# Patient Record
Sex: MALE | Race: WHITE | NOT HISPANIC OR LATINO | Employment: OTHER | URBAN - METROPOLITAN AREA
[De-identification: names, ages, dates, MRNs, and addresses within clinical notes are randomized per-mention and may not be internally consistent; named-entity substitution may affect disease eponyms.]

---

## 2018-09-06 PROBLEM — R03.0 ELEVATED BLOOD PRESSURE READING: Status: ACTIVE | Noted: 2018-09-06

## 2018-09-06 PROBLEM — E78.5 HYPERLIPIDEMIA LDL GOAL <100: Status: ACTIVE | Noted: 2018-09-06

## 2018-09-06 PROBLEM — R35.1 NOCTURIA: Status: ACTIVE | Noted: 2018-09-06

## 2018-09-06 PROBLEM — Z00.00 WELLNESS EXAMINATION: Status: ACTIVE | Noted: 2018-09-06

## 2018-09-06 PROBLEM — R77.9 ELEVATED BLOOD PROTEIN: Status: ACTIVE | Noted: 2018-09-06

## 2018-09-06 PROBLEM — N40.0 BENIGN PROSTATIC HYPERPLASIA: Status: ACTIVE | Noted: 2018-09-06

## 2018-10-05 PROBLEM — I10 HTN, GOAL BELOW 140/90: Status: ACTIVE | Noted: 2018-10-05

## 2018-11-12 PROBLEM — R60.0 BILATERAL LEG EDEMA: Status: ACTIVE | Noted: 2018-11-12

## 2018-11-20 ENCOUNTER — HOSPITAL ENCOUNTER (OUTPATIENT)
Dept: CARDIOLOGY | Facility: HOSPITAL | Age: 78
Discharge: HOME | End: 2018-11-20
Attending: INTERNAL MEDICINE
Payer: MEDICARE

## 2018-11-20 DIAGNOSIS — R60.0 BILATERAL LEG EDEMA: ICD-10-CM

## 2018-11-20 LAB
ASCENDING AORTA: 2.84 CM
AV MEAN GRADIENT: 2.96 MMHG
AV PEAK GRADIENT: 5.02 MMHG
AV VALVE AREA: 4.38 CM2
DOP CALC AO PEAK VEL: 1.12 M/S
DOP CALC AO VTI: 25.37 CM
DOP CALC LVOT AREA: 4.75 CM2
DOP CALC LVOT DIAMETER: 2.46 CM
DOP CALC LVOT STROKE VOLUME: 111.16 CM3
DOP CALCLVOT PEAK VEL VTI: 23.4 CM
E WAVE DECELERATION TIME: 214.44 MSEC
E/A RATIO: 0.9
IVRT: 0.06 MSEC
LA MAJOR: 5.12 CM
LA MINOR: 5.53 CM
LA WIDTH: 3.16 CM
MV PEAK A VEL: 0.71 M/S
MV PEAK E VEL: 0.64 M/S
PISA TR MAX VEL: 1.84 M/S
RA MAJOR: 4.98 CM
RA WIDTH: 3.37 CM
RIGHT VENTRICULAR END-DIASTOLIC DIMENSION: 3.18 CM
RV TISSUE DOPPLER FREE WALL SYSTOLIC VELOCITY 1 (APICAL 4 CHAMBER VIEW): 12.32 M/S
SINUS: 3.91 CM
STJ: 2.74 CM
TR MAX PG: 13.54 MMHG
TRICUSPID ANNULAR PLANE SYSTOLIC EXCURSION: 1.95 CM

## 2018-11-20 PROCEDURE — 93306 TTE W/DOPPLER COMPLETE: CPT

## 2018-11-20 PROCEDURE — 93306 TTE W/DOPPLER COMPLETE: CPT | Mod: 26,,, | Performed by: INTERNAL MEDICINE

## 2018-12-10 PROBLEM — Z00.00 WELLNESS EXAMINATION: Status: RESOLVED | Noted: 2018-09-06 | Resolved: 2018-12-10

## 2019-01-04 ENCOUNTER — LAB VISIT (OUTPATIENT)
Dept: LAB | Facility: HOSPITAL | Age: 79
End: 2019-01-04
Attending: INTERNAL MEDICINE
Payer: MEDICARE

## 2019-01-04 DIAGNOSIS — R10.9 ABDOMINAL PAIN, UNSPECIFIED ABDOMINAL LOCATION: ICD-10-CM

## 2019-01-04 DIAGNOSIS — I89.0 LYMPHEDEMA: ICD-10-CM

## 2019-01-04 DIAGNOSIS — R60.0 BILATERAL LEG EDEMA: ICD-10-CM

## 2019-01-04 DIAGNOSIS — N40.1 ENLARGED PROSTATE WITH URINARY OBSTRUCTION: ICD-10-CM

## 2019-01-04 DIAGNOSIS — N13.8 ENLARGED PROSTATE WITH URINARY OBSTRUCTION: ICD-10-CM

## 2019-01-04 PROBLEM — N50.89 SCROTAL SWELLING: Status: ACTIVE | Noted: 2019-01-04

## 2019-01-04 LAB
ALBUMIN SERPL BCP-MCNC: 3.9 G/DL
ALP SERPL-CCNC: 80 U/L
ALT SERPL W/O P-5'-P-CCNC: 10 U/L
ANION GAP SERPL CALC-SCNC: 11 MMOL/L
AST SERPL-CCNC: 16 U/L
BASOPHILS # BLD AUTO: 0.02 K/UL
BASOPHILS NFR BLD: 0.3 %
BILIRUB SERPL-MCNC: 0.9 MG/DL
BUN SERPL-MCNC: 20 MG/DL
CALCIUM SERPL-MCNC: 9.6 MG/DL
CHLORIDE SERPL-SCNC: 100 MMOL/L
CO2 SERPL-SCNC: 26 MMOL/L
CREAT SERPL-MCNC: 2.3 MG/DL
CREAT SERPL-MCNC: 2.3 MG/DL
DIFFERENTIAL METHOD: ABNORMAL
EOSINOPHIL # BLD AUTO: 0.1 K/UL
EOSINOPHIL NFR BLD: 1.8 %
ERYTHROCYTE [DISTWIDTH] IN BLOOD BY AUTOMATED COUNT: 14.5 %
EST. GFR  (AFRICAN AMERICAN): 30 ML/MIN/1.73 M^2
EST. GFR  (AFRICAN AMERICAN): 30 ML/MIN/1.73 M^2
EST. GFR  (NON AFRICAN AMERICAN): 26 ML/MIN/1.73 M^2
EST. GFR  (NON AFRICAN AMERICAN): 26 ML/MIN/1.73 M^2
GLUCOSE SERPL-MCNC: 121 MG/DL
HCT VFR BLD AUTO: 40.6 %
HGB BLD-MCNC: 13.7 G/DL
LDH SERPL L TO P-CCNC: 268 U/L
LYMPHOCYTES # BLD AUTO: 0.8 K/UL
LYMPHOCYTES NFR BLD: 12.3 %
MCH RBC QN AUTO: 29.7 PG
MCHC RBC AUTO-ENTMCNC: 33.7 G/DL
MCV RBC AUTO: 88 FL
MONOCYTES # BLD AUTO: 0.5 K/UL
MONOCYTES NFR BLD: 7.1 %
NEUTROPHILS # BLD AUTO: 5.1 K/UL
NEUTROPHILS NFR BLD: 78.5 %
PLATELET # BLD AUTO: 283 K/UL
PMV BLD AUTO: 9.4 FL
POTASSIUM SERPL-SCNC: 4.3 MMOL/L
PROT SERPL-MCNC: 7.6 G/DL
RBC # BLD AUTO: 4.61 M/UL
SODIUM SERPL-SCNC: 137 MMOL/L
URATE SERPL-MCNC: 8.6 MG/DL
WBC # BLD AUTO: 6.51 K/UL

## 2019-01-04 PROCEDURE — 80053 COMPREHEN METABOLIC PANEL: CPT

## 2019-01-04 PROCEDURE — 84154 ASSAY OF PSA FREE: CPT

## 2019-01-04 PROCEDURE — 36415 COLL VENOUS BLD VENIPUNCTURE: CPT

## 2019-01-04 PROCEDURE — 84550 ASSAY OF BLOOD/URIC ACID: CPT

## 2019-01-04 PROCEDURE — 85025 COMPLETE CBC W/AUTO DIFF WBC: CPT

## 2019-01-04 PROCEDURE — 83615 LACTATE (LD) (LDH) ENZYME: CPT

## 2019-01-07 LAB
PROSTATE SPECIFIC ANTIGEN, TOTAL: 97.2 NG/ML
PSA FREE MFR SERPL: ABNORMAL %
PSA FREE SERPL-MCNC: >20 NG/ML

## 2019-01-10 ENCOUNTER — HOSPITAL ENCOUNTER (OUTPATIENT)
Dept: RADIOLOGY | Facility: HOSPITAL | Age: 79
Discharge: HOME | DRG: 660 | End: 2019-01-10
Attending: INTERNAL MEDICINE
Payer: MEDICARE

## 2019-01-10 ENCOUNTER — HOSPITAL ENCOUNTER (INPATIENT)
Facility: HOSPITAL | Age: 79
LOS: 5 days | Discharge: HOME | DRG: 660 | End: 2019-01-15
Attending: EMERGENCY MEDICINE | Admitting: INTERNAL MEDICINE
Payer: MEDICARE

## 2019-01-10 DIAGNOSIS — R10.9 ABDOMINAL PAIN, UNSPECIFIED ABDOMINAL LOCATION: ICD-10-CM

## 2019-01-10 DIAGNOSIS — R35.1 NOCTURIA: ICD-10-CM

## 2019-01-10 DIAGNOSIS — R33.9 URINARY RETENTION: ICD-10-CM

## 2019-01-10 DIAGNOSIS — N40.1 ENLARGED PROSTATE WITH URINARY OBSTRUCTION: ICD-10-CM

## 2019-01-10 DIAGNOSIS — N50.89 SCROTAL SWELLING: ICD-10-CM

## 2019-01-10 DIAGNOSIS — R59.0 RETROPERITONEAL LYMPHADENOPATHY: ICD-10-CM

## 2019-01-10 DIAGNOSIS — N17.9 AKI (ACUTE KIDNEY INJURY): Primary | ICD-10-CM

## 2019-01-10 DIAGNOSIS — N13.8 ENLARGED PROSTATE WITH URINARY OBSTRUCTION: ICD-10-CM

## 2019-01-10 DIAGNOSIS — N13.30 BILATERAL HYDRONEPHROSIS: ICD-10-CM

## 2019-01-10 DIAGNOSIS — R60.0 LOWER EXTREMITY EDEMA: ICD-10-CM

## 2019-01-10 PROBLEM — R97.20 ELEVATED PSA: Status: ACTIVE | Noted: 2019-01-10

## 2019-01-10 LAB
ALBUMIN SERPL BCP-MCNC: 3.6 G/DL
ALP SERPL-CCNC: 83 U/L
ALT SERPL W/O P-5'-P-CCNC: 8 U/L
ANION GAP SERPL CALC-SCNC: 12 MMOL/L
AST SERPL-CCNC: 17 U/L
BACTERIA #/AREA URNS HPF: NORMAL /HPF
BASOPHILS # BLD AUTO: 0.01 K/UL
BASOPHILS NFR BLD: 0.2 %
BILIRUB SERPL-MCNC: 0.6 MG/DL
BILIRUB UR QL STRIP: NEGATIVE
BNP SERPL-MCNC: 38 PG/ML
BUN SERPL-MCNC: 17 MG/DL
CALCIUM SERPL-MCNC: 9.7 MG/DL
CHLORIDE SERPL-SCNC: 102 MMOL/L
CLARITY UR: CLEAR
CO2 SERPL-SCNC: 22 MMOL/L
COLOR UR: YELLOW
COMPLEXED PSA SERPL-MCNC: 95.8 NG/ML
CREAT SERPL-MCNC: 2.3 MG/DL
DIFFERENTIAL METHOD: ABNORMAL
EOSINOPHIL # BLD AUTO: 0.1 K/UL
EOSINOPHIL NFR BLD: 0.9 %
ERYTHROCYTE [DISTWIDTH] IN BLOOD BY AUTOMATED COUNT: 14.5 %
EST. GFR  (AFRICAN AMERICAN): 30 ML/MIN/1.73 M^2
EST. GFR  (NON AFRICAN AMERICAN): 26 ML/MIN/1.73 M^2
GLUCOSE SERPL-MCNC: 84 MG/DL
GLUCOSE UR QL STRIP: NEGATIVE
HCT VFR BLD AUTO: 41.1 %
HGB BLD-MCNC: 14 G/DL
HGB UR QL STRIP: ABNORMAL
INR PPP: 1
KETONES UR QL STRIP: NEGATIVE
LEUKOCYTE ESTERASE UR QL STRIP: ABNORMAL
LYMPHOCYTES # BLD AUTO: 0.7 K/UL
LYMPHOCYTES NFR BLD: 11.2 %
MCH RBC QN AUTO: 29.9 PG
MCHC RBC AUTO-ENTMCNC: 34.1 G/DL
MCV RBC AUTO: 88 FL
MICROSCOPIC COMMENT: NORMAL
MONOCYTES # BLD AUTO: 0.5 K/UL
MONOCYTES NFR BLD: 8.3 %
NEUTROPHILS # BLD AUTO: 5.1 K/UL
NEUTROPHILS NFR BLD: 79.4 %
NITRITE UR QL STRIP: NEGATIVE
PH UR STRIP: 5 [PH] (ref 5–8)
PLATELET # BLD AUTO: 277 K/UL
PMV BLD AUTO: 9.6 FL
POTASSIUM SERPL-SCNC: 4.5 MMOL/L
PROT SERPL-MCNC: 7.5 G/DL
PROT UR QL STRIP: NEGATIVE
PROTHROMBIN TIME: 10 SEC
RBC # BLD AUTO: 4.69 M/UL
RBC #/AREA URNS HPF: 3 /HPF (ref 0–4)
SODIUM SERPL-SCNC: 136 MMOL/L
SP GR UR STRIP: 1.01 (ref 1–1.03)
SQUAMOUS #/AREA URNS HPF: 1 /HPF
URN SPEC COLLECT METH UR: ABNORMAL
UROBILINOGEN UR STRIP-ACNC: NEGATIVE EU/DL
WBC # BLD AUTO: 6.41 K/UL
WBC #/AREA URNS HPF: 4 /HPF (ref 0–5)

## 2019-01-10 PROCEDURE — 99223 1ST HOSP IP/OBS HIGH 75: CPT | Mod: ,,, | Performed by: UROLOGY

## 2019-01-10 PROCEDURE — 96360 HYDRATION IV INFUSION INIT: CPT

## 2019-01-10 PROCEDURE — 99223 PR INITIAL HOSPITAL CARE,LEVL III: ICD-10-PCS | Mod: ,,, | Performed by: UROLOGY

## 2019-01-10 PROCEDURE — 85610 PROTHROMBIN TIME: CPT

## 2019-01-10 PROCEDURE — 80053 COMPREHEN METABOLIC PANEL: CPT

## 2019-01-10 PROCEDURE — 74176 CT ABD & PELVIS W/O CONTRAST: CPT | Mod: 26,,, | Performed by: RADIOLOGY

## 2019-01-10 PROCEDURE — 84153 ASSAY OF PSA TOTAL: CPT

## 2019-01-10 PROCEDURE — 74176 CT ABD & PELVIS W/O CONTRAST: CPT | Mod: TC

## 2019-01-10 PROCEDURE — 51702 INSERT TEMP BLADDER CATH: CPT

## 2019-01-10 PROCEDURE — 25000003 PHARM REV CODE 250: Performed by: EMERGENCY MEDICINE

## 2019-01-10 PROCEDURE — 25500020 PHARM REV CODE 255: Performed by: INTERNAL MEDICINE

## 2019-01-10 PROCEDURE — 11000001 HC ACUTE MED/SURG PRIVATE ROOM

## 2019-01-10 PROCEDURE — 85025 COMPLETE CBC W/AUTO DIFF WBC: CPT

## 2019-01-10 PROCEDURE — 74176 CT ABDOMEN PELVIS WITHOUT CONTRAST: ICD-10-PCS | Mod: 26,,, | Performed by: RADIOLOGY

## 2019-01-10 PROCEDURE — 96361 HYDRATE IV INFUSION ADD-ON: CPT

## 2019-01-10 PROCEDURE — 99285 EMERGENCY DEPT VISIT HI MDM: CPT | Mod: 25

## 2019-01-10 PROCEDURE — 83880 ASSAY OF NATRIURETIC PEPTIDE: CPT

## 2019-01-10 PROCEDURE — 81000 URINALYSIS NONAUTO W/SCOPE: CPT

## 2019-01-10 PROCEDURE — 63600175 PHARM REV CODE 636 W HCPCS: Performed by: EMERGENCY MEDICINE

## 2019-01-10 RX ORDER — ACETAMINOPHEN 325 MG/1
650 TABLET ORAL EVERY 8 HOURS PRN
Status: DISCONTINUED | OUTPATIENT
Start: 2019-01-10 | End: 2019-01-15 | Stop reason: HOSPADM

## 2019-01-10 RX ORDER — HEPARIN SODIUM 5000 [USP'U]/ML
5000 INJECTION, SOLUTION INTRAVENOUS; SUBCUTANEOUS EVERY 8 HOURS
Status: COMPLETED | OUTPATIENT
Start: 2019-01-10 | End: 2019-01-13

## 2019-01-10 RX ORDER — TAMSULOSIN HYDROCHLORIDE 0.4 MG/1
0.4 CAPSULE ORAL DAILY
Status: DISCONTINUED | OUTPATIENT
Start: 2019-01-11 | End: 2019-01-15 | Stop reason: HOSPADM

## 2019-01-10 RX ORDER — ONDANSETRON 8 MG/1
8 TABLET, ORALLY DISINTEGRATING ORAL EVERY 8 HOURS PRN
Status: DISCONTINUED | OUTPATIENT
Start: 2019-01-10 | End: 2019-01-15 | Stop reason: HOSPADM

## 2019-01-10 RX ORDER — SODIUM CHLORIDE 9 MG/ML
1000 INJECTION, SOLUTION INTRAVENOUS CONTINUOUS
Status: ACTIVE | OUTPATIENT
Start: 2019-01-10 | End: 2019-01-11

## 2019-01-10 RX ORDER — SODIUM CHLORIDE 0.9 % (FLUSH) 0.9 %
5 SYRINGE (ML) INJECTION
Status: DISCONTINUED | OUTPATIENT
Start: 2019-01-10 | End: 2019-01-15 | Stop reason: HOSPADM

## 2019-01-10 RX ADMIN — HEPARIN SODIUM 5000 UNITS: 5000 INJECTION, SOLUTION INTRAVENOUS; SUBCUTANEOUS at 09:01

## 2019-01-10 RX ADMIN — IOHEXOL 15 ML: 300 INJECTION, SOLUTION INTRAVENOUS at 08:01

## 2019-01-10 RX ADMIN — SODIUM CHLORIDE 1000 ML: 0.9 INJECTION, SOLUTION INTRAVENOUS at 02:01

## 2019-01-10 NOTE — ASSESSMENT & PLAN NOTE
- Concern for obstruction from prostate and/or LAD   - Place arias (done in ER)   - Check JETT in 2 days to evaluate for residual hydronephrosis   - Discussed possible b/l ureteral stent placement if hydronephrosis does not resolve/worsens

## 2019-01-10 NOTE — CONSULTS
[facility]  Urology  Consult Note    Patient Name: Obed Sood  MRN: [id number]  Admission Date: 1/10/2019  Hospital Length of Stay: 0   Code Status: No Order   Attending Provider: Rina Comer MD   Consulting Provider: Myriam Spivey MD  Primary Care Physician: Enrique Barker MD  Principal Problem:<principal problem not specified>    Inpatient consult to Urology  Consult performed by: Myriam Spivey MD  Consult ordered by: Rina Comer MD          Subjective:     HPI:  79yo M with recent ARF and LE edema. PSA last week was 97.2. He has no known prostate cancer issues though does report in  he was recommended for PBx but declined at that time. He notes mild urinary issues. Denies hematuria.     PVR today 400cc. Escalante catheter placed in ER with return of 400cc yellow urine.     CT scan today shows nodular, enlarged prostate with extensive LAD. Mild bilateral hydroureteronephrosis with some tortuosity of ureters. Thickened bladder with moderate distention.    Past Medical History:   Diagnosis Date    MIAN (acute kidney injury) 09/10/2018    BPH (benign prostatic hyperplasia)     Edema 2018       Past Surgical History:   Procedure Laterality Date    HERNIA REPAIR N/A     umbilical    MULTIPLE TOOTH EXTRACTIONS      TONSILLECTOMY Bilateral 1969       Review of patient's allergies indicates:  No Known Allergies    Family History     Problem Relation (Age of Onset)    No Known Problems Mother, Father, Sister, Brother          Tobacco Use    Smoking status: Former Smoker     Packs/day: 0.50     Start date: 1953     Last attempt to quit: 1978     Years since quittin.3    Smokeless tobacco: Former User   Substance and Sexual Activity    Alcohol use: No     Frequency: Never    Drug use: No    Sexual activity: Yes     Partners: Female     Birth control/protection: None       Review of Systems   Constitutional: Negative for chills and fever.    HENT: Negative for hearing loss, sore throat and trouble swallowing.    Eyes: Negative.    Respiratory: Negative for cough and shortness of breath.    Cardiovascular: Negative for chest pain.   Gastrointestinal: Negative for abdominal distention, abdominal pain, constipation, diarrhea, nausea and vomiting.   Genitourinary: Positive for difficulty urinating. Negative for frequency and hematuria.   Musculoskeletal: Negative for arthralgias and neck pain.        Edema   Skin: Negative for color change, pallor and rash.   Neurological: Negative for dizziness and seizures.   Hematological: Negative for adenopathy.   Psychiatric/Behavioral: Negative for confusion.   All other systems reviewed and are negative.      Objective:     Temp:  [98.2 °F (36.8 °C)-98.6 °F (37 °C)] 98.2 °F (36.8 °C)  Pulse:  [63-85] 71  Resp:  [13-20] 20  SpO2:  [96 %-98 %] 98 %  BP: (112-172)/(66-75) 154/72     Body mass index is 26.43 kg/m².    Date 01/10/19 0700 - 01/11/19 0659   Shift 6462-8646 5398-3174 6943-3871 24 Hour Total   INTAKE   P.O. 8   8   Shift Total 8   8   OUTPUT   Urine 505   505   Shift Total 505   505   Weight (kg)         Bladder Scan Volume (mL): 402 mL (01/10/19 1226)  Post Void Cath Residual (mL): 380 mL (01/10/19 1125)    Drains     Drain                 Urethral Catheter 01/10/19 1255 16 Fr. less than 1 day                Physical Exam   Vitals reviewed.  Constitutional: He is oriented to person, place, and time. He appears well-developed and well-nourished. No distress.   HENT:   Head: Normocephalic and atraumatic.   Eyes: Right eye exhibits no discharge. Left eye exhibits no discharge. No scleral icterus.   Neck: Normal range of motion. Neck supple.   Cardiovascular: Normal rate and regular rhythm.    Pulmonary/Chest: Effort normal and breath sounds normal. No respiratory distress.   Abdominal: Soft. Bowel sounds are normal. He exhibits no distension. There is no tenderness. There is no rebound and no guarding.    Genitourinary:   Genitourinary Comments: Arias - clear yellow urine  MARY - rigid prostate, no distinct nodularity. Approx 60g.    Musculoskeletal: Normal range of motion.   Neurological: He is alert and oriented to person, place, and time.   Skin: Skin is warm and dry. He is not diaphoretic. No erythema.         Significant Labs:    BMP:  Recent Labs   Lab 01/04/19  1447 01/09/19  1742 01/10/19  1110    139 136   K 4.3 4.4 4.5    104 102   CO2 26 27 22*   BUN 20 16 17   CREATININE 2.3*  2.3* 2.2* 2.3*   CALCIUM 9.6 8.9 9.7       CBC:  Recent Labs   Lab 01/04/19  1447 01/10/19  1110   WBC 6.51 6.41   HGB 13.7* 14.0   HCT 40.6 41.1    277       Blood Culture: No results for input(s): LABBLOO in the last 168 hours.  Urine Culture: No results for input(s): LABURIN in the last 168 hours.  Urine Studies:   Recent Labs   Lab 01/10/19  1125   COLORU Yellow   APPEARANCEUA Clear   PHUR 5.0   SPECGRAV 1.010   PROTEINUA Negative   GLUCUA Negative   KETONESU Negative   BILIRUBINUA Negative   OCCULTUA 2+*   NITRITE Negative   UROBILINOGEN Negative   LEUKOCYTESUR Trace*   RBCUA 3   WBCUA 4   BACTERIA Rare   SQUAMEPITHEL 1       Significant Imaging:  All pertinent imaging results/findings from the past 24 hours have been reviewed.          Assessment and Plan:     Bilateral hydronephrosis     - Concern for obstruction from prostate and/or LAD   - Place arias (done in ER)   - Check JETT in 2 days to evaluate for residual hydronephrosis   - Discussed possible b/l ureteral stent placement if hydronephrosis does not resolve/worsens      Elevated PSA     - Highly concerning for prostate cancer   - Will need prostate biopsy (or lymph node biopsy)     Enlarged prostate with urinary obstruction     - Arias catheter   - Flomax         VTE Risk Mitigation (From admission, onward)    None          Thank you for your consult. I will follow-up with patient. Please contact us if you have any additional  questions.    Myriam Spivey MD  Urology  [facility]

## 2019-01-10 NOTE — SUBJECTIVE & OBJECTIVE
Past Medical History:   Diagnosis Date    MIAN (acute kidney injury) 09/10/2018    BPH (benign prostatic hyperplasia)     Edema 2018       Past Surgical History:   Procedure Laterality Date    HERNIA REPAIR N/A     umbilical    MULTIPLE TOOTH EXTRACTIONS      TONSILLECTOMY Bilateral 1969       Review of patient's allergies indicates:  No Known Allergies    Family History     Problem Relation (Age of Onset)    No Known Problems Mother, Father, Sister, Brother          Tobacco Use    Smoking status: Former Smoker     Packs/day: 0.50     Start date: 1953     Last attempt to quit: 1978     Years since quittin.3    Smokeless tobacco: Former User   Substance and Sexual Activity    Alcohol use: No     Frequency: Never    Drug use: No    Sexual activity: Yes     Partners: Female     Birth control/protection: None       Review of Systems   Constitutional: Negative for chills and fever.   HENT: Negative for hearing loss, sore throat and trouble swallowing.    Eyes: Negative.    Respiratory: Negative for cough and shortness of breath.    Cardiovascular: Negative for chest pain.   Gastrointestinal: Negative for abdominal distention, abdominal pain, constipation, diarrhea, nausea and vomiting.   Genitourinary: Positive for difficulty urinating. Negative for frequency and hematuria.   Musculoskeletal: Negative for arthralgias and neck pain.        Edema   Skin: Negative for color change, pallor and rash.   Neurological: Negative for dizziness and seizures.   Hematological: Negative for adenopathy.   Psychiatric/Behavioral: Negative for confusion.   All other systems reviewed and are negative.      Objective:     Temp:  [98.2 °F (36.8 °C)-98.6 °F (37 °C)] 98.2 °F (36.8 °C)  Pulse:  [63-85] 71  Resp:  [13-20] 20  SpO2:  [96 %-98 %] 98 %  BP: (112-172)/(66-75) 154/72     Body mass index is 26.43 kg/m².    Date 01/10/19 0700 - 19 0659   Shift 8478-5343 0255-1483 8118-5878 24 Hour Total   INTAKE    P.O. 8   8   Shift Total 8   8   OUTPUT   Urine 505   505   Shift Total 505   505   Weight (kg)         Bladder Scan Volume (mL): 402 mL (01/10/19 1226)  Post Void Cath Residual (mL): 380 mL (01/10/19 1125)    Drains     Drain                 Urethral Catheter 01/10/19 1255 16 Fr. less than 1 day                Physical Exam   Vitals reviewed.  Constitutional: He is oriented to person, place, and time. He appears well-developed and well-nourished. No distress.   HENT:   Head: Normocephalic and atraumatic.   Eyes: Right eye exhibits no discharge. Left eye exhibits no discharge. No scleral icterus.   Neck: Normal range of motion. Neck supple.   Cardiovascular: Normal rate and regular rhythm.    Pulmonary/Chest: Effort normal and breath sounds normal. No respiratory distress.   Abdominal: Soft. Bowel sounds are normal. He exhibits no distension. There is no tenderness. There is no rebound and no guarding.   Genitourinary:   Genitourinary Comments: Escalante - clear yellow urine  MARY - rigid prostate, no distinct nodularity. Approx 60g.    Musculoskeletal: Normal range of motion.   Neurological: He is alert and oriented to person, place, and time.   Skin: Skin is warm and dry. He is not diaphoretic. No erythema.         Significant Labs:    BMP:  Recent Labs   Lab 01/04/19  1447 01/09/19  1742 01/10/19  1110    139 136   K 4.3 4.4 4.5    104 102   CO2 26 27 22*   BUN 20 16 17   CREATININE 2.3*  2.3* 2.2* 2.3*   CALCIUM 9.6 8.9 9.7       CBC:  Recent Labs   Lab 01/04/19  1447 01/10/19  1110   WBC 6.51 6.41   HGB 13.7* 14.0   HCT 40.6 41.1    277       Blood Culture: No results for input(s): LABBLOO in the last 168 hours.  Urine Culture: No results for input(s): LABURIN in the last 168 hours.  Urine Studies:   Recent Labs   Lab 01/10/19  1125   COLORU Yellow   APPEARANCEUA Clear   PHUR 5.0   SPECGRAV 1.010   PROTEINUA Negative   GLUCUA Negative   KETONESU Negative   BILIRUBINUA Negative   OCCULTUA 2+*    NITRITE Negative   UROBILINOGEN Negative   LEUKOCYTESUR Trace*   RBCUA 3   WBCUA 4   BACTERIA Rare   SQUAMEPITHEL 1       Significant Imaging:  All pertinent imaging results/findings from the past 24 hours have been reviewed.

## 2019-01-10 NOTE — ED PROVIDER NOTES
"Encounter Date: 1/10/2019    SCRIBE #1 NOTE: I, Anai Feliciano, am scribing for, and in the presence of,  Rina Comer MD. I have scribed the following portions of the note - Other sections scribed: HPI, ROS.       History     Chief Complaint   Patient presents with    Urinary Obstruction     Send by Dr. Owen.  "I have urine backing up in my kidney."  PMx prostate cancer.     CC: Urinary Obstruction    HPI: This is a 79 y/o male with BPH who presents to the ED for emergent evaluation of urinary obstruction. Pt notes that he was sent by Dr. Barker for admission. Pt reports that he has been getting IV fluids for the past 3 days for his bilateral lower extremity edema. Pt reports that the edema occurred after getting back from a road trip to [place] with lots of walking. Pt was told that he had "urine backing up his kidneys." Pt notes that after Dr. Barker saw pt's CT scan, he told him to come to ED to be admitted. Pt reports that it is difficult to lift his legs but denies any other symptoms. Pt notes that he only takes Flomax, Dyazide, and Saw palmetto daily. Denies known allergies.     On discussion with Dr. Barker, he had a CT scan performed due to concern that he may have retroperitoneal adenopathy.  CT was performed showing retroperitoneal adenopathy, nodular prostate, and urinary obstruction with bilateral hydronephrosis.  He was sent to the emergency department for emergent urology evaluation and admission for a KI and urinary obstruction.        The history is provided by the patient. No  was used.     Review of patient's allergies indicates:  No Known Allergies  Past Medical History:   Diagnosis Date    MIAN (acute kidney injury) 09/10/2018    BPH (benign prostatic hyperplasia)     Edema 11/02/2018     Past Surgical History:   Procedure Laterality Date    HERNIA REPAIR N/A 1999    umbilical    MULTIPLE TOOTH EXTRACTIONS      TONSILLECTOMY Bilateral 1969     Family " History   Problem Relation Age of Onset    No Known Problems Mother         93    No Known Problems Father         88: DM    No Known Problems Sister         1 sister     No Known Problems Brother         4 brothers     Social History     Tobacco Use    Smoking status: Former Smoker     Packs/day: 0.50     Start date: 1953     Last attempt to quit: 1978     Years since quittin.3    Smokeless tobacco: Former User   Substance Use Topics    Alcohol use: No     Frequency: Never    Drug use: No     Review of Systems   Constitutional: Negative for chills and fever.   HENT: Negative for congestion, ear pain, rhinorrhea and sore throat.    Eyes: Negative for pain and visual disturbance.   Respiratory: Negative for cough and shortness of breath.    Cardiovascular: Positive for leg swelling (bilateral ). Negative for chest pain.   Gastrointestinal: Negative for abdominal pain, diarrhea, nausea and vomiting.   Genitourinary: Negative for dysuria.   Musculoskeletal: Negative for back pain and neck pain.   Skin: Negative for rash.   Neurological: Negative for headaches.       Physical Exam     Initial Vitals [01/10/19 0956]   BP Pulse Resp Temp SpO2   (!) 172/75 85 18 98.2 °F (36.8 °C) 97 %      MAP       --         Physical Exam    Constitutional: He appears well-developed and well-nourished. He is not diaphoretic. No distress.   HENT:   Mouth/Throat: Oropharynx is clear and moist.   Eyes: Pupils are equal, round, and reactive to light.   Neck: Neck supple.   Cardiovascular: Normal rate and regular rhythm.   Pulses:       Dorsalis pedis pulses are 2+ on the right side, and 2+ on the left side.   Pulmonary/Chest: Breath sounds normal. No respiratory distress.   Abdominal: Soft. Bowel sounds are normal.   Musculoskeletal: He exhibits edema (Pitting edema that extends all the way up to the upper thighs bilaterally).   Right pitting edema slightly greater than left side.   Neurological: He is alert.   Skin:  Skin is warm and dry.   Psychiatric: He has a normal mood and affect.         ED Course   Procedures  Labs Reviewed   CBC W/ AUTO DIFFERENTIAL - Abnormal; Notable for the following components:       Result Value    Lymph # 0.7 (*)     Gran% 79.4 (*)     Lymph% 11.2 (*)     All other components within normal limits   COMPREHENSIVE METABOLIC PANEL - Abnormal; Notable for the following components:    CO2 22 (*)     Creatinine 2.3 (*)     ALT 8 (*)     eGFR if  30 (*)     eGFR if non  26 (*)     All other components within normal limits   URINALYSIS, REFLEX TO URINE CULTURE - Abnormal; Notable for the following components:    Occult Blood UA 2+ (*)     Leukocytes, UA Trace (*)     All other components within normal limits    Narrative:     Preferred Collection Type->Urine, Clean Catch   B-TYPE NATRIURETIC PEPTIDE   PROTIME-INR   URINALYSIS MICROSCOPIC    Narrative:     Preferred Collection Type->Urine, Clean Catch          Imaging Results    None                     Scribe Attestation:   Scribe #1: I performed the above scribed service and the documentation accurately describes the services I performed. I attest to the accuracy of the note.    Attending Attestation:           Physician Attestation for Scribe:  Physician Attestation Statement for Scribe #1: I, Rina Comer MD, reviewed documentation, as scribed by Anai Feliciano in my presence, and it is both accurate and complete.                 ED Course as of Sean 10 2000   Thu Sean 10, 2019   1128 Discussed with Dr. Barker- patient to be admitted with Urology consult.  I discussed with Dr. LETI Duarte who recommends getting a screening PSA.  She advised placing a Escalante if needed if there is urinary retention, and checking urinalysis for infection.  Orders pending at this time.  [LH]   1234 Urinalysis with trace leukocytes, nitrite negative.  Culture sent.  I reviewed lab results and care plan with patient.  Will place Escalante, admit  orders placed  [LH]      ED Course User Index  [LH] Rina Comer MD     Clinical Impression:   The primary encounter diagnosis was MIAN (acute kidney injury). Diagnoses of Urinary retention, Retroperitoneal lymphadenopathy, and Lower extremity edema were also pertinent to this visit.                             Rina Comer MD  01/10/19 2000

## 2019-01-10 NOTE — HPI
77yo M with recent ARF and LE edema. PSA last week was 97.2. He has no known prostate cancer issues though does report in 1990s he was recommended for PBx but declined at that time. He notes mild urinary issues. Denies hematuria.     PVR today 400cc. Escalante catheter placed in ER with return of 400cc yellow urine.     CT scan today shows nodular, enlarged prostate with extensive LAD. Mild bilateral hydroureteronephrosis with some tortuosity of ureters. Thickened bladder with moderate distention.

## 2019-01-10 NOTE — ED TRIAGE NOTES
Pt comes in after CT done today. Pt reports Dr. Owen called him and told him to come back to hospital for blockages in his kidney. Pt states he has enlarged prostate and has also been seeing Dr. Owen for swelling to lower legs. Pt reports use of flomax for a few weeks. Pt denies burning or pain when urinating. Pt has bilateral leg edema +2 x 10 weeks.

## 2019-01-10 NOTE — ED NOTES
Pt has BM and urinates. Pt states he feels like he urinated a lot. Bladder scan done and reveals 402 cc. Dr. Comer made aware.

## 2019-01-10 NOTE — ED NOTES
Pt updated on plan of care and waiting for room assignment. Call light within reach. Will continue to monitor.

## 2019-01-11 LAB
ANION GAP SERPL CALC-SCNC: 10 MMOL/L
BASOPHILS # BLD AUTO: 0.01 K/UL
BASOPHILS NFR BLD: 0.2 %
BUN SERPL-MCNC: 18 MG/DL
CALCIUM SERPL-MCNC: 9 MG/DL
CHLORIDE SERPL-SCNC: 104 MMOL/L
CO2 SERPL-SCNC: 23 MMOL/L
CREAT SERPL-MCNC: 2.2 MG/DL
DIFFERENTIAL METHOD: ABNORMAL
EOSINOPHIL # BLD AUTO: 0.1 K/UL
EOSINOPHIL NFR BLD: 2.2 %
ERYTHROCYTE [DISTWIDTH] IN BLOOD BY AUTOMATED COUNT: 14.7 %
EST. GFR  (AFRICAN AMERICAN): 32 ML/MIN/1.73 M^2
EST. GFR  (NON AFRICAN AMERICAN): 28 ML/MIN/1.73 M^2
GLUCOSE SERPL-MCNC: 93 MG/DL
HCT VFR BLD AUTO: 38.3 %
HGB BLD-MCNC: 12.6 G/DL
LYMPHOCYTES # BLD AUTO: 0.7 K/UL
LYMPHOCYTES NFR BLD: 11.4 %
MCH RBC QN AUTO: 29.4 PG
MCHC RBC AUTO-ENTMCNC: 32.9 G/DL
MCV RBC AUTO: 89 FL
MONOCYTES # BLD AUTO: 0.7 K/UL
MONOCYTES NFR BLD: 10.6 %
NEUTROPHILS # BLD AUTO: 4.8 K/UL
NEUTROPHILS NFR BLD: 75.6 %
PLATELET # BLD AUTO: 298 K/UL
PMV BLD AUTO: 9.8 FL
POTASSIUM SERPL-SCNC: 4.1 MMOL/L
RBC # BLD AUTO: 4.29 M/UL
SODIUM SERPL-SCNC: 137 MMOL/L
WBC # BLD AUTO: 6.33 K/UL

## 2019-01-11 PROCEDURE — 99232 PR SUBSEQUENT HOSPITAL CARE,LEVL II: ICD-10-PCS | Mod: ,,, | Performed by: UROLOGY

## 2019-01-11 PROCEDURE — 85025 COMPLETE CBC W/AUTO DIFF WBC: CPT

## 2019-01-11 PROCEDURE — 11000001 HC ACUTE MED/SURG PRIVATE ROOM

## 2019-01-11 PROCEDURE — 99232 SBSQ HOSP IP/OBS MODERATE 35: CPT | Mod: ,,, | Performed by: UROLOGY

## 2019-01-11 PROCEDURE — 63600175 PHARM REV CODE 636 W HCPCS: Performed by: EMERGENCY MEDICINE

## 2019-01-11 PROCEDURE — 80048 BASIC METABOLIC PNL TOTAL CA: CPT

## 2019-01-11 PROCEDURE — 36415 COLL VENOUS BLD VENIPUNCTURE: CPT

## 2019-01-11 PROCEDURE — 25000003 PHARM REV CODE 250: Performed by: EMERGENCY MEDICINE

## 2019-01-11 RX ADMIN — HEPARIN SODIUM 5000 UNITS: 5000 INJECTION, SOLUTION INTRAVENOUS; SUBCUTANEOUS at 09:01

## 2019-01-11 RX ADMIN — HEPARIN SODIUM 5000 UNITS: 5000 INJECTION, SOLUTION INTRAVENOUS; SUBCUTANEOUS at 05:01

## 2019-01-11 RX ADMIN — HEPARIN SODIUM 5000 UNITS: 5000 INJECTION, SOLUTION INTRAVENOUS; SUBCUTANEOUS at 03:01

## 2019-01-11 RX ADMIN — TAMSULOSIN HYDROCHLORIDE 0.4 MG: 0.4 CAPSULE ORAL at 12:01

## 2019-01-11 RX ADMIN — ACETAMINOPHEN 650 MG: 325 TABLET ORAL at 03:01

## 2019-01-11 NOTE — NURSING
Pt arrived to unit aaox3 via stretcher. Ns running @ 75ml. Denies pain. Escalante draining clear yellow urine. Tele box # 6191 normal sinus rhythm. Call light within reach, bed in lowest position.

## 2019-01-11 NOTE — ASSESSMENT & PLAN NOTE
- Concern for obstruction from prostate and/or LAD   - Place arias (done in ER)   - Check JETT in AM

## 2019-01-11 NOTE — PLAN OF CARE
"TN met with patient at bedside to complete discharge needs assessment. TN explained duties of case management to patient.  TN reviewed   "[facility] Packet", "Discharge Planning Begins on Admission" and discussed "Help at Home". Patient stated he lives with his wife Iman whom assists him at home as needed. Patient plans to discharge home when ready for discharge. TN also discussed his responsibilities to manage his health at home. Patient was informed to leave folder at bedside during hospital stay. Contact information added to white board.    Patient Preferred Pharmacy:   [id number] - [place] - [address] AT [facility] [address]  [address]  [place] [address]  Phone: [phone] Fax: [phone]      Appointment Time Preference: mornings       01/11/19 1306   Discharge Assessment   Assessment Type Discharge Planning Assessment   Assessment information obtained from? Patient   Prior to hospitilization cognitive status: Alert/Oriented   Prior to hospitalization functional status: Independent   Current cognitive status: Alert/Oriented   Current Functional Status: Independent   Lives With spouse   Able to Return to Prior Arrangements yes   Is patient able to care for self after discharge? Yes   Who are your caregiver(s) and their phone number(s)? Iman- spouse @ [phone]   Patient's perception of discharge disposition home or selfcare   Readmission Within the Last 30 Days no previous admission in last 30 days   Patient currently being followed by outpatient case management? No   Patient currently receives any other outside agency services? No   Equipment Currently Used at Home none   Do you have any problems affording any of your prescribed medications? No   Is the patient taking medications as prescribed? yes   Does the patient have transportation home? Yes   Transportation Anticipated family or friend will provide   Does the patient receive services at the Coumadin " Clinic? No   Discharge Plan A Home;Home with family   Discharge Plan B Home;Home with family   DME Needed Upon Discharge  none   Patient/Family in Agreement with Plan yes

## 2019-01-11 NOTE — PROGRESS NOTES
[facility]  Urology  Progress Note    Patient Name: Obed Sood  MRN: [id number]  Admission Date: 1/10/2019  Hospital Length of Stay: 1 days  Code Status: Full Code   Attending Provider: Enrique Barker MD   Primary Care Physician: Enrique Barker MD    Subjective:     HPI:  77yo M with recent ARF and LE edema. PSA last week was 97.2. He has no known prostate cancer issues though does report in 1990s he was recommended for PBx but declined at that time. He notes mild urinary issues. Denies hematuria.     PVR today 400cc. Escalante catheter placed in ER with return of 400cc yellow urine.     CT scan today shows nodular, enlarged prostate with extensive LAD. Mild bilateral hydroureteronephrosis with some tortuosity of ureters. Thickened bladder with moderate distention.    Interval History: Tolerating Escalante.  No new complaints    Review of Systems   Constitutional: Negative.  Negative for fever.   HENT: Negative.    Eyes: Negative.    Respiratory: Negative for cough, chest tightness and shortness of breath.    Cardiovascular: Negative for chest pain.   Gastrointestinal: Negative.  Negative for constipation, diarrhea and nausea.   Genitourinary: Positive for difficulty urinating. Negative for flank pain and hematuria.   Musculoskeletal: Negative.    Neurological: Negative.    Psychiatric/Behavioral: Negative.      Objective:     Temp:  [97.6 °F (36.4 °C)-98.3 °F (36.8 °C)] 97.6 °F (36.4 °C)  Pulse:  [63-85] 71  Resp:  [13-20] 18  SpO2:  [95 %-98 %] 95 %  BP: (119-172)/(66-75) 131/67     Body mass index is 26.43 kg/m².    Date 01/11/19 0700 - 01/12/19 0659   Shift 8142-1263 8593-6744 5171-3859 24 Hour Total   INTAKE   Shift Total       OUTPUT   Urine 300   300   Shift Total 300   300   Weight (kg)         Bladder Scan Volume (mL): 402 mL (01/10/19 1226)  Post Void Cath Residual (mL): 380 mL (01/10/19 1125)    Drains     Drain                 Urethral Catheter 01/10/19 1255 16 Fr. less than 1 day                 Physical Exam   Nursing note and vitals reviewed.  Constitutional: He is oriented to person, place, and time. He appears well-developed and well-nourished.   HENT:   Head: Normocephalic.   Eyes: Conjunctivae are normal.   Neck: Normal range of motion. Neck supple. No tracheal deviation present. No thyromegaly present.   Cardiovascular: Normal rate and normal heart sounds.    Pulmonary/Chest: Effort normal and breath sounds normal. No respiratory distress. He has no wheezes.   Abdominal: Soft. Bowel sounds are normal. There is no hepatosplenomegaly. There is no tenderness. There is no rebound and no CVA tenderness. No hernia.   Genitourinary:   Genitourinary Comments: Escalante with yellow urine  Scrotal Edema noted   Musculoskeletal: Normal range of motion. He exhibits no edema or tenderness.   Lymphadenopathy:     He has no cervical adenopathy.   Neurological: He is alert and oriented to person, place, and time.   Skin: Skin is warm and dry. No rash noted. No erythema.     Psychiatric: He has a normal mood and affect. His behavior is normal. Judgment and thought content normal.       Significant Labs:    BMP:  Recent Labs   Lab 01/09/19  1742 01/10/19  1110 01/11/19  0354    136 137   K 4.4 4.5 4.1    102 104   CO2 27 22* 23   BUN 16 17 18   CREATININE 2.2* 2.3* 2.2*   CALCIUM 8.9 9.7 9.0       CBC:   Recent Labs   Lab 01/04/19  1447 01/10/19  1110 01/11/19  0354   WBC 6.51 6.41 6.33   HGB 13.7* 14.0 12.6*   HCT 40.6 41.1 38.3*    277 298       Blood Culture: No results for input(s): LABBLOO in the last 168 hours.  Urine Culture: No results for input(s): LABURIN in the last 168 hours.    Significant Imaging:                    Assessment/Plan:     Bilateral hydronephrosis     - Concern for obstruction from prostate and/or LAD   - Place hugo (done in ER)   - Check JETT in AM       Elevated PSA     - Highly concerning for prostate cancer   - Will need prostate biopsy as an outpatient      Enlarged prostate with urinary obstruction     - Escalante catheter   - Flomax         VTE Risk Mitigation (From admission, onward)        Ordered     heparin (porcine) injection 5,000 Units  Every 8 hours      01/10/19 1716     IP VTE HIGH RISK PATIENT  Once      01/10/19 1716          HECTOR Bardales MD  Urology  [facility] [place]

## 2019-01-11 NOTE — SUBJECTIVE & OBJECTIVE
Interval History: Tolerating Escalante.  No new complaints    Review of Systems   Constitutional: Negative.  Negative for fever.   HENT: Negative.    Eyes: Negative.    Respiratory: Negative for cough, chest tightness and shortness of breath.    Cardiovascular: Negative for chest pain.   Gastrointestinal: Negative.  Negative for constipation, diarrhea and nausea.   Genitourinary: Positive for difficulty urinating. Negative for flank pain and hematuria.   Musculoskeletal: Negative.    Neurological: Negative.    Psychiatric/Behavioral: Negative.      Objective:     Temp:  [97.6 °F (36.4 °C)-98.3 °F (36.8 °C)] 97.6 °F (36.4 °C)  Pulse:  [63-85] 71  Resp:  [13-20] 18  SpO2:  [95 %-98 %] 95 %  BP: (119-172)/(66-75) 131/67     Body mass index is 26.43 kg/m².    Date 01/11/19 0700 - 01/12/19 0659   Shift 2713-3919 1802-4899 6753-4390 24 Hour Total   INTAKE   Shift Total       OUTPUT   Urine 300   300   Shift Total 300   300   Weight (kg)         Bladder Scan Volume (mL): 402 mL (01/10/19 1226)  Post Void Cath Residual (mL): 380 mL (01/10/19 1125)    Drains     Drain                 Urethral Catheter 01/10/19 1255 16 Fr. less than 1 day                Physical Exam   Nursing note and vitals reviewed.  Constitutional: He is oriented to person, place, and time. He appears well-developed and well-nourished.   HENT:   Head: Normocephalic.   Eyes: Conjunctivae are normal.   Neck: Normal range of motion. Neck supple. No tracheal deviation present. No thyromegaly present.   Cardiovascular: Normal rate and normal heart sounds.    Pulmonary/Chest: Effort normal and breath sounds normal. No respiratory distress. He has no wheezes.   Abdominal: Soft. Bowel sounds are normal. There is no hepatosplenomegaly. There is no tenderness. There is no rebound and no CVA tenderness. No hernia.   Genitourinary:   Genitourinary Comments: Escalante with yellow urine  Scrotal Edema noted   Musculoskeletal: Normal range of motion. He exhibits no edema or  tenderness.   Lymphadenopathy:     He has no cervical adenopathy.   Neurological: He is alert and oriented to person, place, and time.   Skin: Skin is warm and dry. No rash noted. No erythema.     Psychiatric: He has a normal mood and affect. His behavior is normal. Judgment and thought content normal.       Significant Labs:    BMP:  Recent Labs   Lab 01/09/19  1742 01/10/19  1110 01/11/19  0354    136 137   K 4.4 4.5 4.1    102 104   CO2 27 22* 23   BUN 16 17 18   CREATININE 2.2* 2.3* 2.2*   CALCIUM 8.9 9.7 9.0       CBC:   Recent Labs   Lab 01/04/19  1447 01/10/19  1110 01/11/19  0354   WBC 6.51 6.41 6.33   HGB 13.7* 14.0 12.6*   HCT 40.6 41.1 38.3*    277 298       Blood Culture: No results for input(s): LABBLOO in the last 168 hours.  Urine Culture: No results for input(s): LABURIN in the last 168 hours.    Significant Imaging:

## 2019-01-11 NOTE — NURSING
Pt aaox4, free of falls or injuries. Tylenol given for pain with moderate improvement. Urine in catheter noted to be red with small clots. Ordered received to irrigate catheter prn for clots. Irrigated X2 with sterile water. 3 small clots noted first time arias was irrigated. Second time no clots noted. Urine seems to be getting more clear.

## 2019-01-11 NOTE — PROGRESS NOTES
[facility]  Hematology/Oncology  Progress Note    Patient Name: Obed Sood  Admission Date: 1/10/2019  Hospital Length of Stay: 1 days  Code Status: Full Code     Subjective:     Mr. Sood is a pleasant 77 YO gentleman with BPH who began to have fabien leg edema in Nov 2018. Pt was treated conservatively and underwent out pt work up. Pt began to have RLE non pitting edema and labs revealed MIAN. Pt was given IV fluid out pt and further labs obtained. PSA was found to be elevated. CT ab pelvis today revealed moderate bilateral hydronephrosis, conglomerated retroperitoneal adenopathy present surrounding the abdominal aorta and lower inferior vena cava with loss of the normal fat, Iliac chain adenopathy with bilobed soft tissue density nodule to the right of the urinary bladder anteriorly measuring 2.8 x 1.5 cm in size as well as enlarged prostate gland with a nodular projection posteriorly on the left.      Few weeks ago, he began to have lower back pain and started taking naprosyn OTC. No recent wt changes. Had difficulty ambulating due to leg edema, R> L.         Interval History:     01/11/19: scrotal swelling improving. Denies fever or chills.     Oncology Treatment Plan:   [No treatment plan]    Medications:  Continuous Infusions:  Scheduled Meds:   heparin (porcine)  5,000 Units Subcutaneous Q8H    tamsulosin  0.4 mg Oral Daily     PRN Meds:acetaminophen, ondansetron, sodium chloride 0.9%     Review of Systems     Constitutional: Denies any weigh or appetite changes.   Eyes: no visual changes   ENT: no nasal congestion. Denies sore throat with odynophagia   Respiratory: No cough, SOB, exertional dyspnea or  hemoptysis   Cardiovascular: no chest pain or palpitations   Gastrointestinal: no nausea, vomiting or abdominal pain. Normal bowl habits   Hematologic/Lymphatic: denies hematuria  Musculoskeletal: back pain  Neurological: no seizures or tremors, gait or balance prblems  Skin: No  rashes or lesions  Psych: Denies any anxiety, depression or insomnia      Objective:     Vital Signs (Most Recent):  Temp: 98 °F (36.7 °C) (01/11/19 0424)  Pulse: 73 (01/11/19 0424)  Resp: 19 (01/11/19 0424)  BP: 119/70 (01/11/19 0424)  SpO2: 97 % (01/11/19 0424) Vital Signs (24h Range):  Temp:  [97.9 °F (36.6 °C)-98.3 °F (36.8 °C)] 98 °F (36.7 °C)  Pulse:  [63-85] 73  Resp:  [13-20] 19  SpO2:  [95 %-98 %] 97 %  BP: (119-172)/(66-75) 119/70        Body mass index is 26.43 kg/m².  Body surface area is 1.99 meters squared.      Intake/Output Summary (Last 24 hours) at 1/11/2019 0703  Last data filed at 1/10/2019 1800  Gross per 24 hour   Intake 128 ml   Output 1155 ml   Net -1027 ml       Physical Exam    General: NAD, sitting up in bed   Head: normocephalic, atraumatic   Eyes: conjunctivae pink  Throat: No erythema or post nasal discharge   Neck: supple, no LAD   Lungs: CTAB   Heart: S1, S2, RRR   Abdomen: + BS x 4 quadrants, slightly distended  : Escalante with yellow urine, scrotal edema improving   Extremities: leg edema R>L  Skin: abdominal wall edema   MS: No sig ROM diff of extremities  Neuro: A&O x 3  Psy: pleasant, affect is congruent to mood     Significant Labs:   CBC:   Recent Labs   Lab 01/10/19  1110 01/11/19 0354   WBC 6.41 6.33   HGB 14.0 12.6*   HCT 41.1 38.3*    298   , CMP:   Recent Labs   Lab 01/10/19  1110 01/11/19 0354    137   K 4.5 4.1    104   CO2 22* 23   GLU 84 93   BUN 17 18   CREATININE 2.3* 2.2*   CALCIUM 9.7 9.0   PROT 7.5  --    ALBUMIN 3.6  --    BILITOT 0.6  --    ALKPHOS 83  --    AST 17  --    ALT 8*  --    ANIONGAP 12 10   EGFRNONAA 26* 28*   , LDH: No results for input(s): LDHCSF, BFSOURCE in the last 48 hours., Reticulocytes: No results for input(s): RETIC in the last 48 hours., Tumor Markers: No results for input(s): PSA, CEA, , AFPTM, [id number],  in the last 48 hours.    Invalid input(s): ALGTM, Uric Acid No results for input(s): URICACID in the last  48 hours. and Urine Studies:   Recent Labs   Lab 01/10/19  1125   COLORU Yellow   APPEARANCEUA Clear   PHUR 5.0   SPECGRAV 1.010   PROTEINUA Negative   GLUCUA Negative   KETONESU Negative   BILIRUBINUA Negative   OCCULTUA 2+*   NITRITE Negative   UROBILINOGEN Negative   LEUKOCYTESUR Trace*   RBCUA 3   WBCUA 4   BACTERIA Rare   SQUAMEPITHEL 1       Diagnostic Results:      Assessment/Plan:     Active Diagnoses:     Diagnosis Date Noted POA    MIAN (acute kidney injury) [N17.9]  - most likely due to bilateral moderate hydronephrosis and bladder outlet obstruction  - s/p Escalante insertion  - urology consulted  - cr - no improvement   - pt need cysto and stent placement- will defer to urology     01/10/2019 Yes    Elevated PSA [R97.20]- with significant retroperitoneal lymphadenopathy   - highly suspicious of primary prostate Ca  - need cystoscopy and bx  - urology follow up     01/10/2019 Yes    Bilateral hydronephrosis [N13.30]   01/10/2019 Yes    Enlarged prostate with urinary obstruction [N40.1, N13.8]     - on Flomax    01/04/2019 Yes       Problems Resolved During this Admission:      DVT pro: Heparin     Back pain : stable     Enrique Barker M.D  Internal Medicine & Geriatric Medicine  Hematology & Oncology  Palliative Medicine    [address], [address]  [place] [id number] [phone] (Office)  [phone] (Fax)

## 2019-01-12 PROBLEM — R31.0 GROSS HEMATURIA: Status: ACTIVE | Noted: 2019-01-12

## 2019-01-12 LAB
ANION GAP SERPL CALC-SCNC: 9 MMOL/L
BASOPHILS # BLD AUTO: 0.01 K/UL
BASOPHILS NFR BLD: 0.1 %
BUN SERPL-MCNC: 19 MG/DL
CALCIUM SERPL-MCNC: 9.1 MG/DL
CHLORIDE SERPL-SCNC: 101 MMOL/L
CO2 SERPL-SCNC: 25 MMOL/L
CREAT SERPL-MCNC: 2.1 MG/DL
DIFFERENTIAL METHOD: ABNORMAL
EOSINOPHIL # BLD AUTO: 0.1 K/UL
EOSINOPHIL NFR BLD: 0.9 %
ERYTHROCYTE [DISTWIDTH] IN BLOOD BY AUTOMATED COUNT: 14.7 %
EST. GFR  (AFRICAN AMERICAN): 34 ML/MIN/1.73 M^2
EST. GFR  (NON AFRICAN AMERICAN): 29 ML/MIN/1.73 M^2
GLUCOSE SERPL-MCNC: 144 MG/DL
HCT VFR BLD AUTO: 37.9 %
HGB BLD-MCNC: 12.5 G/DL
LYMPHOCYTES # BLD AUTO: 0.8 K/UL
LYMPHOCYTES NFR BLD: 9.9 %
MCH RBC QN AUTO: 29.3 PG
MCHC RBC AUTO-ENTMCNC: 33 G/DL
MCV RBC AUTO: 89 FL
MONOCYTES # BLD AUTO: 0.8 K/UL
MONOCYTES NFR BLD: 9.6 %
NEUTROPHILS # BLD AUTO: 6.2 K/UL
NEUTROPHILS NFR BLD: 79.4 %
PLATELET # BLD AUTO: 300 K/UL
PMV BLD AUTO: 10 FL
POTASSIUM SERPL-SCNC: 3.9 MMOL/L
RBC # BLD AUTO: 4.27 M/UL
SODIUM SERPL-SCNC: 135 MMOL/L
WBC # BLD AUTO: 7.8 K/UL

## 2019-01-12 PROCEDURE — 25000003 PHARM REV CODE 250: Performed by: EMERGENCY MEDICINE

## 2019-01-12 PROCEDURE — 99232 PR SUBSEQUENT HOSPITAL CARE,LEVL II: ICD-10-PCS | Mod: ,,, | Performed by: UROLOGY

## 2019-01-12 PROCEDURE — 11000001 HC ACUTE MED/SURG PRIVATE ROOM

## 2019-01-12 PROCEDURE — 80048 BASIC METABOLIC PNL TOTAL CA: CPT

## 2019-01-12 PROCEDURE — 85025 COMPLETE CBC W/AUTO DIFF WBC: CPT

## 2019-01-12 PROCEDURE — 99232 SBSQ HOSP IP/OBS MODERATE 35: CPT | Mod: ,,, | Performed by: UROLOGY

## 2019-01-12 PROCEDURE — 63600175 PHARM REV CODE 636 W HCPCS: Performed by: EMERGENCY MEDICINE

## 2019-01-12 PROCEDURE — 36415 COLL VENOUS BLD VENIPUNCTURE: CPT

## 2019-01-12 RX ADMIN — HEPARIN SODIUM 5000 UNITS: 5000 INJECTION, SOLUTION INTRAVENOUS; SUBCUTANEOUS at 02:01

## 2019-01-12 RX ADMIN — ACETAMINOPHEN 650 MG: 325 TABLET ORAL at 08:01

## 2019-01-12 RX ADMIN — HEPARIN SODIUM 5000 UNITS: 5000 INJECTION, SOLUTION INTRAVENOUS; SUBCUTANEOUS at 05:01

## 2019-01-12 RX ADMIN — TAMSULOSIN HYDROCHLORIDE 0.4 MG: 0.4 CAPSULE ORAL at 09:01

## 2019-01-12 RX ADMIN — ACETAMINOPHEN 650 MG: 325 TABLET ORAL at 12:01

## 2019-01-12 RX ADMIN — HEPARIN SODIUM 5000 UNITS: 5000 INJECTION, SOLUTION INTRAVENOUS; SUBCUTANEOUS at 09:01

## 2019-01-12 NOTE — PROGRESS NOTES
[facility]  Urology  Progress Note    Patient Name: Obed Sood  MRN: [id number]  Admission Date: 1/10/2019  Hospital Length of Stay: 2 days  Code Status: Full Code   Attending Provider: Enrique Barker MD   Primary Care Physician: Enrique Barker MD    Subjective:     HPI:  79yo M with recent ARF and LE edema. PSA last week was 97.2. He has no known prostate cancer issues though does report in 1990s he was recommended for PBx but declined at that time. He notes mild urinary issues. Denies hematuria.     PVR today 400cc. Escalante catheter placed in ER with return of 400cc yellow urine.     CT scan today shows nodular, enlarged prostate with extensive LAD. Mild bilateral hydroureteronephrosis with some tortuosity of ureters. Thickened bladder with moderate distention.    Interval History: hematuria yesterday, resolved now.  He has no new complaints    Review of Systems   Constitutional: Negative.  Negative for fever.   HENT: Negative.    Eyes: Negative.    Respiratory: Negative for cough, chest tightness and shortness of breath.    Cardiovascular: Negative for chest pain.   Gastrointestinal: Negative.  Negative for constipation, diarrhea and nausea.   Genitourinary: Positive for hematuria. Negative for flank pain.   Musculoskeletal: Negative.    Neurological: Negative.    Psychiatric/Behavioral: Negative.      Objective:     Temp:  [97.6 °F (36.4 °C)-98.5 °F (36.9 °C)] 98 °F (36.7 °C)  Pulse:  [71-87] 83  Resp:  [18-19] 19  SpO2:  [93 %-97 %] 96 %  BP: (131-153)/(61-76) 153/76     Body mass index is 26.93 kg/m².       Bladder Scan Volume (mL): 402 mL (01/10/19 1226)  Post Void Cath Residual (mL): 380 mL (01/10/19 1125)    Drains     Drain                 Urethral Catheter 01/10/19 1255 16 Fr. 1 day                Physical Exam   Nursing note and vitals reviewed.  Constitutional: He is oriented to person, place, and time. He appears well-developed and well-nourished.   HENT:   Head:  Normocephalic.   Eyes: Conjunctivae are normal.   Neck: Normal range of motion. Neck supple. No tracheal deviation present. No thyromegaly present.   Cardiovascular: Normal rate and normal heart sounds.    Pulmonary/Chest: Effort normal and breath sounds normal. No respiratory distress. He has no wheezes.   Abdominal: Soft. Bowel sounds are normal. There is no hepatosplenomegaly. There is no tenderness. There is no rebound and no CVA tenderness. No hernia.   Genitourinary:   Genitourinary Comments: Arias with yellow urine   Musculoskeletal: Normal range of motion. He exhibits no edema or tenderness.   Lymphadenopathy:     He has no cervical adenopathy.   Neurological: He is alert and oriented to person, place, and time.   Skin: Skin is warm and dry. No rash noted. No erythema.     Psychiatric: He has a normal mood and affect. His behavior is normal. Judgment and thought content normal.       Significant Labs:    BMP:  Recent Labs   Lab 01/09/19  1742 01/10/19  1110 01/11/19  0354    136 137   K 4.4 4.5 4.1    102 104   CO2 27 22* 23   BUN 16 17 18   CREATININE 2.2* 2.3* 2.2*   CALCIUM 8.9 9.7 9.0       CBC:   Recent Labs   Lab 01/10/19  1110 01/11/19  0354 01/12/19  0530   WBC 6.41 6.33 7.80   HGB 14.0 12.6* 12.5*   HCT 41.1 38.3* 37.9*    298 300       Blood Culture: No results for input(s): LABBLOO in the last 168 hours.  Urine Culture: No results for input(s): LABURIN in the last 168 hours.    Significant Imaging:                    Assessment/Plan:     Gross hematuria    He will need a cystoscopy as an outpatient  Follow culture  Likely related to Arias     Bilateral hydronephrosis     - Concern for obstruction from prostate and/or LAD   - Place arias (done in ER)   - Check JETT today, as long as hydro is not worse then he can go home today from a  perspective.  Follow up next week for further evaluation and VOT       Elevated PSA     - Highly concerning for prostate cancer   - Will need  prostate biopsy as an outpatient     Enlarged prostate with urinary obstruction     - Escalante catheter, home with Escalante   - Flomax         VTE Risk Mitigation (From admission, onward)        Ordered     heparin (porcine) injection 5,000 Units  Every 8 hours      01/10/19 1716     IP VTE HIGH RISK PATIENT  Once      01/10/19 1716          HECTOR Bardales MD  Urology  [facility]

## 2019-01-12 NOTE — PROGRESS NOTES
[facility]  Hematology/Oncology  Progress Note    Patient Name: Obed Sood  Admission Date: 1/10/2019  Hospital Length of Stay: 2 days  Code Status: Full Code     Subjective:     Mr. Sood is a pleasant 79 YO gentleman with BPH who began to have fabien leg edema in Nov 2018. Pt was treated conservatively and underwent out pt work up. Pt began to have RLE non pitting edema and labs revealed MIAN. Pt was given IV fluid out pt and further labs obtained. PSA was found to be elevated. CT ab pelvis today revealed moderate bilateral hydronephrosis, conglomerated retroperitoneal adenopathy present surrounding the abdominal aorta and lower inferior vena cava with loss of the normal fat, Iliac chain adenopathy with bilobed soft tissue density nodule to the right of the urinary bladder anteriorly measuring 2.8 x 1.5 cm in size as well as enlarged prostate gland with a nodular projection posteriorly on the left.      Few weeks ago, he began to have lower back pain and started taking naprosyn OTC. No recent wt changes. Had difficulty ambulating due to leg edema, R> L.     Interval History:     01/12/19: states pelvic discomfort improving. Tolerating Escalante. Denies fever or chills.   01/11/19: scrotal swelling improving. Denies fever or chills.     Oncology Treatment Plan:   [No treatment plan]    Medications:  Continuous Infusions:  Scheduled Meds:   heparin (porcine)  5,000 Units Subcutaneous Q8H    tamsulosin  0.4 mg Oral Daily     PRN Meds:acetaminophen, ondansetron, sodium chloride 0.9%     Review of Systems     Constitutional: Denies any weigh or appetite changes.   Eyes: no visual changes   ENT: no nasal congestion. Denies sore throat with odynophagia   Respiratory: No cough, SOB, exertional dyspnea or  hemoptysis   Cardiovascular: no chest pain or palpitations   Gastrointestinal: no nausea, vomiting or abdominal pain. Normal bowl habits   Hematologic/Lymphatic: mild hematuria   Musculoskeletal:  back pain  Neurological: no seizures or tremors, gait or balance prblems  Skin: No rashes or lesions  Psych: Denies any anxiety, depression or insomnia      Objective:     Vital Signs (Most Recent):  Temp: 97.8 °F (36.6 °C) (01/12/19 0730)  Pulse: 80 (01/12/19 0730)  Resp: 18 (01/12/19 0730)  BP: 134/69 (01/12/19 0730)  SpO2: 95 % (01/12/19 0730) Vital Signs (24h Range):  Temp:  [97.8 °F (36.6 °C)-98.5 °F (36.9 °C)] 97.8 °F (36.6 °C)  Pulse:  [80-87] 80  Resp:  [18-19] 18  SpO2:  [93 %-97 %] 95 %  BP: (131-153)/(61-76) 134/69     Weight: 82.7 kg (182 lb 6.1 oz)  Body mass index is 26.93 kg/m².  Body surface area is 2.01 meters squared.      Intake/Output Summary (Last 24 hours) at 1/12/2019 0918  Last data filed at 1/11/2019 1832  Gross per 24 hour   Intake 240 ml   Output 300 ml   Net -60 ml       Physical Exam    General: NAD, sitting up in bed. Wife at the bedside   Head: normocephalic, atraumatic   Eyes: conjunctivae pink  Throat: No erythema or post nasal discharge   Neck: supple, no LAD   Lungs: CTAB   Heart: S1, S2, RRR   Abdomen: + BS x 4 quadrants, slightly distended  : Escalante with yellow urine, scrotal edema   Extremities: leg edema R>L  Skin: abdominal wall edema- better    MS: No sig ROM diff of extremities  Neuro: A&O x 3  Psy: pleasant, affect is congruent to mood     Significant Labs:   CBC:   Recent Labs   Lab 01/10/19  1110 01/11/19  0354 01/12/19  0530   WBC 6.41 6.33 7.80   HGB 14.0 12.6* 12.5*   HCT 41.1 38.3* 37.9*    298 300   , CMP:   Recent Labs   Lab 01/10/19  1110 01/11/19  0354 01/12/19 0530    137 135*   K 4.5 4.1 3.9    104 101   CO2 22* 23 25   GLU 84 93 144*   BUN 17 18 19   CREATININE 2.3* 2.2* 2.1*   CALCIUM 9.7 9.0 9.1   PROT 7.5  --   --    ALBUMIN 3.6  --   --    BILITOT 0.6  --   --    ALKPHOS 83  --   --    AST 17  --   --    ALT 8*  --   --    ANIONGAP 12 10 9   EGFRNONAA 26* 28* 29*   , LDH: No results for input(s): LDHCSF, BFSOURCE in the last 48 hours.,  Reticulocytes: No results for input(s): RETIC in the last 48 hours., Tumor Markers: No results for input(s): PSA, CEA, , AFPTM, [id number],  in the last 48 hours.    Invalid input(s): ALGTM, Uric Acid No results for input(s): URICACID in the last 48 hours. and Urine Studies:   Recent Labs   Lab 01/10/19  1125   COLORU Yellow   APPEARANCEUA Clear   PHUR 5.0   SPECGRAV 1.010   PROTEINUA Negative   GLUCUA Negative   KETONESU Negative   BILIRUBINUA Negative   OCCULTUA 2+*   NITRITE Negative   UROBILINOGEN Negative   LEUKOCYTESUR Trace*   RBCUA 3   WBCUA 4   BACTERIA Rare   SQUAMEPITHEL 1       Diagnostic Results:      Assessment/Plan:     Active Diagnoses:     Diagnosis Date Noted POA    MIAN (acute kidney injury) [N17.9]  - most likely due to bilateral moderate hydronephrosis and bladder outlet obstruction  - s/p Escalante insertion  - urology consulted  - cr - ,o;d   - pt need cysto and stent placement- will defer to urology - depending on today's US     01/10/2019 Yes    Elevated PSA [R97.20]- with significant retroperitoneal lymphadenopathy   - highly suspicious of primary prostate Ca  - need cystoscopy and bx  - urology follow up     01/10/2019 Yes    Bilateral hydronephrosis [N13.30]  - US pending today    01/10/2019 Yes    Enlarged prostate with urinary obstruction [N40.1, N13.8]     - on Flomax    01/04/2019 Yes       Problems Resolved During this Admission:      DVT pro: Heparin     Back pain : stable     If US shows improvement in hydronephrosis, will dc home for him to f/u with urology out pt     Discussed care plan in detail with pt and wife.     Enrique Barker M.D  Internal Medicine & Geriatric Medicine  Hematology & Oncology  Palliative Medicine    [address], [address]  [place] [address] [phone] (Office)  [phone] (Fax)

## 2019-01-12 NOTE — SUBJECTIVE & OBJECTIVE
Interval History: hematuria yesterday, resolved now.  He has no new complaints    Review of Systems   Constitutional: Negative.  Negative for fever.   HENT: Negative.    Eyes: Negative.    Respiratory: Negative for cough, chest tightness and shortness of breath.    Cardiovascular: Negative for chest pain.   Gastrointestinal: Negative.  Negative for constipation, diarrhea and nausea.   Genitourinary: Positive for hematuria. Negative for flank pain.   Musculoskeletal: Negative.    Neurological: Negative.    Psychiatric/Behavioral: Negative.      Objective:     Temp:  [97.6 °F (36.4 °C)-98.5 °F (36.9 °C)] 98 °F (36.7 °C)  Pulse:  [71-87] 83  Resp:  [18-19] 19  SpO2:  [93 %-97 %] 96 %  BP: (131-153)/(61-76) 153/76     Body mass index is 26.93 kg/m².       Bladder Scan Volume (mL): 402 mL (01/10/19 1226)  Post Void Cath Residual (mL): 380 mL (01/10/19 1125)    Drains     Drain                 Urethral Catheter 01/10/19 1255 16 Fr. 1 day                Physical Exam   Nursing note and vitals reviewed.  Constitutional: He is oriented to person, place, and time. He appears well-developed and well-nourished.   HENT:   Head: Normocephalic.   Eyes: Conjunctivae are normal.   Neck: Normal range of motion. Neck supple. No tracheal deviation present. No thyromegaly present.   Cardiovascular: Normal rate and normal heart sounds.    Pulmonary/Chest: Effort normal and breath sounds normal. No respiratory distress. He has no wheezes.   Abdominal: Soft. Bowel sounds are normal. There is no hepatosplenomegaly. There is no tenderness. There is no rebound and no CVA tenderness. No hernia.   Genitourinary:   Genitourinary Comments: Escalante with yellow urine   Musculoskeletal: Normal range of motion. He exhibits no edema or tenderness.   Lymphadenopathy:     He has no cervical adenopathy.   Neurological: He is alert and oriented to person, place, and time.   Skin: Skin is warm and dry. No rash noted. No erythema.     Psychiatric: He has a  normal mood and affect. His behavior is normal. Judgment and thought content normal.       Significant Labs:    BMP:  Recent Labs   Lab 01/09/19  1742 01/10/19  1110 01/11/19  0354    136 137   K 4.4 4.5 4.1    102 104   CO2 27 22* 23   BUN 16 17 18   CREATININE 2.2* 2.3* 2.2*   CALCIUM 8.9 9.7 9.0       CBC:   Recent Labs   Lab 01/10/19  1110 01/11/19  0354 01/12/19  0530   WBC 6.41 6.33 7.80   HGB 14.0 12.6* 12.5*   HCT 41.1 38.3* 37.9*    298 300       Blood Culture: No results for input(s): LABBLOO in the last 168 hours.  Urine Culture: No results for input(s): LABURIN in the last 168 hours.    Significant Imaging:

## 2019-01-12 NOTE — PLAN OF CARE
Problem: Adult Inpatient Plan of Care  Goal: Plan of Care Review  Outcome: Ongoing (interventions implemented as appropriate)   01/12/19 1505   Plan of Care Review   Plan of Care Reviewed With patient       Comments: Plan of care reviewed with patient. He is alert and oriented, able to make needs known, remains free of injury. Tele  Monitor in place. Patient vssaf, he denies pain thru shift. Escalante draining straw colored urine, minimal sediment. He is tolerating diet well, ambulating to restroom as needed. Bed in low locked position, call light in reach. Will continue to monitor for safety.

## 2019-01-12 NOTE — ASSESSMENT & PLAN NOTE
- Concern for obstruction from prostate and/or LAD   - Place arias (done in ER)   - Check JETT today, as long as hydro is not worse then he can go home today from a  perspective.  Follow up next week for further evaluation and VOT

## 2019-01-13 LAB
ANION GAP SERPL CALC-SCNC: 9 MMOL/L
BASOPHILS # BLD AUTO: 0.01 K/UL
BASOPHILS NFR BLD: 0.1 %
BUN SERPL-MCNC: 19 MG/DL
CALCIUM SERPL-MCNC: 9.3 MG/DL
CHLORIDE SERPL-SCNC: 101 MMOL/L
CO2 SERPL-SCNC: 25 MMOL/L
CREAT SERPL-MCNC: 2.1 MG/DL
DIFFERENTIAL METHOD: ABNORMAL
EOSINOPHIL # BLD AUTO: 0.3 K/UL
EOSINOPHIL NFR BLD: 4 %
ERYTHROCYTE [DISTWIDTH] IN BLOOD BY AUTOMATED COUNT: 14.7 %
EST. GFR  (AFRICAN AMERICAN): 34 ML/MIN/1.73 M^2
EST. GFR  (NON AFRICAN AMERICAN): 29 ML/MIN/1.73 M^2
GLUCOSE SERPL-MCNC: 91 MG/DL
HCT VFR BLD AUTO: 36.9 %
HGB BLD-MCNC: 12.5 G/DL
LYMPHOCYTES # BLD AUTO: 0.9 K/UL
LYMPHOCYTES NFR BLD: 11.8 %
MCH RBC QN AUTO: 30 PG
MCHC RBC AUTO-ENTMCNC: 33.9 G/DL
MCV RBC AUTO: 89 FL
MONOCYTES # BLD AUTO: 0.8 K/UL
MONOCYTES NFR BLD: 11.4 %
NEUTROPHILS # BLD AUTO: 5.3 K/UL
NEUTROPHILS NFR BLD: 72.7 %
PLATELET # BLD AUTO: 294 K/UL
PMV BLD AUTO: 10.2 FL
POTASSIUM SERPL-SCNC: 4 MMOL/L
RBC # BLD AUTO: 4.17 M/UL
SODIUM SERPL-SCNC: 135 MMOL/L
WBC # BLD AUTO: 7.27 K/UL

## 2019-01-13 PROCEDURE — 85025 COMPLETE CBC W/AUTO DIFF WBC: CPT

## 2019-01-13 PROCEDURE — 11000001 HC ACUTE MED/SURG PRIVATE ROOM

## 2019-01-13 PROCEDURE — 99232 PR SUBSEQUENT HOSPITAL CARE,LEVL II: ICD-10-PCS | Mod: ,,, | Performed by: UROLOGY

## 2019-01-13 PROCEDURE — 63600175 PHARM REV CODE 636 W HCPCS: Performed by: EMERGENCY MEDICINE

## 2019-01-13 PROCEDURE — 25000003 PHARM REV CODE 250: Performed by: EMERGENCY MEDICINE

## 2019-01-13 PROCEDURE — 63600175 PHARM REV CODE 636 W HCPCS: Performed by: UROLOGY

## 2019-01-13 PROCEDURE — 36415 COLL VENOUS BLD VENIPUNCTURE: CPT

## 2019-01-13 PROCEDURE — 99232 SBSQ HOSP IP/OBS MODERATE 35: CPT | Mod: ,,, | Performed by: UROLOGY

## 2019-01-13 PROCEDURE — 80048 BASIC METABOLIC PNL TOTAL CA: CPT

## 2019-01-13 RX ORDER — CIPROFLOXACIN 2 MG/ML
400 INJECTION, SOLUTION INTRAVENOUS
Status: DISCONTINUED | OUTPATIENT
Start: 2019-01-13 | End: 2019-01-15 | Stop reason: HOSPADM

## 2019-01-13 RX ADMIN — CIPROFLOXACIN 400 MG: 2 INJECTION, SOLUTION INTRAVENOUS at 11:01

## 2019-01-13 RX ADMIN — HEPARIN SODIUM 5000 UNITS: 5000 INJECTION, SOLUTION INTRAVENOUS; SUBCUTANEOUS at 06:01

## 2019-01-13 RX ADMIN — TAMSULOSIN HYDROCHLORIDE 0.4 MG: 0.4 CAPSULE ORAL at 08:01

## 2019-01-13 RX ADMIN — ACETAMINOPHEN 650 MG: 325 TABLET ORAL at 05:01

## 2019-01-13 RX ADMIN — HEPARIN SODIUM 5000 UNITS: 5000 INJECTION, SOLUTION INTRAVENOUS; SUBCUTANEOUS at 02:01

## 2019-01-13 NOTE — PROGRESS NOTES
[facility]  Hematology/Oncology  Progress Note    Patient Name: Obed Sood  Admission Date: 1/10/2019  Hospital Length of Stay: 3 days  Code Status: Full Code     Subjective:     Mr. Sood is a pleasant 77 YO gentleman with BPH who began to have fabien leg edema in Nov 2018. Pt was treated conservatively and underwent out pt work up. Pt began to have RLE non pitting edema and labs revealed MIAN. Pt was given IV fluid out pt and further labs obtained. PSA was found to be elevated. CT ab pelvis today revealed moderate bilateral hydronephrosis, conglomerated retroperitoneal adenopathy present surrounding the abdominal aorta and lower inferior vena cava with loss of the normal fat, Iliac chain adenopathy with bilobed soft tissue density nodule to the right of the urinary bladder anteriorly measuring 2.8 x 1.5 cm in size as well as enlarged prostate gland with a nodular projection posteriorly on the left.      Few weeks ago, he began to have lower back pain and started taking naprosyn OTC. No recent wt changes. Had difficulty ambulating due to leg edema, R> L.     Interval History:     01/13/19: scrotal edema stable. Tolerating Escalante. Denies fever or chills. Had US done yesterday.   01/12/19: states pelvic discomfort improving. Tolerating Escalante. Denies fever or chills.   01/11/19: scrotal swelling improving. Denies fever or chills.     Oncology Treatment Plan:   [No treatment plan]    Medications:  Continuous Infusions:  Scheduled Meds:   heparin (porcine)  5,000 Units Subcutaneous Q8H    tamsulosin  0.4 mg Oral Daily     PRN Meds:acetaminophen, ondansetron, sodium chloride 0.9%     Review of Systems     Constitutional: Denies any weigh or appetite changes.   Eyes: no visual changes   ENT: no nasal congestion. Denies sore throat with odynophagia   Respiratory: No cough, SOB, exertional dyspnea or  hemoptysis   Cardiovascular: no chest pain or palpitations   Gastrointestinal: no nausea, vomiting  or abdominal pain. Normal bowl habits   Hematologic/Lymphatic: mild hematuria   Musculoskeletal: back pain  : scrotal edema   Neurological: no seizures or tremors, gait or balance prblems  Skin: No rashes or lesions  Psych: Denies any anxiety, depression or insomnia      Objective:     Vital Signs (Most Recent):  Temp: 98.4 °F (36.9 °C) (01/13/19 0742)  Pulse: 78 (01/13/19 0742)  Resp: 18 (01/13/19 0742)  BP: 121/61 (01/13/19 0742)  SpO2: 95 % (01/13/19 0742) Vital Signs (24h Range):  Temp:  [98 °F (36.7 °C)-98.9 °F (37.2 °C)] 98.4 °F (36.9 °C)  Pulse:  [78-87] 78  Resp:  [18] 18  SpO2:  [93 %-95 %] 95 %  BP: (121-165)/(61-83) 121/61     Weight: 82.7 kg (182 lb 6.1 oz)  Body mass index is 26.93 kg/m².  Body surface area is 2.01 meters squared.      Intake/Output Summary (Last 24 hours) at 1/13/2019 0809  Last data filed at 1/13/2019 0400  Gross per 24 hour   Intake 120 ml   Output 1220 ml   Net -1100 ml       Physical Exam    General: NAD, sitting up in bed. Wife at the bedside   Head: normocephalic, atraumatic   Eyes: conjunctivae pink  Throat: No erythema or post nasal discharge   Neck: supple, no LAD   Lungs: CTAB   Heart: S1, S2, RRR   Abdomen: + BS x 4 quadrants, slightly distended  : Escalante with yellow urine, scrotal edema- marginally better    Extremities: leg edema R>L  Skin: abdominal wall edema- better    MS: No sig ROM diff of extremities  Neuro: A&O x 3  Psy: pleasant, affect is congruent to mood     Significant Labs:   CBC:   Recent Labs   Lab 01/12/19  0530 01/13/19  0505   WBC 7.80 7.27   HGB 12.5* 12.5*   HCT 37.9* 36.9*    294   , CMP:   Recent Labs   Lab 01/12/19  0530   *   K 3.9      CO2 25   *   BUN 19   CREATININE 2.1*   CALCIUM 9.1   ANIONGAP 9   EGFRNONAA 29*   , LDH: No results for input(s): LDHCSF, BFSOURCE in the last 48 hours., Reticulocytes: No results for input(s): RETIC in the last 48 hours., Tumor Markers: No results for input(s): PSA, CEA, , AFPTM,  [id number],  in the last 48 hours.    Invalid input(s): ALGTM, Uric Acid No results for input(s): URICACID in the last 48 hours. and Urine Studies:   No results for input(s): COLORU, APPEARANCEUA, PHUR, SPECGRAV, PROTEINUA, GLUCUA, KETONESU, BILIRUBINUA, OCCULTUA, NITRITE, UROBILINOGEN, LEUKOCYTESUR, RBCUA, WBCUA, BACTERIA, SQUAMEPITHEL, HYALINECASTS in the last 48 hours.    Invalid input(s): WRIGHTSUR    Diagnostic Results:      Assessment/Plan:     Active Diagnoses:     Diagnosis Date Noted POA    MIAN (acute kidney injury) [N17.9]  - most likely due to bilateral moderate hydronephrosis and bladder outlet obstruction  - s/p Escalante insertion  - urology consulted  - cr elevated at 2.1 but stable   -   US shows no improvement    - pt need cysto and stent placement- will defer to urology -appreciate input      01/10/2019 Yes    Elevated PSA [R97.20]- with significant retroperitoneal lymphadenopathy   - highly suspicious of primary prostate Ca  - need cystoscopy and bx  - urology follow up     01/10/2019 Yes    Bilateral hydronephrosis [N13.30]  - US - no improvement    01/10/2019 Yes    Enlarged prostate with urinary obstruction [N40.1, N13.8]     - on Flomax    01/04/2019 Yes       Problems Resolved During this Admission:      DVT pro: Heparin     Back pain : stable     Discussed US finding with pt and family.   Pt and family would like to have fabien uretal stent placement and bx before dc and I agree. Will defer to urology if it can be done Monday       Enrique Barker M.D  Internal Medicine & Geriatric Medicine  Hematology & Oncology  Palliative Medicine    [address], [address]  [place] [address]  [phone] (Office)  [phone] (Fax)

## 2019-01-13 NOTE — ASSESSMENT & PLAN NOTE
Cr plateau, hydro unchanged  Plan for cystoscopy with retrograde pyelogram, possible stent 1/14/2019

## 2019-01-13 NOTE — ASSESSMENT & PLAN NOTE
Cystoscopy with retrograde pyelogram Monday 1/14/2019  Follow culture  Likely related to Escalante

## 2019-01-13 NOTE — PROGRESS NOTES
[facility]  Urology  Progress Note    Patient Name: Obed Sood  MRN: [id number]  Admission Date: 1/10/2019  Hospital Length of Stay: 3 days  Code Status: Full Code   Attending Provider: Enrique Barker MD   Primary Care Physician: Enrique Barker MD    Subjective:     HPI:  79yo M with recent ARF and LE edema. PSA last week was 97.2. He has no known prostate cancer issues though does report in 1990s he was recommended for PBx but declined at that time. He notes mild urinary issues. Denies hematuria.     PVR today 400cc. Escalante catheter placed in ER with return of 400cc yellow urine.     CT scan today shows nodular, enlarged prostate with extensive LAD. Mild bilateral hydroureteronephrosis with some tortuosity of ureters. Thickened bladder with moderate distention.    Interval History: He is tolerating his Escalante.  He feels well today.  No pain.     Review of Systems   Constitutional: Negative.    HENT: Negative.    Eyes: Negative.    Respiratory: Negative for cough, chest tightness and shortness of breath.    Cardiovascular: Negative for chest pain.   Gastrointestinal: Negative.  Negative for constipation, diarrhea and nausea.   Genitourinary: Positive for difficulty urinating. Negative for hematuria.   Musculoskeletal: Negative.    Neurological: Negative.    Psychiatric/Behavioral: Negative.      Objective:     Temp:  [98 °F (36.7 °C)-98.9 °F (37.2 °C)] 98.4 °F (36.9 °C)  Pulse:  [78-87] 78  Resp:  [18] 18  SpO2:  [93 %-95 %] 95 %  BP: (121-165)/(61-83) 121/61     Body mass index is 26.93 kg/m².       Bladder Scan Volume (mL): 402 mL (01/10/19 1226)  Post Void Cath Residual (mL): 380 mL (01/10/19 1125)    Drains     Drain                 Urethral Catheter 01/10/19 1255 16 Fr. 2 days                Physical Exam   Nursing note and vitals reviewed.  Constitutional: He is oriented to person, place, and time. He appears well-developed and well-nourished.   HENT:   Head: Normocephalic.   Eyes:  Conjunctivae are normal.   Neck: Normal range of motion. Neck supple. No tracheal deviation present. No thyromegaly present.   Cardiovascular: Normal rate and normal heart sounds.    Pulmonary/Chest: Effort normal and breath sounds normal. No respiratory distress. He has no wheezes.   Abdominal: Soft. Bowel sounds are normal. There is no hepatosplenomegaly. There is no tenderness. There is no rebound and no CVA tenderness. No hernia.   Genitourinary:   Genitourinary Comments: Arias with yellow urine   Musculoskeletal: Normal range of motion. He exhibits no edema or tenderness.   Lymphadenopathy:     He has no cervical adenopathy.   Neurological: He is alert and oriented to person, place, and time.   Skin: Skin is warm and dry. No rash noted. No erythema.     Psychiatric: He has a normal mood and affect. His behavior is normal. Judgment and thought content normal.       Significant Labs:    BMP:  Recent Labs   Lab 01/11/19  0354 01/12/19  0530 01/13/19  0505    135* 135*   K 4.1 3.9 4.0    101 101   CO2 23 25 25   BUN 18 19 19   CREATININE 2.2* 2.1* 2.1*   CALCIUM 9.0 9.1 9.3       CBC:   Recent Labs   Lab 01/11/19  0354 01/12/19  0530 01/13/19  0505   WBC 6.33 7.80 7.27   HGB 12.6* 12.5* 12.5*   HCT 38.3* 37.9* 36.9*    300 294       Blood Culture: No results for input(s): LABBLOO in the last 168 hours.  Urine Culture: No results for input(s): LABURIN in the last 168 hours.    Significant Imaging:  U/S: I have reviewed all results within the past 24 hours and my personal findings are:  mild/moderate hydronephrosis                  Assessment/Plan:     * MIAN (acute kidney injury)    Cr plateau, hydro unchanged  Plan for cystoscopy with retrograde pyelogram, possible stent 1/14/2019     Gross hematuria    Cystoscopy with retrograde pyelogram Monday 1/14/2019  Follow culture  Likely related to Arias     Bilateral hydronephrosis     - Concern for obstruction from prostate and/or LAD   - Place arias  (done in ER)   - Check JETT today, as long as hydro is not worse then he can go home today from a  perspective.  Follow up next week for further evaluation and VOT       Elevated PSA     - Highly concerning for prostate cancer  Plan for prostate biopsy on Monday 1/14/2019  Start Cipro today  Hold heparin in AM  Enema in AM     Enlarged prostate with urinary obstruction     - Escalante catheter, home with Escalante   - Flomax         VTE Risk Mitigation (From admission, onward)        Ordered     heparin (porcine) injection 5,000 Units  Every 8 hours      01/10/19 1716     IP VTE HIGH RISK PATIENT  Once      01/10/19 1716          W Eric Bardales MD  Urology  [facility]

## 2019-01-13 NOTE — SUBJECTIVE & OBJECTIVE
Interval History: He is tolerating his Escalante.  He feels well today.  No pain.     Review of Systems   Constitutional: Negative.    HENT: Negative.    Eyes: Negative.    Respiratory: Negative for cough, chest tightness and shortness of breath.    Cardiovascular: Negative for chest pain.   Gastrointestinal: Negative.  Negative for constipation, diarrhea and nausea.   Genitourinary: Positive for difficulty urinating. Negative for hematuria.   Musculoskeletal: Negative.    Neurological: Negative.    Psychiatric/Behavioral: Negative.      Objective:     Temp:  [98 °F (36.7 °C)-98.9 °F (37.2 °C)] 98.4 °F (36.9 °C)  Pulse:  [78-87] 78  Resp:  [18] 18  SpO2:  [93 %-95 %] 95 %  BP: (121-165)/(61-83) 121/61     Body mass index is 26.93 kg/m².       Bladder Scan Volume (mL): 402 mL (01/10/19 1226)  Post Void Cath Residual (mL): 380 mL (01/10/19 1125)    Drains     Drain                 Urethral Catheter 01/10/19 1255 16 Fr. 2 days                Physical Exam   Nursing note and vitals reviewed.  Constitutional: He is oriented to person, place, and time. He appears well-developed and well-nourished.   HENT:   Head: Normocephalic.   Eyes: Conjunctivae are normal.   Neck: Normal range of motion. Neck supple. No tracheal deviation present. No thyromegaly present.   Cardiovascular: Normal rate and normal heart sounds.    Pulmonary/Chest: Effort normal and breath sounds normal. No respiratory distress. He has no wheezes.   Abdominal: Soft. Bowel sounds are normal. There is no hepatosplenomegaly. There is no tenderness. There is no rebound and no CVA tenderness. No hernia.   Genitourinary:   Genitourinary Comments: Escalante with yellow urine   Musculoskeletal: Normal range of motion. He exhibits no edema or tenderness.   Lymphadenopathy:     He has no cervical adenopathy.   Neurological: He is alert and oriented to person, place, and time.   Skin: Skin is warm and dry. No rash noted. No erythema.     Psychiatric: He has a normal mood  and affect. His behavior is normal. Judgment and thought content normal.       Significant Labs:    BMP:  Recent Labs   Lab 01/11/19  0354 01/12/19  0530 01/13/19  0505    135* 135*   K 4.1 3.9 4.0    101 101   CO2 23 25 25   BUN 18 19 19   CREATININE 2.2* 2.1* 2.1*   CALCIUM 9.0 9.1 9.3       CBC:   Recent Labs   Lab 01/11/19  0354 01/12/19  0530 01/13/19  0505   WBC 6.33 7.80 7.27   HGB 12.6* 12.5* 12.5*   HCT 38.3* 37.9* 36.9*    300 294       Blood Culture: No results for input(s): LABBLOO in the last 168 hours.  Urine Culture: No results for input(s): LABURIN in the last 168 hours.    Significant Imaging:  U/S: I have reviewed all results within the past 24 hours and my personal findings are:  mild/moderate hydronephrosis

## 2019-01-13 NOTE — PLAN OF CARE
Problem: Adult Inpatient Plan of Care  Goal: Plan of Care Review  Outcome: Ongoing (interventions implemented as appropriate)   01/13/19 1965   Plan of Care Review   Plan of Care Reviewed With patient;spouse       Comments: Plan of care reviewed with patient and spouse whom remains at bedside with patient. Tele monitor in place. Patient tolerating diet. Denies pain thru out shift. Patient vssaf. He ambulates to restroom on own. Iv fluids infusing as ordered. Escalante draining clear yellow urine. He is hopeful to discharge home after scop tomorrow. Is aware of npo status at midnight. Bed in low locked position, call light in reach. Will continue to monitor for safety.

## 2019-01-13 NOTE — PLAN OF CARE
Problem: Adult Inpatient Plan of Care  Goal: Plan of Care Review  Outcome: Ongoing (interventions implemented as appropriate)  Pt AAOX4 and able to make needs known. VSSAF. Medications reviewed w/ pt. Escalante Care provided.Escalante patent w/ no blood clots present.    Pt turn and repositions independently w/ pillow support. No s/s of distress noted.Pt remains on Tele [id number] w/ NSR.Safety measures continued. Call bell placed  within reach.Will continue to monitor.

## 2019-01-13 NOTE — ASSESSMENT & PLAN NOTE
- Highly concerning for prostate cancer  Plan for prostate biopsy on Monday 1/14/2019  Start Cipro today  Hold heparin in AM  Enema in AM

## 2019-01-14 ENCOUNTER — ANESTHESIA (OUTPATIENT)
Dept: SURGERY | Facility: HOSPITAL | Age: 79
DRG: 660 | End: 2019-01-14
Payer: MEDICARE

## 2019-01-14 ENCOUNTER — ANESTHESIA EVENT (OUTPATIENT)
Dept: SURGERY | Facility: HOSPITAL | Age: 79
DRG: 660 | End: 2019-01-14
Payer: MEDICARE

## 2019-01-14 LAB
ANION GAP SERPL CALC-SCNC: 13 MMOL/L
BASOPHILS # BLD AUTO: 0.02 K/UL
BASOPHILS NFR BLD: 0.3 %
BUN SERPL-MCNC: 21 MG/DL
CALCIUM SERPL-MCNC: 8.8 MG/DL
CHLORIDE SERPL-SCNC: 101 MMOL/L
CO2 SERPL-SCNC: 22 MMOL/L
CREAT SERPL-MCNC: 2.1 MG/DL
DIFFERENTIAL METHOD: ABNORMAL
EOSINOPHIL # BLD AUTO: 0.3 K/UL
EOSINOPHIL NFR BLD: 5.3 %
ERYTHROCYTE [DISTWIDTH] IN BLOOD BY AUTOMATED COUNT: 14.7 %
EST. GFR  (AFRICAN AMERICAN): 34 ML/MIN/1.73 M^2
EST. GFR  (NON AFRICAN AMERICAN): 29 ML/MIN/1.73 M^2
GLUCOSE SERPL-MCNC: 92 MG/DL
HCT VFR BLD AUTO: 36.3 %
HGB BLD-MCNC: 12.1 G/DL
LYMPHOCYTES # BLD AUTO: 0.7 K/UL
LYMPHOCYTES NFR BLD: 11.6 %
MCH RBC QN AUTO: 29.4 PG
MCHC RBC AUTO-ENTMCNC: 33.3 G/DL
MCV RBC AUTO: 88 FL
MONOCYTES # BLD AUTO: 0.7 K/UL
MONOCYTES NFR BLD: 10.4 %
NEUTROPHILS # BLD AUTO: 4.5 K/UL
NEUTROPHILS NFR BLD: 72.4 %
PLATELET # BLD AUTO: 299 K/UL
PMV BLD AUTO: 10 FL
POTASSIUM SERPL-SCNC: 3.9 MMOL/L
RBC # BLD AUTO: 4.11 M/UL
SODIUM SERPL-SCNC: 136 MMOL/L
WBC # BLD AUTO: 6.23 K/UL

## 2019-01-14 PROCEDURE — 88305 TISSUE EXAM BY PATHOLOGIST: CPT | Performed by: PATHOLOGY

## 2019-01-14 PROCEDURE — 36415 COLL VENOUS BLD VENIPUNCTURE: CPT

## 2019-01-14 PROCEDURE — 88305 TISSUE SPECIMEN TO PATHOLOGY - SURGERY: ICD-10-PCS | Mod: 26,,, | Performed by: PATHOLOGY

## 2019-01-14 PROCEDURE — 76000 PR  FLUOROSCOPE EXAMINATION: ICD-10-PCS | Mod: 26,59,, | Performed by: UROLOGY

## 2019-01-14 PROCEDURE — 11000001 HC ACUTE MED/SURG PRIVATE ROOM

## 2019-01-14 PROCEDURE — 52332 PR CYSTOSCOPY,INSERT URETERAL STENT: ICD-10-PCS | Mod: 50,,, | Performed by: UROLOGY

## 2019-01-14 PROCEDURE — 63600175 PHARM REV CODE 636 W HCPCS: Performed by: UROLOGY

## 2019-01-14 PROCEDURE — 25500020 PHARM REV CODE 255: Performed by: UROLOGY

## 2019-01-14 PROCEDURE — C1769 GUIDE WIRE: HCPCS | Performed by: UROLOGY

## 2019-01-14 PROCEDURE — 52332 CYSTOSCOPY AND TREATMENT: CPT | Mod: 50,,, | Performed by: UROLOGY

## 2019-01-14 PROCEDURE — 76872 US TRANSRECTAL: CPT | Mod: 26,,, | Performed by: UROLOGY

## 2019-01-14 PROCEDURE — C2617 STENT, NON-COR, TEM W/O DEL: HCPCS | Performed by: UROLOGY

## 2019-01-14 PROCEDURE — 63600175 PHARM REV CODE 636 W HCPCS: Performed by: NURSE ANESTHETIST, CERTIFIED REGISTERED

## 2019-01-14 PROCEDURE — D9220A PRA ANESTHESIA: ICD-10-PCS | Mod: ANES,,, | Performed by: ANESTHESIOLOGY

## 2019-01-14 PROCEDURE — 25000003 PHARM REV CODE 250: Performed by: NURSE ANESTHETIST, CERTIFIED REGISTERED

## 2019-01-14 PROCEDURE — 71000039 HC RECOVERY, EACH ADD'L HOUR: Performed by: UROLOGY

## 2019-01-14 PROCEDURE — 36000707: Performed by: UROLOGY

## 2019-01-14 PROCEDURE — 85025 COMPLETE CBC W/AUTO DIFF WBC: CPT

## 2019-01-14 PROCEDURE — 99499 UNLISTED E&M SERVICE: CPT | Mod: ,,, | Performed by: UROLOGY

## 2019-01-14 PROCEDURE — 76000 FLUOROSCOPY <1 HR PHYS/QHP: CPT | Mod: 26,59,, | Performed by: UROLOGY

## 2019-01-14 PROCEDURE — D9220A PRA ANESTHESIA: Mod: CRNA,,, | Performed by: NURSE ANESTHETIST, CERTIFIED REGISTERED

## 2019-01-14 PROCEDURE — D9220A PRA ANESTHESIA: ICD-10-PCS | Mod: CRNA,,, | Performed by: NURSE ANESTHETIST, CERTIFIED REGISTERED

## 2019-01-14 PROCEDURE — D9220A PRA ANESTHESIA: Mod: ANES,,, | Performed by: ANESTHESIOLOGY

## 2019-01-14 PROCEDURE — 76872 PR US TRANSRECTAL: ICD-10-PCS | Mod: 26,,, | Performed by: UROLOGY

## 2019-01-14 PROCEDURE — 37000008 HC ANESTHESIA 1ST 15 MINUTES: Performed by: UROLOGY

## 2019-01-14 PROCEDURE — 88305 TISSUE EXAM BY PATHOLOGIST: CPT | Mod: 26,,, | Performed by: PATHOLOGY

## 2019-01-14 PROCEDURE — 74420 PR  X-RAY RETROGRADE PYELOGRAM: ICD-10-PCS | Mod: 26,,, | Performed by: UROLOGY

## 2019-01-14 PROCEDURE — 76942 ECHO GUIDE FOR BIOPSY: CPT | Mod: 26,59,, | Performed by: UROLOGY

## 2019-01-14 PROCEDURE — 55700 PR BIOPSY OF PROSTATE,NEEDLE/PUNCH: ICD-10-PCS | Mod: 51,,, | Performed by: UROLOGY

## 2019-01-14 PROCEDURE — 74420 UROGRAPHY RTRGR +-KUB: CPT | Mod: 26,,, | Performed by: UROLOGY

## 2019-01-14 PROCEDURE — 55700 PR BIOPSY OF PROSTATE,NEEDLE/PUNCH: CPT | Mod: 51,,, | Performed by: UROLOGY

## 2019-01-14 PROCEDURE — 80048 BASIC METABOLIC PNL TOTAL CA: CPT

## 2019-01-14 PROCEDURE — 37000009 HC ANESTHESIA EA ADD 15 MINS: Performed by: UROLOGY

## 2019-01-14 PROCEDURE — 36000706: Performed by: UROLOGY

## 2019-01-14 PROCEDURE — 71000033 HC RECOVERY, INTIAL HOUR: Performed by: UROLOGY

## 2019-01-14 PROCEDURE — 25000003 PHARM REV CODE 250: Performed by: UROLOGY

## 2019-01-14 PROCEDURE — 25000003 PHARM REV CODE 250: Performed by: EMERGENCY MEDICINE

## 2019-01-14 PROCEDURE — 76942 PR U/S GUIDANCE FOR NEEDLE GUIDANCE: ICD-10-PCS | Mod: 26,59,, | Performed by: UROLOGY

## 2019-01-14 PROCEDURE — 99499 NO LOS: ICD-10-PCS | Mod: ,,, | Performed by: UROLOGY

## 2019-01-14 DEVICE — STENT URET PERCUFLEX 6FR 26CM: Type: IMPLANTABLE DEVICE | Site: URETER | Status: FUNCTIONAL

## 2019-01-14 DEVICE — STENT PERCUFLEX PLUS 4.8F 26CM: Type: IMPLANTABLE DEVICE | Site: URETER | Status: FUNCTIONAL

## 2019-01-14 RX ORDER — SODIUM CHLORIDE, SODIUM LACTATE, POTASSIUM CHLORIDE, CALCIUM CHLORIDE 600; 310; 30; 20 MG/100ML; MG/100ML; MG/100ML; MG/100ML
INJECTION, SOLUTION INTRAVENOUS CONTINUOUS PRN
Status: DISCONTINUED | OUTPATIENT
Start: 2019-01-14 | End: 2019-01-14

## 2019-01-14 RX ORDER — METOCLOPRAMIDE HYDROCHLORIDE 5 MG/ML
INJECTION INTRAMUSCULAR; INTRAVENOUS
Status: DISCONTINUED | OUTPATIENT
Start: 2019-01-14 | End: 2019-01-14

## 2019-01-14 RX ORDER — PROPOFOL 10 MG/ML
VIAL (ML) INTRAVENOUS
Status: DISCONTINUED | OUTPATIENT
Start: 2019-01-14 | End: 2019-01-14

## 2019-01-14 RX ORDER — OXYBUTYNIN CHLORIDE 5 MG/1
5 TABLET ORAL 3 TIMES DAILY PRN
Status: DISCONTINUED | OUTPATIENT
Start: 2019-01-14 | End: 2019-01-15 | Stop reason: HOSPADM

## 2019-01-14 RX ORDER — GLYCOPYRROLATE 0.2 MG/ML
INJECTION INTRAMUSCULAR; INTRAVENOUS
Status: DISCONTINUED | OUTPATIENT
Start: 2019-01-14 | End: 2019-01-14

## 2019-01-14 RX ORDER — SODIUM CHLORIDE 0.9 % (FLUSH) 0.9 %
3 SYRINGE (ML) INJECTION
Status: DISCONTINUED | OUTPATIENT
Start: 2019-01-14 | End: 2019-01-14 | Stop reason: HOSPADM

## 2019-01-14 RX ORDER — ACETAMINOPHEN 10 MG/ML
INJECTION, SOLUTION INTRAVENOUS
Status: DISCONTINUED | OUTPATIENT
Start: 2019-01-14 | End: 2019-01-14

## 2019-01-14 RX ORDER — ONDANSETRON 2 MG/ML
INJECTION INTRAMUSCULAR; INTRAVENOUS
Status: DISCONTINUED | OUTPATIENT
Start: 2019-01-14 | End: 2019-01-14

## 2019-01-14 RX ORDER — FENTANYL CITRATE 50 UG/ML
INJECTION, SOLUTION INTRAMUSCULAR; INTRAVENOUS
Status: DISCONTINUED | OUTPATIENT
Start: 2019-01-14 | End: 2019-01-14

## 2019-01-14 RX ORDER — HYDROMORPHONE HYDROCHLORIDE 2 MG/ML
0.2 INJECTION, SOLUTION INTRAMUSCULAR; INTRAVENOUS; SUBCUTANEOUS EVERY 5 MIN PRN
Status: DISCONTINUED | OUTPATIENT
Start: 2019-01-14 | End: 2019-01-14 | Stop reason: HOSPADM

## 2019-01-14 RX ORDER — FENTANYL CITRATE 50 UG/ML
25 INJECTION, SOLUTION INTRAMUSCULAR; INTRAVENOUS EVERY 5 MIN PRN
Status: DISCONTINUED | OUTPATIENT
Start: 2019-01-14 | End: 2019-01-14 | Stop reason: HOSPADM

## 2019-01-14 RX ORDER — PHENYLEPHRINE HYDROCHLORIDE 10 MG/ML
INJECTION INTRAVENOUS
Status: DISCONTINUED | OUTPATIENT
Start: 2019-01-14 | End: 2019-01-14

## 2019-01-14 RX ORDER — LIDOCAINE HCL/PF 100 MG/5ML
SYRINGE (ML) INTRAVENOUS
Status: DISCONTINUED | OUTPATIENT
Start: 2019-01-14 | End: 2019-01-14

## 2019-01-14 RX ORDER — HYDROCODONE BITARTRATE AND ACETAMINOPHEN 5; 325 MG/1; MG/1
1 TABLET ORAL EVERY 4 HOURS PRN
Status: DISCONTINUED | OUTPATIENT
Start: 2019-01-14 | End: 2019-01-15 | Stop reason: HOSPADM

## 2019-01-14 RX ADMIN — SODIUM PHOSPHATE, DIBASIC AND SODIUM PHOSPHATE, MONOBASIC 1 ENEMA: 7; 19 ENEMA RECTAL at 05:01

## 2019-01-14 RX ADMIN — PROPOFOL 20 MG: 10 INJECTION, EMULSION INTRAVENOUS at 12:01

## 2019-01-14 RX ADMIN — CIPROFLOXACIN 400 MG: 2 INJECTION, SOLUTION INTRAVENOUS at 11:01

## 2019-01-14 RX ADMIN — PHENYLEPHRINE HYDROCHLORIDE 200 MCG: 10 INJECTION INTRAVENOUS at 12:01

## 2019-01-14 RX ADMIN — GLYCOPYRROLATE 0.2 MG: 0.2 INJECTION, SOLUTION INTRAMUSCULAR; INTRAVENOUS at 11:01

## 2019-01-14 RX ADMIN — HYDROCODONE BITARTRATE AND ACETAMINOPHEN 1 TABLET: 5; 325 TABLET ORAL at 10:01

## 2019-01-14 RX ADMIN — TAMSULOSIN HYDROCHLORIDE 0.4 MG: 0.4 CAPSULE ORAL at 02:01

## 2019-01-14 RX ADMIN — LIDOCAINE HYDROCHLORIDE 100 MG: 20 INJECTION, SOLUTION INTRAVENOUS at 12:01

## 2019-01-14 RX ADMIN — PROPOFOL 150 MG: 10 INJECTION, EMULSION INTRAVENOUS at 12:01

## 2019-01-14 RX ADMIN — METOCLOPRAMIDE 10 MG: 5 INJECTION, SOLUTION INTRAMUSCULAR; INTRAVENOUS at 11:01

## 2019-01-14 RX ADMIN — FENTANYL CITRATE 50 MCG: 50 INJECTION INTRAMUSCULAR; INTRAVENOUS at 12:01

## 2019-01-14 RX ADMIN — ACETAMINOPHEN 1000 MG: 10 INJECTION, SOLUTION INTRAVENOUS at 12:01

## 2019-01-14 RX ADMIN — FENTANYL CITRATE 25 MCG: 50 INJECTION INTRAMUSCULAR; INTRAVENOUS at 12:01

## 2019-01-14 RX ADMIN — PROPOFOL 30 MG: 10 INJECTION, EMULSION INTRAVENOUS at 12:01

## 2019-01-14 RX ADMIN — CIPROFLOXACIN 400 MG: 2 INJECTION, SOLUTION INTRAVENOUS at 10:01

## 2019-01-14 RX ADMIN — HYDROCODONE BITARTRATE AND ACETAMINOPHEN 1 TABLET: 5; 325 TABLET ORAL at 03:01

## 2019-01-14 RX ADMIN — SODIUM CHLORIDE, SODIUM LACTATE, POTASSIUM CHLORIDE, AND CALCIUM CHLORIDE: .6; .31; .03; .02 INJECTION, SOLUTION INTRAVENOUS at 11:01

## 2019-01-14 RX ADMIN — ONDANSETRON 4 MG: 2 INJECTION, SOLUTION INTRAMUSCULAR; INTRAVENOUS at 12:01

## 2019-01-14 NOTE — PROGRESS NOTES
[facility]  Urology  Progress Note    Patient Name: Obed Sood  MRN: [id number]  Admission Date: 1/10/2019  Hospital Length of Stay: 4 days  Code Status: Full Code   Attending Provider: Enrique Barker MD   Primary Care Physician: Enrique Barker MD    Subjective:     HPI:  77yo M with recent ARF and LE edema. PSA last week was 97.2. He has no known prostate cancer issues though does report in 1990s he was recommended for PBx but declined at that time. He notes mild urinary issues. Denies hematuria.     PVR today 400cc. Escalante catheter placed in ER with return of 400cc yellow urine.     CT scan today shows nodular, enlarged prostate with extensive LAD. Mild bilateral hydroureteronephrosis with some tortuosity of ureters. Thickened bladder with moderate distention.    Interval History: Planned for cysto/stent placement/PBx today with Dr Bardales.     Review of Systems   Constitutional: Negative for chills and fever.   HENT: Negative for hearing loss, sore throat and trouble swallowing.    Eyes: Negative.    Respiratory: Negative for cough and shortness of breath.    Cardiovascular: Negative for chest pain.   Gastrointestinal: Negative for abdominal distention, abdominal pain, constipation, diarrhea, nausea and vomiting.   Genitourinary: Positive for difficulty urinating. Negative for frequency and hematuria.   Musculoskeletal: Negative for arthralgias and neck pain.        Edema   Skin: Negative for color change, pallor and rash.   Neurological: Negative for dizziness and seizures.   Hematological: Negative for adenopathy.   Psychiatric/Behavioral: Negative for confusion.   All other systems reviewed and are negative.    Objective:     Temp:  [97 °F (36.1 °C)-98.4 °F (36.9 °C)] 97 °F (36.1 °C)  Pulse:  [75-88] 75  Resp:  [18] 18  SpO2:  [93 %-97 %] 95 %  BP: (118-147)/(60-69) 147/65     Body mass index is 26.93 kg/m².       Bladder Scan Volume (mL): 402 mL (01/10/19 1226)  Post Void Cath  Residual (mL): 380 mL (01/10/19 1125)    Drains     Drain                 Urethral Catheter 01/10/19 1255 16 Fr. 3 days                Physical Exam   Vitals reviewed.  Constitutional: He is oriented to person, place, and time. He appears well-developed and well-nourished. No distress.   HENT:   Head: Normocephalic and atraumatic.   Eyes: Right eye exhibits no discharge. Left eye exhibits no discharge. No scleral icterus.   Neck: Normal range of motion. Neck supple.   Cardiovascular: Normal rate and regular rhythm.    Pulmonary/Chest: Effort normal and breath sounds normal. No respiratory distress.   Abdominal: Soft. Bowel sounds are normal. He exhibits no distension. There is no tenderness. There is no rebound and no guarding.   Genitourinary:   Genitourinary Comments: Escalante - clear yellow urine  MARY (done previously) - rigid prostate, no distinct nodularity. Approx 60g.    Musculoskeletal: Normal range of motion.   Neurological: He is alert and oriented to person, place, and time.   Skin: Skin is warm and dry. He is not diaphoretic. No erythema.         Significant Labs:    BMP:  Recent Labs   Lab 01/12/19  0530 01/13/19  0505 01/14/19  0357   * 135* 136   K 3.9 4.0 3.9    101 101   CO2 25 25 22*   BUN 19 19 21   CREATININE 2.1* 2.1* 2.1*   CALCIUM 9.1 9.3 8.8       CBC:   Recent Labs   Lab 01/12/19  0530 01/13/19  0505 01/14/19  0357   WBC 7.80 7.27 6.23   HGB 12.5* 12.5* 12.1*   HCT 37.9* 36.9* 36.3*    294 299       Blood Culture: No results for input(s): LABBLOO in the last 168 hours.  Urine Culture: No results for input(s): LABURIN in the last 168 hours.  Urine Studies:   Recent Labs   Lab 01/10/19  1125   COLORU Yellow   APPEARANCEUA Clear   PHUR 5.0   SPECGRAV 1.010   PROTEINUA Negative   GLUCUA Negative   KETONESU Negative   BILIRUBINUA Negative   OCCULTUA 2+*   NITRITE Negative   UROBILINOGEN Negative   LEUKOCYTESUR Trace*   RBCUA 3   WBCUA 4   BACTERIA Rare   SQUAMEPITHEL 1        Significant Imaging:  All pertinent imaging results/findings from the past 24 hours have been reviewed.                  Assessment/Plan:     * MIAN (acute kidney injury)    Cr plateau, hydro unchanged  Plan for cystoscopy with retrograde pyelogram, possible stent 1/14/2019     Gross hematuria    Cystoscopy with retrograde pyelogram Monday 1/14/2019  Follow culture  Likely related to Arias     Bilateral hydronephrosis     - Concern for obstruction from prostate and/or LAD   - Place arias (done in ER)   - Not improved on JETT after arias placement   - OR today for cystoscopy possible stents   - Follow up next week for further evaluation and VOT       Elevated PSA     - Highly concerning for prostate cancer  Plan for prostate biopsy on Monday 1/14/2019  Cipro x 3 days  Hold heparin  Enema in AM     Enlarged prostate with urinary obstruction     - Arias catheter, home with Arias   - Flomax         VTE Risk Mitigation (From admission, onward)        Ordered     IP VTE HIGH RISK PATIENT  Once      01/10/19 0701          Myriam Spivey MD  Urology  [facility]

## 2019-01-14 NOTE — ASSESSMENT & PLAN NOTE
- Highly concerning for prostate cancer  Plan for prostate biopsy on Monday 1/14/2019  Cipro x 3 days  Hold heparin  Enema in AM

## 2019-01-14 NOTE — ASSESSMENT & PLAN NOTE
- Concern for obstruction from prostate and/or LAD   - Place arias (done in ER)   - Not improved on JETT after arias placement   - OR today for cystoscopy possible stents   - Follow up next week for further evaluation and VOT

## 2019-01-14 NOTE — ANESTHESIA PREPROCEDURE EVALUATION
01/14/2019  Obed Sood is a 78 y.o., male.    Anesthesia Evaluation     I have reviewed the Nursing Notes.      Review of Systems  Anesthesia Hx:  No problems with previous Anesthesia   Social:  Former Smoker    Cardiovascular:   Exercise tolerance: poor Denies Pacemaker. Hypertension  Denies Valvular problems/Murmurs.  Denies MI.  Denies CAD.    Denies CABG/stent.  Denies Dysrhythmias.      hyperlipidemia Echo 11/2019:  WNL  Lower extremity edema, at baseline    Pt doesn't have much exercise tolerance secondary to leg swelling   Pulmonary:  Pulmonary Normal    Renal/:   BPH hydronephrosis   Hepatic/GI:  Hepatic/GI Normal    Neurological:  Neurology Normal    Endocrine:  Endocrine Normal        Physical Exam  General:  Well nourished    Airway/Jaw/Neck:  AIRWAY FINDINGS: Normal      Chest/Lungs:  Chest/Lungs Clear    Heart/Vascular:  Heart Findings: Normal       Mental Status:  Mental Status Findings: Normal        Anesthesia Plan  Type of Anesthesia, risks & benefits discussed:  Anesthesia Type:  general  Patient's Preference:   Intra-op Monitoring Plan: standard ASA monitors  Intra-op Monitoring Plan Comments:   Post Op Pain Control Plan:   Post Op Pain Control Plan Comments:   Induction:   IV  Beta Blocker:  Patient is not currently on a Beta-Blocker (No further documentation required).       Informed Consent: Patient understands risks and agrees with Anesthesia plan.  Questions answered. Anesthesia consent signed with patient.  ASA Score: 3     Day of Surgery Review of History & Physical:  There are no significant changes.  H&P update referred to the provider.         Ready For Surgery From Anesthesia Perspective.

## 2019-01-14 NOTE — PLAN OF CARE
Problem: Adult Inpatient Plan of Care  Goal: Plan of Care Review  Outcome: Ongoing (interventions implemented as appropriate)  Pt AAOX4 and able to make needs known. VSSAF.Pt NPO after midnight.IV ABXs administered as ordered. Medications reviewed w/ pt. Escalante Care provided.Escalante patent w/ no blood clots present. Pt turn and repositions independently w/ pillow support. No s/s of distress noted.Pt remains on Tele [id number] w/ NSR.Safety measures continued. Call bell placed  within reach.Will continue to monitor.

## 2019-01-14 NOTE — SUBJECTIVE & OBJECTIVE
Interval History: Planned for cysto/stent placement/PBx today with Dr Bardales.     Review of Systems   Constitutional: Negative for chills and fever.   HENT: Negative for hearing loss, sore throat and trouble swallowing.    Eyes: Negative.    Respiratory: Negative for cough and shortness of breath.    Cardiovascular: Negative for chest pain.   Gastrointestinal: Negative for abdominal distention, abdominal pain, constipation, diarrhea, nausea and vomiting.   Genitourinary: Positive for difficulty urinating. Negative for frequency and hematuria.   Musculoskeletal: Negative for arthralgias and neck pain.        Edema   Skin: Negative for color change, pallor and rash.   Neurological: Negative for dizziness and seizures.   Hematological: Negative for adenopathy.   Psychiatric/Behavioral: Negative for confusion.   All other systems reviewed and are negative.    Objective:     Temp:  [97 °F (36.1 °C)-98.4 °F (36.9 °C)] 97 °F (36.1 °C)  Pulse:  [75-88] 75  Resp:  [18] 18  SpO2:  [93 %-97 %] 95 %  BP: (118-147)/(60-69) 147/65     Body mass index is 26.93 kg/m².       Bladder Scan Volume (mL): 402 mL (01/10/19 1226)  Post Void Cath Residual (mL): 380 mL (01/10/19 1125)    Drains     Drain                 Urethral Catheter 01/10/19 1255 16 Fr. 3 days                Physical Exam   Vitals reviewed.  Constitutional: He is oriented to person, place, and time. He appears well-developed and well-nourished. No distress.   HENT:   Head: Normocephalic and atraumatic.   Eyes: Right eye exhibits no discharge. Left eye exhibits no discharge. No scleral icterus.   Neck: Normal range of motion. Neck supple.   Cardiovascular: Normal rate and regular rhythm.    Pulmonary/Chest: Effort normal and breath sounds normal. No respiratory distress.   Abdominal: Soft. Bowel sounds are normal. He exhibits no distension. There is no tenderness. There is no rebound and no guarding.   Genitourinary:   Genitourinary Comments: Escalante - clear yellow  urine  MARY (done previously) - rigid prostate, no distinct nodularity. Approx 60g.    Musculoskeletal: Normal range of motion.   Neurological: He is alert and oriented to person, place, and time.   Skin: Skin is warm and dry. He is not diaphoretic. No erythema.         Significant Labs:    BMP:  Recent Labs   Lab 01/12/19  0530 01/13/19  0505 01/14/19  0357   * 135* 136   K 3.9 4.0 3.9    101 101   CO2 25 25 22*   BUN 19 19 21   CREATININE 2.1* 2.1* 2.1*   CALCIUM 9.1 9.3 8.8       CBC:   Recent Labs   Lab 01/12/19  0530 01/13/19  0505 01/14/19  0357   WBC 7.80 7.27 6.23   HGB 12.5* 12.5* 12.1*   HCT 37.9* 36.9* 36.3*    294 299       Blood Culture: No results for input(s): LABBLOO in the last 168 hours.  Urine Culture: No results for input(s): LABURIN in the last 168 hours.  Urine Studies:   Recent Labs   Lab 01/10/19  1125   COLORU Yellow   APPEARANCEUA Clear   PHUR 5.0   SPECGRAV 1.010   PROTEINUA Negative   GLUCUA Negative   KETONESU Negative   BILIRUBINUA Negative   OCCULTUA 2+*   NITRITE Negative   UROBILINOGEN Negative   LEUKOCYTESUR Trace*   RBCUA 3   WBCUA 4   BACTERIA Rare   SQUAMEPITHEL 1       Significant Imaging:  All pertinent imaging results/findings from the past 24 hours have been reviewed.

## 2019-01-14 NOTE — BRIEF OP NOTE
[facility]  Brief Operative Note    SUMMARY     Surgery Date: 1/14/2019     Surgeon(s) and Role:     * ROSINA Bardales MD - Primary    Assisting Surgeon: None    Pre-op Diagnosis:  Hydronephrosis [N13.30]    Post-op Diagnosis:  Post-Op Diagnosis Codes:     * Hydronephrosis [N13.30]    Procedure(s) (LRB):  CYSTOSCOPY, WITH RETROGRADE PYELOGRAM AND URETERAL STENT INSERTION (Bilateral)  BIOPSY, PROSTATE (N/A)    Anesthesia: Choice    Description of Procedure: 85 gm prostate, 12 core biopsy, bilateral stents    Description of the findings of the procedure: bilateral tortuous ureters with fixed prostate and mid ureteral narrowing, 4.8 Fr stent right 6 Fr stent left 26 cm, 20 Fr Douglas tip Escalante    Estimated Blood Loss: * No values recorded between 1/14/2019 12:15 PM and 1/14/2019 12:55 PM *         Specimens:   Specimen (12h ago, onward)    Start     Ordered    01/14/19 1244  Specimen to Pathology - Surgery  Once     Comments:  Left prostate Bx Base LateralLeft Prostate Bx Base MedialLeft Prostate Bx Mid MedialLeft Prostate Bx Mid LateralLeft Prostate Bx Wing MedialLeft Prostate Bx Wing LateralRight Prostate Bx Base MedialRight Prostate Bx Base LateralRight Prostate Bx Mid MedialRight Prostate Bx Mid LateralRight Prostate Bx Wing LateralRight Prostate Bx Wing Medial     Start Status   01/14/19 1244 Collected (01/14/19 1247)       01/14/19 1247    Pending  Specimen to Pathology - Surgery  Once     Comments:  Right prostate bx -base lateralRight prostate bx-base medialRight prostate bx-mid lateralRight prostate bx-mid medialRight prostate bx-apex lateralRight prostate  bx-apex medial      Pending    Pending  Specimen to Pathology - Surgery  Once     Comments:  Left prostate bx-base lateralLeft prostate bx-base medialLeft prostate bx-mid lateralLeft prostate bx-mid medialLeft prostate bx-apex lateralLeft prostate bx apex medial      Pending

## 2019-01-14 NOTE — PROGRESS NOTES
[facility]  Hematology/Oncology  Progress Note    Patient Name: Obed Sood  Admission Date: 1/10/2019  Hospital Length of Stay: 4 days  Code Status: Full Code     Subjective:     Mr. Sood is a pleasant 79 YO gentleman with BPH who began to have fabien leg edema in Nov 2018. Pt was treated conservatively and underwent out pt work up. Pt began to have RLE non pitting edema and labs revealed MIAN. Pt was given IV fluid out pt and further labs obtained. PSA was found to be elevated. CT ab pelvis today revealed moderate bilateral hydronephrosis, conglomerated retroperitoneal adenopathy present surrounding the abdominal aorta and lower inferior vena cava with loss of the normal fat, Iliac chain adenopathy with bilobed soft tissue density nodule to the right of the urinary bladder anteriorly measuring 2.8 x 1.5 cm in size as well as enlarged prostate gland with a nodular projection posteriorly on the left.      Few weeks ago, he began to have lower back pain and started taking naprosyn OTC. No recent wt changes. Had difficulty ambulating due to leg edema, R> L.     Interval History:     01/14/19: Awaiting to have cystoscopy today. Denies fever or chills.   01/13/19: scrotal edema stable. Tolerating Escalante. Denies fever or chills. Had US done yesterday.   01/12/19: states pelvic discomfort improving. Tolerating Escalante. Denies fever or chills.   01/11/19: scrotal swelling improving. Denies fever or chills.     Oncology Treatment Plan:   [No treatment plan]    Medications:  Continuous Infusions:  Scheduled Meds:   ciprofloxacin  400 mg Intravenous Q12H    tamsulosin  0.4 mg Oral Daily     PRN Meds:acetaminophen, ondansetron, sodium chloride 0.9%     Review of Systems     Constitutional: Denies any weigh or appetite changes.   Eyes: no visual changes   ENT: no nasal congestion. Denies sore throat with odynophagia   Respiratory: No cough, SOB, exertional dyspnea or  hemoptysis   Cardiovascular: no  chest pain or palpitations   Gastrointestinal: no nausea, vomiting or abdominal pain. Normal bowl habits   Hematologic/Lymphatic:  hematuria resolved   Musculoskeletal: back pain  : scrotal edema   Neurological: no seizures or tremors, gait or balance prblems  Skin: No rashes or lesions  Psych: Denies any anxiety, depression or insomnia    Objective:     Vital Signs (Most Recent):  Temp: 97 °F (36.1 °C) (01/14/19 0734)  Pulse: 75 (01/14/19 0734)  Resp: 18 (01/14/19 0734)  BP: (!) 147/65 (01/14/19 0734)  SpO2: 95 % (01/14/19 0734) Vital Signs (24h Range):  Temp:  [97 °F (36.1 °C)-98.4 °F (36.9 °C)] 97 °F (36.1 °C)  Pulse:  [75-88] 75  Resp:  [18] 18  SpO2:  [93 %-97 %] 95 %  BP: (118-147)/(60-69) 147/65     Weight: 82.7 kg (182 lb 6.1 oz)  Body mass index is 26.93 kg/m².  Body surface area is 2.01 meters squared.      Intake/Output Summary (Last 24 hours) at 1/14/2019 0740  Last data filed at 1/14/2019 0600  Gross per 24 hour   Intake 760 ml   Output 1850 ml   Net -1090 ml       Physical Exam    General: NAD, sitting up in bed. Wife at the bedside   Head: normocephalic, atraumatic   Eyes: conjunctivae pink  Throat: No erythema or post nasal discharge   Neck: supple, no LAD   Lungs: CTAB   Heart: S1, S2, RRR   Abdomen: + BS x 4 quadrants, slightly distended  : Escalante with yellow urine, scrotal edema- marginally better    Extremities: leg edema R>L  Skin: abdominal wall edema- better    MS: No sig ROM diff of extremities  Neuro: A&O x 3  Psy: pleasant, affect is congruent to mood     Significant Labs:   CBC:   Recent Labs   Lab 01/13/19 0505 01/14/19 0357   WBC 7.27 6.23   HGB 12.5* 12.1*   HCT 36.9* 36.3*    299   , CMP:   Recent Labs   Lab 01/13/19  0505 01/14/19  0357   * 136   K 4.0 3.9    101   CO2 25 22*   GLU 91 92   BUN 19 21   CREATININE 2.1* 2.1*   CALCIUM 9.3 8.8   ANIONGAP 9 13   EGFRNONAA 29* 29*   , LDH: No results for input(s): LDHCSF, BFSOURCE in the last 48 hours.,  Reticulocytes: No results for input(s): RETIC in the last 48 hours., Tumor Markers: No results for input(s): PSA, CEA, , AFPTM, HA9338,  in the last 48 hours.    Invalid input(s): ALGTM, Uric Acid No results for input(s): URICACID in the last 48 hours. and Urine Studies:   No results for input(s): COLORU, APPEARANCEUA, PHUR, SPECGRAV, PROTEINUA, GLUCUA, KETONESU, BILIRUBINUA, OCCULTUA, NITRITE, UROBILINOGEN, LEUKOCYTESUR, RBCUA, WBCUA, BACTERIA, SQUAMEPITHEL, HYALINECASTS in the last 48 hours.    Invalid input(s): WRIGHTSUR    Diagnostic Results:      Assessment/Plan:     Active Diagnoses:     Diagnosis Date Noted POA    MIAN (acute kidney injury) [N17.9]  - most likely due to bilateral moderate hydronephrosis and bladder outlet obstruction  - s/p Escalante insertion  - urology consulted  - cr elevated at 2.1 but stable   -   US shows no improvement    - pt need cysto and stent placement- will defer to urology -appreciate input  - awaiting cysto today       01/10/2019 Yes    Elevated PSA [R97.20]- with significant retroperitoneal lymphadenopathy   - highly suspicious of primary prostate Ca  - need cystoscopy and bx  - urology taking him to OR today      01/10/2019 Yes    Bilateral hydronephrosis [N13.30]  - US - no improvement    01/10/2019 Yes    Enlarged prostate with urinary obstruction [N40.1, N13.8]     - on Flomax    01/04/2019 Yes       Problems Resolved During this Admission:      DVT pro: Heparin     Back pain : start Tylenol with codeine     Await final reccs from urology       Ernique Barker M.D  Internal Medicine & Geriatric Medicine  Hematology & Oncology  Palliative Medicine    [address] [facility], [address]  [place], [place] [address] [phone] (Office)  [phone] (Fax)

## 2019-01-14 NOTE — TRANSFER OF CARE
"Anesthesia Transfer of Care Note    Patient: Obed Sood    Procedure(s) Performed: Procedure(s) (LRB):  CYSTOSCOPY, WITH RETROGRADE PYELOGRAM AND URETERAL STENT INSERTION (Bilateral)  BIOPSY, PROSTATE (N/A)    Patient location: PACU    Anesthesia Type: general    Transport from OR: Transported from OR on room air with adequate spontaneous ventilation    Post pain: adequate analgesia    Post assessment: no apparent anesthetic complications and tolerated procedure well    Post vital signs: stable    Level of consciousness: awake, alert and oriented    Nausea/Vomiting: no nausea/vomiting    Complications: none    Transfer of care protocol was followed      Last vitals:   Visit Vitals  BP (!) 177/77 (BP Location: Left arm, Patient Position: Lying)   Pulse 90   Temp 37 °C (98.6 °F) (Oral)   Resp 16   Ht 5' 9" (1.753 m)   Wt 82.7 kg (182 lb 6.1 oz)   SpO2 97%   BMI 26.93 kg/m²     "

## 2019-01-14 NOTE — OP NOTE
DATE OF PROCEDURE:  01/14/2019.    PREOPERATIVE DIAGNOSES:  Elevated PSA, bilateral hydronephrosis and acute kidney   injury.    POSTOPERATIVE DIAGNOSES:  Elevated PSA, bilateral hydronephrosis and acute   kidney injury.    PROCEDURE PERFORMED:  Transrectal ultrasound, prostate volume study, prostate   needle biopsy, cystoscopy with bilateral retrograde pyelogram, bilateral   ureteral stent placement, fluoroscopy and Escalante catheter placement.    PRIMARY SURGEON:  Geovani Bardales M.D.    ANESTHESIA:  General.    ESTIMATED BLOOD LOSS:  Minimal.    DRAINS:  A 6-Turkmen 26 cm double-J stent on the left, 4.8 Turkmen x 26 cm   double-J stent on the right, 20-Turkmen Escalante catheter.    SPECIMENS REMOVED:  Prostate biopsies.    COMPLICATIONS:  None.    INDICATIONS:  Obed Sood is a 78-year-old male with a history of acute   kidney injury with hydronephrosis.  He also was found to have elevated PSA to   95.  His creatinine has plateaued and is still elevated.  Hydronephrosis   remained persistent despite Escalante catheter.  Therefore it was recommended that   he undergo cystoscopy with stent placement due to his elevated PSA and   lymphadenopathy based on CT scan, it was recommended that he undergo prostate   needle biopsy.    Obed Sood was taken to the Operating Room where he was positively   identified by armband.  He was placed supine on the operating room table.    Following induction of adequate general anesthesia, he was placed in the dorsal   lithotomy position.    A 12.4 MHz ultrasound probe was then passed per anus into the rectum.  The   prostate was visualized in sagittal and transverse planes.  The prostate volume   was calculated to be 85 cubic cm.    In sagittal plane with the needle guide, I used a biopsy needle to take a total   of 12 cores from the prostate, 6 from the left and 6 from the right at the base,   mid and apex laterally and medially.  The probe was then withdrawn.  There was   mild  amount of bleeding noted, which stopped spontaneously.    I then prepped and draped his external genitalia.  A repeat timeout was   performed for the cystoscopic portion of the procedure.     films taken using fluoroscopy.    A 22-Romansh rigid cystoscope was then passed per urethra into the bladder under   direct vision.  His prostatic urethra was found to be enlarged and fixed very   difficult to pass the rigid scope through.  Once into the bladder, the bladder   was trabeculated with cellule formation and lateral wall appeared thickened.    There were no tumors seen.    Both ureteral orifices were identified.  They were seen to be in their   orthotopic position.    I used a 5-Romansh open-ended catheter to intubate the right ureteral orifice.    Retrograde pyelogram was taken.  The distal ureter was tortuous.  The contrast   was seen traveling up the ureter where he had multiple areas of narrowing   consistent with extrinsic compression.  He had hydronephrosis.    The open-ended catheter was then used to intubate the left ureteral orifice.    The contrast traveled up the ureter, which also had a significant distal   tortuosity as well as mid ureteral narrowing consistent with extrinsic   compression.  He also had hydronephrosis.    The bladder was drained.  Post-drainage films showed that contrast was retained   proximal to the areas of mid ureteral narrowing consistent with obstruction;   therefore, I decided to proceed with stent placement.  I turned my attention   back to the right side using the open-ended catheter.  I directed a sensor wire   into the ureter would not go beyond the distal tortuosity; therefore, I switched   to hydrophilic zip wire.  This was able to go up to the level of the kidney.    The ureter straightened out.  I exchanged the zip wire for the sensor wire.    I then attempted to pass a 6-Romansh 26 cm stent over the wire and it would not   go beyond the distal ureter.  Therefore, I  switched down to a 4.8 x 26 cm stent.    This passed up to the level of the right kidney and I deployed a coil within   the right kidney and a coil within the bladder, confirmed on fluoroscopy and   visually respectively.    I then used the open-ended catheter to direct a zip wire up the left ureter.    The left ureter was straightened out once the wire was up into the level of the   kidney.  The zip wire was exchanged for the sensor wire.  I then passed a   6-Welsh 26 cm double-J stent over the wire, deploying a coil within the left   kidney and a coil within the bladder, confirmed on fluoroscopy and visually   respectively.    I then repassed the sensor wire into the bladder.  I then withdrew the scope.    I then advanced a 20-Welsh Bancroft tip catheter, which passed through the rigid   prostate over the wire into the bladder confirmed on fluoroscopy.  The catheter   was passed up to the hub and wire was withdrawn, light pink efflux was seen   draining from the catheter.  The catheter balloon was inflated and the catheter   was left to gravity drainage.    His anesthesia was then reversed.  He was taken to the Recovery Room in stable   condition.      KRISTY  dd: 01/14/2019 13:03:21 (CST)  td: 01/14/2019 13:35:35 (CST)  Doc ID   #[id number]  Job ID #[id number]    CC:

## 2019-01-15 ENCOUNTER — TELEPHONE (OUTPATIENT)
Dept: PHARMACY | Facility: CLINIC | Age: 79
End: 2019-01-15

## 2019-01-15 ENCOUNTER — TELEPHONE (OUTPATIENT)
Dept: UROLOGY | Facility: CLINIC | Age: 79
End: 2019-01-15

## 2019-01-15 VITALS
HEART RATE: 83 BPM | RESPIRATION RATE: 18 BRPM | BODY MASS INDEX: 27.01 KG/M2 | WEIGHT: 182.38 LBS | SYSTOLIC BLOOD PRESSURE: 120 MMHG | TEMPERATURE: 98 F | HEIGHT: 69 IN | OXYGEN SATURATION: 95 % | DIASTOLIC BLOOD PRESSURE: 57 MMHG

## 2019-01-15 DIAGNOSIS — D49.59 NEOPLASM OF PROSTATE: Primary | ICD-10-CM

## 2019-01-15 LAB
ANION GAP SERPL CALC-SCNC: 12 MMOL/L
BASOPHILS # BLD AUTO: 0.01 K/UL
BASOPHILS NFR BLD: 0.2 %
BUN SERPL-MCNC: 21 MG/DL
CALCIUM SERPL-MCNC: 8.8 MG/DL
CHLORIDE SERPL-SCNC: 100 MMOL/L
CO2 SERPL-SCNC: 22 MMOL/L
CREAT SERPL-MCNC: 1.9 MG/DL
DIFFERENTIAL METHOD: ABNORMAL
EOSINOPHIL # BLD AUTO: 0.2 K/UL
EOSINOPHIL NFR BLD: 2.8 %
ERYTHROCYTE [DISTWIDTH] IN BLOOD BY AUTOMATED COUNT: 14.2 %
EST. GFR  (AFRICAN AMERICAN): 38 ML/MIN/1.73 M^2
EST. GFR  (NON AFRICAN AMERICAN): 33 ML/MIN/1.73 M^2
GLUCOSE SERPL-MCNC: 96 MG/DL
HCT VFR BLD AUTO: 35 %
HGB BLD-MCNC: 11.8 G/DL
LYMPHOCYTES # BLD AUTO: 0.6 K/UL
LYMPHOCYTES NFR BLD: 8.6 %
MCH RBC QN AUTO: 29.6 PG
MCHC RBC AUTO-ENTMCNC: 33.7 G/DL
MCV RBC AUTO: 88 FL
MONOCYTES # BLD AUTO: 0.6 K/UL
MONOCYTES NFR BLD: 9.8 %
NEUTROPHILS # BLD AUTO: 5.1 K/UL
NEUTROPHILS NFR BLD: 78.6 %
PLATELET # BLD AUTO: 310 K/UL
PMV BLD AUTO: 9.8 FL
POTASSIUM SERPL-SCNC: 4 MMOL/L
RBC # BLD AUTO: 3.98 M/UL
SODIUM SERPL-SCNC: 134 MMOL/L
WBC # BLD AUTO: 6.5 K/UL

## 2019-01-15 PROCEDURE — 85025 COMPLETE CBC W/AUTO DIFF WBC: CPT

## 2019-01-15 PROCEDURE — 25000003 PHARM REV CODE 250: Performed by: EMERGENCY MEDICINE

## 2019-01-15 PROCEDURE — 36415 COLL VENOUS BLD VENIPUNCTURE: CPT

## 2019-01-15 PROCEDURE — 99232 SBSQ HOSP IP/OBS MODERATE 35: CPT | Mod: ,,, | Performed by: UROLOGY

## 2019-01-15 PROCEDURE — 99232 PR SUBSEQUENT HOSPITAL CARE,LEVL II: ICD-10-PCS | Mod: ,,, | Performed by: UROLOGY

## 2019-01-15 PROCEDURE — 63600175 PHARM REV CODE 636 W HCPCS: Performed by: UROLOGY

## 2019-01-15 PROCEDURE — 80048 BASIC METABOLIC PNL TOTAL CA: CPT

## 2019-01-15 PROCEDURE — 25000003 PHARM REV CODE 250: Performed by: UROLOGY

## 2019-01-15 RX ORDER — OXYBUTYNIN CHLORIDE 5 MG/1
5 TABLET ORAL 3 TIMES DAILY
Qty: 90 TABLET | Refills: 2 | Status: SHIPPED | OUTPATIENT
Start: 2019-01-15 | End: 2019-01-28

## 2019-01-15 RX ORDER — BICALUTAMIDE 50 MG/1
50 TABLET, FILM COATED ORAL DAILY
Qty: 30 TABLET | Refills: 11 | Status: SHIPPED | OUTPATIENT
Start: 2019-01-15 | End: 2019-07-19

## 2019-01-15 RX ADMIN — TAMSULOSIN HYDROCHLORIDE 0.4 MG: 0.4 CAPSULE ORAL at 08:01

## 2019-01-15 RX ADMIN — HYDROCODONE BITARTRATE AND ACETAMINOPHEN 1 TABLET: 5; 325 TABLET ORAL at 07:01

## 2019-01-15 RX ADMIN — CIPROFLOXACIN 400 MG: 2 INJECTION, SOLUTION INTRAVENOUS at 11:01

## 2019-01-15 NOTE — PLAN OF CARE
Problem: Fall Injury Risk  Goal: Absence of Fall and Fall-Related Injury  Outcome: Ongoing (interventions implemented as appropriate)  No falls this shift. Safety precautions maintained.

## 2019-01-15 NOTE — PROGRESS NOTES
[facility]    WRITTEN HEALTHCARE AND DISCHARGE INFORMATION    Follow-up Information     W Eric Bardales MD On 1/28/2019.    Specialty:  Urology  Why:  @2:30pm Ms Dmitry NP for urology follow up  Contact information:  [address]  [address]  [place] [address]  [phone]             Enrique Barker MD On 1/18/2019.    Specialties:  Internal Medicine, Oncology, Hematology and Oncology  Why:  @10:55, for Hospital Follow up  Contact information:  [address] [facility]  [address]  [place] [address]  [phone]                     Help at Home           [phone]  After discharge for assistance [facility] On Call Nurse Care Line 24/7  Assistance     Things You are responsible For To Manage Your Care At Home:  1.    Getting your prescriptions filled   2.    Taking your medications as directed, DO NOT MISS ANY DOSES!  3.    Going to your follow-up doctor appointment. This is important because it  allow the doctor to monitor your progress and determine if  any changes need to made to your treatment plan.     Thank you for choosing [facility] for your care.  Please answer any calls you may receive from [facility] we want to continue to support you as you manage your healthcare needs. [facility] is happy to have the opportunity to serve you.      Sincerely,  Your [facility] Team,  ADALI Cazares, ACM-RN; [facility] [[email or web address]]  [phone]

## 2019-01-15 NOTE — SUBJECTIVE & OBJECTIVE
Interval History: s/p bilateral stents and prostate biopsy on 1/14, tolerating stents and Escalante.      Review of Systems   Constitutional: Negative.    HENT: Negative.    Eyes: Negative.    Respiratory: Negative for cough, chest tightness and shortness of breath.    Cardiovascular: Negative for chest pain.   Gastrointestinal: Negative.  Negative for constipation, diarrhea and nausea.   Genitourinary: Positive for hematuria. Negative for flank pain.   Musculoskeletal: Negative.    Neurological: Negative.    Psychiatric/Behavioral: Negative.      Objective:     Temp:  [97.3 °F (36.3 °C)-98.6 °F (37 °C)] 97.3 °F (36.3 °C)  Pulse:  [72-90] 83  Resp:  [8-20] 18  SpO2:  [93 %-98 %] 96 %  BP: (130-178)/(61-81) 137/65     Body mass index is 26.93 kg/m².       Bladder Scan Volume (mL): 402 mL (01/10/19 1226)  Post Void Cath Residual (mL): 380 mL (01/10/19 1125)    Drains     Drain                 Ureteral Drain/Stent 01/14/19 1258 Left ureter 6 Fr. less than 1 day         Ureteral Drain/Stent 01/14/19 1258 Right ureter 4.8 Fr. less than 1 day         Urethral Catheter 01/14/19 1248 Latex 20 Fr. less than 1 day                Physical Exam   Nursing note and vitals reviewed.  Constitutional: He is oriented to person, place, and time. He appears well-developed and well-nourished.   HENT:   Head: Normocephalic.   Eyes: Conjunctivae are normal.   Neck: Normal range of motion. Neck supple. No tracheal deviation present. No thyromegaly present.   Cardiovascular: Normal rate and normal heart sounds.    Pulmonary/Chest: Effort normal and breath sounds normal. No respiratory distress. He has no wheezes.   Abdominal: Soft. Bowel sounds are normal. There is no hepatosplenomegaly. There is no tenderness. There is no rebound and no CVA tenderness. No hernia.   Genitourinary:   Genitourinary Comments: Escalante in place with pink urine   Musculoskeletal: Normal range of motion. He exhibits no edema or tenderness.   Lymphadenopathy:     He has  no cervical adenopathy.   Neurological: He is alert and oriented to person, place, and time.   Skin: Skin is warm and dry. No rash noted. No erythema.     Psychiatric: He has a normal mood and affect. His behavior is normal. Judgment and thought content normal.       Significant Labs:    BMP:  Recent Labs   Lab 01/13/19  0505 01/14/19  0357 01/15/19  0350   * 136 134*   K 4.0 3.9 4.0    101 100   CO2 25 22* 22*   BUN 19 21 21   CREATININE 2.1* 2.1* 1.9*   CALCIUM 9.3 8.8 8.8       CBC:   Recent Labs   Lab 01/13/19  0505 01/14/19  0357 01/15/19  0350   WBC 7.27 6.23 6.50   HGB 12.5* 12.1* 11.8*   HCT 36.9* 36.3* 35.0*    299 310       Blood Culture: No results for input(s): LABBLOO in the last 168 hours.  Urine Culture: No results for input(s): LABURIN in the last 168 hours.    Significant Imaging:

## 2019-01-15 NOTE — PROGRESS NOTES
[facility]  Urology  Progress Note    Patient Name: Obed Sood  MRN: [id number]  Admission Date: 1/10/2019  Hospital Length of Stay: 5 days  Code Status: Full Code   Attending Provider: Enrique Barker MD   Primary Care Physician: Enrique Barker MD    Subjective:     HPI:  79yo M with recent ARF and LE edema. PSA last week was 97.2. He has no known prostate cancer issues though does report in 1990s he was recommended for PBx but declined at that time. He notes mild urinary issues. Denies hematuria.     PVR today 400cc. Escalante catheter placed in ER with return of 400cc yellow urine.     CT scan today shows nodular, enlarged prostate with extensive LAD. Mild bilateral hydroureteronephrosis with some tortuosity of ureters. Thickened bladder with moderate distention.    Interval History: s/p bilateral stents and prostate biopsy on 1/14, tolerating stents and Escalante.      Review of Systems   Constitutional: Negative.    HENT: Negative.    Eyes: Negative.    Respiratory: Negative for cough, chest tightness and shortness of breath.    Cardiovascular: Negative for chest pain.   Gastrointestinal: Negative.  Negative for constipation, diarrhea and nausea.   Genitourinary: Positive for hematuria. Negative for flank pain.   Musculoskeletal: Negative.    Neurological: Negative.    Psychiatric/Behavioral: Negative.      Objective:     Temp:  [97.3 °F (36.3 °C)-98.6 °F (37 °C)] 97.3 °F (36.3 °C)  Pulse:  [72-90] 83  Resp:  [8-20] 18  SpO2:  [93 %-98 %] 96 %  BP: (130-178)/(61-81) 137/65     Body mass index is 26.93 kg/m².       Bladder Scan Volume (mL): 402 mL (01/10/19 1226)  Post Void Cath Residual (mL): 380 mL (01/10/19 1125)    Drains     Drain                 Ureteral Drain/Stent 01/14/19 1258 Left ureter 6 Fr. less than 1 day         Ureteral Drain/Stent 01/14/19 1258 Right ureter 4.8 Fr. less than 1 day         Urethral Catheter 01/14/19 1248 Latex 20 Fr. less than 1 day                Physical Exam    Nursing note and vitals reviewed.  Constitutional: He is oriented to person, place, and time. He appears well-developed and well-nourished.   HENT:   Head: Normocephalic.   Eyes: Conjunctivae are normal.   Neck: Normal range of motion. Neck supple. No tracheal deviation present. No thyromegaly present.   Cardiovascular: Normal rate and normal heart sounds.    Pulmonary/Chest: Effort normal and breath sounds normal. No respiratory distress. He has no wheezes.   Abdominal: Soft. Bowel sounds are normal. There is no hepatosplenomegaly. There is no tenderness. There is no rebound and no CVA tenderness. No hernia.   Genitourinary:   Genitourinary Comments: Escalante in place with pink urine   Musculoskeletal: Normal range of motion. He exhibits no edema or tenderness.   Lymphadenopathy:     He has no cervical adenopathy.   Neurological: He is alert and oriented to person, place, and time.   Skin: Skin is warm and dry. No rash noted. No erythema.     Psychiatric: He has a normal mood and affect. His behavior is normal. Judgment and thought content normal.       Significant Labs:    BMP:  Recent Labs   Lab 01/13/19  0505 01/14/19  0357 01/15/19  0350   * 136 134*   K 4.0 3.9 4.0    101 100   CO2 25 22* 22*   BUN 19 21 21   CREATININE 2.1* 2.1* 1.9*   CALCIUM 9.3 8.8 8.8       CBC:   Recent Labs   Lab 01/13/19  0505 01/14/19  0357 01/15/19  0350   WBC 7.27 6.23 6.50   HGB 12.5* 12.1* 11.8*   HCT 36.9* 36.3* 35.0*    299 310       Blood Culture: No results for input(s): LABBLOO in the last 168 hours.  Urine Culture: No results for input(s): LABURIN in the last 168 hours.    Significant Imaging:                    Assessment/Plan:     * MIAN (acute kidney injury)    Slightly improved after stents     Gross hematuria    Cystoscopy with retrograde pyelogram Monday 1/14/2019  No tumors  Prostate abnormal     Bilateral hydronephrosis     - Concern for obstruction from prostate and/or LAD  S/p bilateral stents        Elevated PSA     - Highly concerning for prostate cancer  S/p biopsy  Will empirically start Casodex in preparation for likely outpatient Trelstar  Will plan for a bone scan after pathology returns    Okay to d/c home today     Enlarged prostate with urinary obstruction     - Escalante catheter, home with Escalante   - Flomax         VTE Risk Mitigation (From admission, onward)        Ordered     IP VTE HIGH RISK PATIENT  Once      01/14/19 1440     Place sequential compression device  Until discontinued      01/14/19 1440          W Eric Bardales MD  Urology  [facility]

## 2019-01-15 NOTE — PLAN OF CARE
01/15/19 1413   Final Note   Assessment Type Final Discharge Note   Anticipated Discharge Disposition Home   What phone number can be called within the next 1-3 days to see how you are doing after discharge? ([phone])   Hospital Follow Up  Appt(s) scheduled? Yes   Discharge plans and expectations educations in teach back method with documentation complete? Yes   Right Care Referral Info   Post Acute Recommendation No Care   EDUCATION:  Things You are responsible For To Manage Your Care At Home:  1.    Getting your prescriptions filled   2.    Taking your medications as directed, DO NOT MISS ANY DOSES!  3.    Going to your follow-up doctor appointment. This is important because it  allow the doctor to monitor your progress and determine if  any changes need to made to your treatment plan.  Call [facility] Help at [facility] number for new or repeated problems / symptoms   Call 911 for CP and / or SOB   Patient is discharging with arias in place.  TN discussed with patient and wife symptoms / problems to look for:  Bloody urine, decrease or sever increase in urine output.  Both verbalized understanding and stated they would call MD for any problems.    NurseAnita notified that all CM needs are met.

## 2019-01-15 NOTE — ASSESSMENT & PLAN NOTE
- Highly concerning for prostate cancer  S/p biopsy  Will empirically start Casodex in preparation for likely outpatient Trelstar  Will plan for a bone scan after pathology returns    Okay to d/c home today

## 2019-01-15 NOTE — TELEPHONE ENCOUNTER
Recent hospital patient  Started on Casodex  He will need Injection.  Looks like Eligard is covered.    Also will need bone scan, please help set up.

## 2019-01-15 NOTE — PROGRESS NOTES
[facility]  Hematology/Oncology  Progress Note    Patient Name: Obed Sood  Admission Date: 1/10/2019  Hospital Length of Stay: 5 days  Code Status: Full Code     Subjective:     Mr. Sood is a pleasant 77 YO gentleman with BPH who began to have fabien leg edema in Nov 2018. Pt was treated conservatively and underwent out pt work up. Pt began to have RLE non pitting edema and labs revealed MIAN. Pt was given IV fluid out pt and further labs obtained. PSA was found to be elevated. CT ab pelvis today revealed moderate bilateral hydronephrosis, conglomerated retroperitoneal adenopathy present surrounding the abdominal aorta and lower inferior vena cava with loss of the normal fat, Iliac chain adenopathy with bilobed soft tissue density nodule to the right of the urinary bladder anteriorly measuring 2.8 x 1.5 cm in size as well as enlarged prostate gland with a nodular projection posteriorly on the left.      Few weeks ago, he began to have lower back pain and started taking naprosyn OTC. No recent wt changes. Had difficulty ambulating due to leg edema, R> L.     Interval History:     01/15/19: underwent cystoscopy and bilateral ureteral stent placement yesterday. Feeling better. Denies fever or chills.   01/14/19: Awaiting to have cystoscopy today. Denies fever or chills.   01/13/19: scrotal edema stable. Tolerating Escalante. Denies fever or chills. Had US done yesterday.   01/12/19: states pelvic discomfort improving. Tolerating Escalante. Denies fever or chills.   01/11/19: scrotal swelling improving. Denies fever or chills.     Oncology Treatment Plan:   [No treatment plan]    Medications:  Continuous Infusions:  Scheduled Meds:   ciprofloxacin  400 mg Intravenous Q12H    tamsulosin  0.4 mg Oral Daily     PRN Meds:acetaminophen, HYDROcodone-acetaminophen, ondansetron, oxybutynin, sodium chloride 0.9%     Review of Systems     Constitutional: Denies any weigh or appetite changes.   Eyes: no visual  changes   ENT: no nasal congestion. Denies sore throat with odynophagia   Respiratory: No cough, SOB, exertional dyspnea or  hemoptysis   Cardiovascular: no chest pain or palpitations   Gastrointestinal: no nausea, vomiting or abdominal pain. Normal bowl habits   Hematologic/Lymphatic:  hematuria resolved   Musculoskeletal: back pain  : scrotal edema   Neurological: no seizures or tremors, gait or balance prblems  Skin: No rashes or lesions  Psych: Denies any anxiety, depression or insomnia    Objective:     Vital Signs (Most Recent):  Temp: 97.3 °F (36.3 °C) (01/15/19 0740)  Pulse: 83 (01/15/19 0740)  Resp: 18 (01/15/19 0740)  BP: 137/65 (01/15/19 0740)  SpO2: 96 % (01/15/19 0740) Vital Signs (24h Range):  Temp:  [97.3 °F (36.3 °C)-98.6 °F (37 °C)] 97.3 °F (36.3 °C)  Pulse:  [72-90] 83  Resp:  [8-20] 18  SpO2:  [93 %-98 %] 96 %  BP: (130-178)/(61-81) 137/65     Weight: 82.7 kg (182 lb 6.1 oz)  Body mass index is 26.93 kg/m².  Body surface area is 2.01 meters squared.      Intake/Output Summary (Last 24 hours) at 1/15/2019 0803  Last data filed at 1/15/2019 0552  Gross per 24 hour   Intake 1450 ml   Output 1100 ml   Net 350 ml       Physical Exam    General: NAD, sitting up in bed. Wife at the bedside   Head: normocephalic, atraumatic   Eyes: conjunctivae pink  Throat: No erythema or post nasal discharge   Neck: supple, no LAD   Lungs: CTAB   Heart: S1, S2, RRR   Abdomen: + BS x 4 quadrants, slightly distended  : Escalante with dark urine    Extremities: leg edema R>L  Skin: abdominal wall edema- better    MS: No sig ROM diff of extremities  Neuro: A&O x 3  Psy: pleasant, affect is congruent to mood     Significant Labs:   CBC:   Recent Labs   Lab 01/14/19  0357 01/15/19  0350   WBC 6.23 6.50   HGB 12.1* 11.8*   HCT 36.3* 35.0*    310   , CMP:   Recent Labs   Lab 01/14/19  0357 01/15/19  0350    134*   K 3.9 4.0    100   CO2 22* 22*   GLU 92 96   BUN 21 21   CREATININE 2.1* 1.9*   CALCIUM 8.8 8.8    ANIONGAP 13 12   EGFRNONAA 29* 33*   , LDH: No results for input(s): LDHCSF, BFSOURCE in the last 48 hours., Reticulocytes: No results for input(s): RETIC in the last 48 hours., Tumor Markers: No results for input(s): PSA, CEA, , AFPTM, OR9291,  in the last 48 hours.    Invalid input(s): ALGTM, Uric Acid No results for input(s): URICACID in the last 48 hours. and Urine Studies:   No results for input(s): COLORU, APPEARANCEUA, PHUR, SPECGRAV, PROTEINUA, GLUCUA, KETONESU, BILIRUBINUA, OCCULTUA, NITRITE, UROBILINOGEN, LEUKOCYTESUR, RBCUA, WBCUA, BACTERIA, SQUAMEPITHEL, HYALINECASTS in the last 48 hours.    Invalid input(s): WRIGHTSUR    Diagnostic Results:      Assessment/Plan:     Active Diagnoses:     Diagnosis Date Noted POA    MIAN (acute kidney injury) [N17.9]  - most likely due to bilateral moderate hydronephrosis and bladder outlet obstruction  - s/p Escalante insertion  - urology consulted  - cr elevated at 2.1   -   US shows no improvement    - s/pcysto and stent placement-  - cr trending down  - will dc home if ok with urology     01/10/2019 Yes    Elevated PSA [R97.20]- with significant retroperitoneal lymphadenopathy   - highly suspicious of primary prostate Ca  - s/p cystoscopy and prostate biopsy  - pt began ADT  - f/u with urology out pt for [place] LHRH agonist       01/10/2019 Yes    Bilateral hydronephrosis [N13.30]  - US - no improvement   - s/p stenting   - pt to dc home with Escalante cath    01/10/2019 Yes    Enlarged prostate with urinary obstruction [N40.1, N13.8]     - on Flomax    01/04/2019 Yes       Problems Resolved During this Admission:      DVT pro: Heparin     Back pain : start Tylenol with codeine     Overall, stable to dc home    Enrique aBrker M.D  Internal Medicine & Geriatric Medicine  Hematology & Oncology  Palliative Medicine    [address], [address]  [place] [address]  [phone] (Office)  [phone] (Fax)

## 2019-01-15 NOTE — TELEPHONE ENCOUNTER
Bone scan set up and patient notified. Trelstar and eligard injections PA being worked to see which will be covered in benefits. Patient notified and apt confirmed.

## 2019-01-15 NOTE — PLAN OF CARE
01/15/19 1330   Medicare Message   Important Message from Medicare regarding Discharge Appeal Rights Given to patient/caregiver;Explained to patient/caregiver;Signed/date by patient/caregiver

## 2019-01-16 NOTE — ANESTHESIA POSTPROCEDURE EVALUATION
"Anesthesia Post Evaluation    Patient: Obed Sood    Procedure(s) Performed: Procedure(s) (LRB):  CYSTOSCOPY, WITH RETROGRADE PYELOGRAM AND BILATERAL URETERAL STENT INSERTION (Bilateral)  BIOPSY, PROSTATE (N/A)    Final Anesthesia Type: general  Patient location during evaluation: PACU  Patient participation: Yes- Able to Participate  Level of consciousness: awake and alert  Post-procedure vital signs: reviewed and stable  Pain management: adequate  Airway patency: patent  PONV status at discharge: No PONV  Anesthetic complications: no      Cardiovascular status: blood pressure returned to baseline and hemodynamically stable  Respiratory status: unassisted and spontaneous ventilation  Hydration status: euvolemic  Follow-up not needed.        Visit Vitals  BP (!) 120/57 (BP Location: Left arm, Patient Position: Lying)   Pulse 83   Temp 36.9 °C (98.4 °F) (Oral)   Resp 18   Ht 5' 9" (1.753 m)   Wt 82.7 kg (182 lb 6.1 oz)   SpO2 95%   BMI 26.93 kg/m²       Pain/Andrew Score: Pain Rating Prior to Med Admin: 5 (1/15/2019  7:52 AM)        "

## 2019-01-16 NOTE — DISCHARGE SUMMARY
[facility]  Hematology/Oncology  Discharge Summary      Patient Name: Obed Sood  MRN: [id number]  Admission Date: 1/10/2019  Hospital Length of Stay: 5 days  Discharge Date and Time: 1/15/2019  2:31 PM  Attending Physician: No att. providers found   Discharging Provider: Enrique Barker MD  Primary Care Provider: Enrique Barker MD    HPI:     Mr. Sood is a pleasant 79 YO gentleman with BPH who began to have fabien leg edema in Nov 2018. Pt was treated conservatively and underwent out pt work up. Pt began to have RLE non pitting edema and labs revealed MIAN. Pt was given IV fluid out pt and further labs obtained. PSA was found to be elevated. CT ab pelvis today revealed moderate bilateral hydronephrosis, conglomerated retroperitoneal adenopathy present surrounding the abdominal aorta and lower inferior vena cava with loss of the normal fat, Iliac chain adenopathy with bilobed soft tissue density nodule to the right of the urinary bladder anteriorly measuring 2.8 x 1.5 cm in size as well as enlarged prostate gland with a nodular projection posteriorly on the left.      Few weeks ago, he began to have lower back pain and started taking naprosyn OTC. No recent wt changes. Had difficulty ambulating due to leg edema, R> L.        Procedure(s) (LRB):  CYSTOSCOPY, WITH RETROGRADE PYELOGRAM AND BILATERAL URETERAL STENT INSERTION (Bilateral)  BIOPSY, PROSTATE (N/A)     Hospital Course:     During this hospitalization, these following conditions were addressed and managed along with other comorbid conditions:      Active Diagnoses:     Diagnosis Date Noted POA    MIAN (acute kidney injury) [N17.9]  - most likely due to bilateral moderate hydronephrosis and bladder outlet obstruction  - s/p Escalante insertion  - urology consulted  - cr elevated at 2.1   -   US shows no improvement    - s/pcysto and stent placement-  - cr trending down  - will dc home if ok with urology     01/10/2019 Yes    Elevated  PSA [R97.20]- with significant retroperitoneal lymphadenopathy   - highly suspicious of primary prostate Ca  - s/p cystoscopy and prostate biopsy  - pt began ADT  - f/u with urology out pt for [place] LHRH agonist       01/10/2019 Yes    Bilateral hydronephrosis [N13.30]  - US - no improvement   - s/p stenting   - pt to dc home with Escalante cath    01/10/2019 Yes    Enlarged prostate with urinary obstruction [N40.1, N13.8]     - on Flomax    01/04/2019 Yes       Problems Resolved During this Admission:      DVT pro: Heparin     Back pain : start Tylenol with codeine     Overall, stable to dc home    Consults:   Consults (From admission, onward)        Status Ordering Provider     Inpatient consult to Urology  Once     Provider:  Myriam Spivey MD    Completed CARMELITA RODRÍGUEZ          Significant Diagnostic Studies: see above     Pending Diagnostic Studies:     Procedure Component Value Units Date/Time    SURG [place] Surgery Retrograde Pyelogram [[id number]] Resulted:  01/14/19 1221    Order Status:  Sent Lab Status:  In process Updated:  01/14/19 1255        Final Active Diagnoses:    Diagnosis Date Noted POA    PRINCIPAL PROBLEM:  MIAN (acute kidney injury) [N17.9] 01/10/2019 Yes    Gross hematuria [R31.0] 01/12/2019 No    Elevated PSA [R97.20] 01/10/2019 Yes    Bilateral hydronephrosis [N13.30] 01/10/2019 Yes    Enlarged prostate with urinary obstruction [N40.1, N13.8] 01/04/2019 Yes      Problems Resolved During this Admission:      Discharged Condition: stable    Disposition: Home or Self Care     Activity: As tolerated    Diet: Low salt     Follow Up:  Follow-up Information     W Eric Bardales MD On 1/28/2019.    Specialty:  Urology  Why:  @2:30pm Ms Dmitry NP for urology follow up  Contact information:  [address]  [address] [place] [address]  [phone]             Enrique Barker MD On 1/18/2019.    Specialties:  Internal Medicine, Oncology, Hematology and Oncology  Why:  @10:55, [facility]  Follow up  Contact information:  [address]  [address]  [facility] [address]  [phone]                 Patient Instructions:   No discharge procedures on file.  Medications:  Reconciled Home Medications:      Medication List      START taking these medications    bicalutamide 50 MG Tab  Commonly known as:  CASODEX  Take 1 tablet (50 mg total) by mouth once daily.     oxybutynin 5 MG Tab  Commonly known as:  DITROPAN  Take 1 tablet (5 mg total) by mouth 3 (three) times daily.        CONTINUE taking these medications    acetaminophen-codeine 300-30mg 300-30 mg Tab  Commonly known as:  TYLENOL-CODEINE #3  Take 1 tablet by mouth every 6 (six) hours as needed.     saw palmetto 160 MG capsule  Take 160 mg by mouth 2 (two) times daily.     tamsulosin 0.4 mg Cap  Commonly known as:  FLOMAX  Take 1 capsule (0.4 mg total) by mouth once daily.        STOP taking these medications    triamterene-hydrochlorothiazide 37.5-25 mg 37.5-25 mg per capsule  Commonly known as:  DYAZIDE            Enrique Barker M.D  Internal Medicine & Geriatric Medicine  Hematology & Oncology  Palliative Medicine    [address], [address]  [place] [address]  [phone] (Office)  [phone] (Fax)

## 2019-01-16 NOTE — TELEPHONE ENCOUNTER
DOCUMENTATION ONLY:  Prior authorization for Trelstar approved from 01/16/2019 to 01/16/2020  Case ID: [id number]    Co-pay: $2,491.85    Patient Assistance IS required. Sending to the financial assistance team to investigate assistance options. Yvon PEREA

## 2019-01-18 PROBLEM — R33.9 URINARY RETENTION: Status: ACTIVE | Noted: 2019-01-18

## 2019-01-21 NOTE — TELEPHONE ENCOUNTER
[facility] is unable to fill Trestar under this patients pharmacy benefit due to 2491.85 co pay.    Facility administered medications administered at [facility] must come from within [facility].    It is possible that this medication may be covered under the patients Medical Benefit. Please re-enter the order in EPIC as a medication order. For any further questions, please contact [facility] at [phone].    If for any reason the medication is not covered under the Medical Benefit; a prior authorization has been approved  and can be filled at [facility] if the patient would like to pay the co pay or if funding becomes available.    Sending a staff message to Dr Bardales regarding Buy and Bill.

## 2019-01-22 ENCOUNTER — HOSPITAL ENCOUNTER (OUTPATIENT)
Dept: RADIOLOGY | Facility: HOSPITAL | Age: 79
Discharge: HOME | End: 2019-01-22
Attending: UROLOGY
Payer: MEDICARE

## 2019-01-22 DIAGNOSIS — D49.59 NEOPLASM OF PROSTATE: ICD-10-CM

## 2019-01-22 PROCEDURE — A9503 TC99M MEDRONATE: HCPCS

## 2019-01-22 PROCEDURE — 78306 BONE IMAGING WHOLE BODY: CPT | Mod: 26,,, | Performed by: RADIOLOGY

## 2019-01-22 PROCEDURE — 78306 NM BONE SCAN WHOLE BODY: ICD-10-PCS | Mod: 26,,, | Performed by: RADIOLOGY

## 2019-01-28 ENCOUNTER — OFFICE VISIT (OUTPATIENT)
Dept: UROLOGY | Facility: CLINIC | Age: 79
End: 2019-01-28
Payer: MEDICARE

## 2019-01-28 VITALS
DIASTOLIC BLOOD PRESSURE: 70 MMHG | HEIGHT: 69 IN | SYSTOLIC BLOOD PRESSURE: 122 MMHG | BODY MASS INDEX: 28.58 KG/M2 | WEIGHT: 193 LBS

## 2019-01-28 DIAGNOSIS — R31.0 GROSS HEMATURIA: ICD-10-CM

## 2019-01-28 DIAGNOSIS — N13.30 BILATERAL HYDRONEPHROSIS: ICD-10-CM

## 2019-01-28 DIAGNOSIS — C61 PROSTATE CANCER: ICD-10-CM

## 2019-01-28 DIAGNOSIS — R33.9 URINARY RETENTION: Primary | ICD-10-CM

## 2019-01-28 PROCEDURE — 99999 PR PBB SHADOW E&M-EST. PATIENT-LVL III: ICD-10-PCS | Mod: PBBFAC,,, | Performed by: NURSE PRACTITIONER

## 2019-01-28 PROCEDURE — 96402 CHEMO HORMON ANTINEOPL SQ/IM: CPT | Mod: PBBFAC

## 2019-01-28 PROCEDURE — 99213 OFFICE O/P EST LOW 20 MIN: CPT | Mod: PBBFAC,25 | Performed by: NURSE PRACTITIONER

## 2019-01-28 PROCEDURE — 99214 PR OFFICE/OUTPT VISIT, EST, LEVL IV, 30-39 MIN: ICD-10-PCS | Mod: S$PBB,,, | Performed by: NURSE PRACTITIONER

## 2019-01-28 PROCEDURE — 99214 OFFICE O/P EST MOD 30 MIN: CPT | Mod: S$PBB,,, | Performed by: NURSE PRACTITIONER

## 2019-01-28 PROCEDURE — 99999 PR PBB SHADOW E&M-EST. PATIENT-LVL III: CPT | Mod: PBBFAC,,, | Performed by: NURSE PRACTITIONER

## 2019-01-28 RX ADMIN — TRIPTORELIN PAMOATE 11.25 MG: KIT at 03:01

## 2019-01-28 NOTE — PROGRESS NOTES
Subjective:       Patient ID: Obed Sood is a 78 y.o. male who is an established patient of Josue Spivey and Catia though new to me was seen for hospital follow up    Chief Complaint:   Chief Complaint   Patient presents with    Other     Patient states catheter was placed due to urinary retention.. catheter was back flowing and he was admitted     Patient recently admitted with ARF and LE edema. He was seen as an inpatient consult by Dr. Spivey on 1/10/19 for acute kidney injury with hydronephrosis. He was also found to have an elevated PSA of 97.2.  He has no known prostate cancer issues though does report in 1990s he was recommended for PBx but declined at that time. He notes mild urinary issues. Denies hematuria.      PVR 1/10/19-- 400cc. Arias catheter placed in ER with return of 400cc yellow urine.     CT scan -- nodular, enlarged prostate with extensive LAD. Mild bilateral hydroureteronephrosis with some tortuosity of ureters. Thickened bladder with moderate distention.   Hydronephrosis remained persistent despite Arias catheter. Therefore, he underwent cystoscopy/bilateral RPG/bilateral stent placement on 1/14/19 by Dr. Bardales.    He also underwent prostate biopsy on 1/14/19. His pathology showed: prostate cancer. He was started on Casodex during recent admission. He is here today for follow up. He is tolerating his arias and stents. Denies gross hematuria, flank pain or bone pain.      ACTIVE MEDICAL ISSUES:  Patient Active Problem List   Diagnosis    Elevated blood pressure reading    Nocturia    Benign prostatic hyperplasia    Hyperlipidemia LDL goal <100    Elevated blood pressure reading    HTN, goal below 140/90    Bilateral leg edema    Leg edema, right    Enlarged prostate with urinary obstruction    Lymphedema    Scrotal swelling    Abdominal pain    MIAN (acute kidney injury)    Elevated PSA    Bilateral hydronephrosis    Gross hematuria    Urinary retention    Prostate  "cancer       ALLERGIES AND MEDICATIONS: updated and reviewed.  Review of patient's allergies indicates:  No Known Allergies  Current Outpatient Medications   Medication Sig    bicalutamide (CASODEX) 50 MG Tab Take 1 tablet (50 mg total) by mouth once daily.    leuprolide, 6 month, (ELIGARD) 45 mg injection Inject 45 mg into the skin every 6 (six) months.    oxybutynin (DITROPAN) 5 MG Tab Take 1 tablet (5 mg total) by mouth 3 (three) times daily.    saw palmetto 160 MG capsule Take 160 mg by mouth 2 (two) times daily.    tamsulosin (FLOMAX) 0.4 mg Cap Take 1 capsule (0.4 mg total) by mouth once daily.    triptorelin pamoate (TRELSTAR) 11.25 mg SusR Inject 11.25 mg into the muscle every 12 weeks.     No current facility-administered medications for this visit.        Review of Systems   Constitutional: Negative for activity change, chills, fatigue, fever and unexpected weight change.   Eyes: Negative for discharge, redness and visual disturbance.   Respiratory: Negative for cough, shortness of breath and wheezing.    Cardiovascular: Negative for chest pain and leg swelling.   Gastrointestinal: Negative for abdominal distention, abdominal pain, constipation, diarrhea, nausea and vomiting.   Genitourinary: Negative for decreased urine volume, difficulty urinating, dysuria, flank pain, frequency, hematuria and urgency.   Musculoskeletal: Negative for arthralgias, joint swelling and myalgias.   Skin: Negative for color change and rash.   Neurological: Negative for dizziness and light-headedness.   Psychiatric/Behavioral: Negative for behavioral problems and confusion. The patient is not nervous/anxious.        Objective:      Vitals:    01/28/19 1430   BP: 122/70   Weight: 87.5 kg (193 lb 0.2 oz)   Height: 5' 9" (1.753 m)     Physical Exam   Constitutional: He is oriented to person, place, and time. He appears well-developed.   HENT:   Head: Normocephalic and atraumatic.   Nose: Nose normal.   Eyes: Conjunctivae are " normal. Right eye exhibits no discharge. Left eye exhibits no discharge.   Neck: Normal range of motion. Neck supple. No tracheal deviation present. No thyromegaly present.   Cardiovascular: Normal rate and regular rhythm.    Pulmonary/Chest: Effort normal. No respiratory distress. He has no wheezes.   Abdominal: Soft. He exhibits no distension. There is no hepatosplenomegaly. There is no tenderness. There is no CVA tenderness. No hernia.   Genitourinary:   Genitourinary Comments: Arias draining clear yellow urine   Musculoskeletal: Normal range of motion. He exhibits edema (fabien LE).   Neurological: He is alert and oriented to person, place, and time.   Skin: Skin is warm and dry. No rash noted. No erythema.     Psychiatric: He has a normal mood and affect. His behavior is normal. Judgment normal.       Urine dipstick shows not done--arias in place    Narrative     EXAMINATION:  NM BONE SCAN WHOLE BODY    CLINICAL HISTORY:  Neoplasm: prostate, staging;  Neoplasm of unspecified behavior of other genitourinary organ    TECHNIQUE:  Following the IV administration of 20.4 mCi of Tc-[id number], anterior and posterior delayed whole body images were acquired with additional views of the .    COMPARISON:  None.    FINDINGS:  There is physiologic distribution of the radiopharmaceutical throughout the skeleton. Both kidneys and the bladder appear normal.  No evidence for metastatic disease.  Mild asymmetric activity about the RIGHT knee may represent synovitis/inflammation for which correlation advised.   Impression       There is no scintigraphic evidence of metastatic disease.      Electronically signed by: Joshua Mcknight MD  Date: 01/22/2019  Time: 14:57     Reviewed with patient and Dr. Bardales    Assessment:       1. Urinary retention    2. Prostate cancer    3. Bilateral hydronephrosis    4. Gross hematuria          Plan:       1. Prostate cancer  -Trelstar injection today  -Patient currently taking Casodex  -RTC in 3  months with labs  - PROSTATE SPECIFIC ANTIGEN, DIAGNOSTIC; Future  - Testosterone; Future    2. Urinary retention  -Offered voiding trial in clinic today--patient declined. Patient at increased risk of infection with indwelling catheter.  He would like to leave catheter in for a little longer  -He will RTC in 2 weeks for voiding trial  -Instructed patient to stop oxybutynin  -Continue Flomax    3. Bilateral hydronephrosis  -s/p bilateral stent placement on 1/14/19  -Cr 1/18/19--1.4--improving    4. Gross hematuria  -Cystoscopy with retrograde pyelogram Monday 1/14/2019  -No tumors  -Prostate abnormal          Follow-up in about 2 weeks (around 2/11/2019) for voiding trial, 3 months with labs.

## 2019-01-28 NOTE — PROGRESS NOTES
Name, , and allergies verified. Patient verbalized understanding of why they are here today. Patient here for Trelstar Injection. Trelstar 11.25 mg given IM with no adverse effects. (See MAR)  Patient tolerated procedure well.  Patient was asked to wait an appropriate 15 minutes in ensure that no reaction occurred. Patient was discharged with no acute distress and was instructed to call the office if any questions or concerns arose.

## 2019-01-28 NOTE — LETTER
January 28, 2019      ROSINA Bardales MD  [address] [facility] [address]  [address]  [place] [address]           [facility]  [address] [facility] [address]  [place] [address]  Phone: [phone]  Fax: [phone]          Patient: Obed Sood   MR Number: [id number]   YOB: 1940   Date of Visit: 1/28/2019       Dear Dr. ROSINA Bardales:    Thank you for referring Obed Sood to me for evaluation. Attached you will find relevant portions of my assessment and plan of care.    If you have questions, please do not hesitate to call me. I look forward to following Obed Sood along with you.    Sincerely,    Eufemia To, DARIO    Enclosure  CC:  No Recipients    If you would like to receive this communication electronically, please contact [email or web address] or [phone] to request more information on [employer] Link access.    For providers and/or their staff who would like to refer a patient to [facility], please contact us through our one-stop-shop provider referral line, [facility], at [phone].    If you feel you have received this communication in error or would no longer like to receive these types of communications, please e-mail [email or web address]

## 2019-01-31 ENCOUNTER — TELEPHONE (OUTPATIENT)
Dept: UROLOGY | Facility: CLINIC | Age: 79
End: 2019-01-31

## 2019-01-31 PROBLEM — E87.70 HYPERVOLEMIA: Status: ACTIVE | Noted: 2019-01-31

## 2019-01-31 PROBLEM — E44.0 MODERATE PROTEIN-CALORIE MALNUTRITION: Status: ACTIVE | Noted: 2019-01-31

## 2019-01-31 NOTE — TELEPHONE ENCOUNTER
Spoke to pt's wife advised her to call pcp for wrist an feet swelling.  states she will do so.-vicki

## 2019-01-31 NOTE — TELEPHONE ENCOUNTER
----- Message from Lena Garcia sent at 1/31/2019 12:14 PM CST -----  Contact: pt's wife paul [phone]  Name of Who is Calling: pt's wife      What is the request in detail: pt is carrying extra fluid around his waist and feet. Call pt's wife      Can the clinic reply by [facility]: no      What Number to Call Back if not in [facility]: pt's wife paul [phone]

## 2019-02-12 ENCOUNTER — OFFICE VISIT (OUTPATIENT)
Dept: UROLOGY | Facility: CLINIC | Age: 79
End: 2019-02-12
Payer: MEDICARE

## 2019-02-12 VITALS
HEIGHT: 69 IN | SYSTOLIC BLOOD PRESSURE: 131 MMHG | BODY MASS INDEX: 28.96 KG/M2 | WEIGHT: 195.56 LBS | DIASTOLIC BLOOD PRESSURE: 80 MMHG

## 2019-02-12 DIAGNOSIS — R33.9 URINARY RETENTION: ICD-10-CM

## 2019-02-12 DIAGNOSIS — N13.30 BILATERAL HYDRONEPHROSIS: Primary | ICD-10-CM

## 2019-02-12 DIAGNOSIS — N13.30 BILATERAL HYDRONEPHROSIS: ICD-10-CM

## 2019-02-12 DIAGNOSIS — C61 PROSTATE CANCER: Primary | ICD-10-CM

## 2019-02-12 DIAGNOSIS — R31.0 GROSS HEMATURIA: ICD-10-CM

## 2019-02-12 PROCEDURE — 51700 IRRIGATION OF BLADDER: CPT | Mod: PBBFAC | Performed by: NURSE PRACTITIONER

## 2019-02-12 PROCEDURE — 51702 INSERT TEMP BLADDER CATH: CPT | Mod: PBBFAC | Performed by: NURSE PRACTITIONER

## 2019-02-12 PROCEDURE — 99999 PR PBB SHADOW E&M-EST. PATIENT-LVL III: CPT | Mod: PBBFAC,,, | Performed by: NURSE PRACTITIONER

## 2019-02-12 PROCEDURE — 99999 PR PBB SHADOW E&M-EST. PATIENT-LVL III: ICD-10-PCS | Mod: PBBFAC,,, | Performed by: NURSE PRACTITIONER

## 2019-02-12 PROCEDURE — 99214 PR OFFICE/OUTPT VISIT, EST, LEVL IV, 30-39 MIN: ICD-10-PCS | Mod: S$PBB,25,, | Performed by: NURSE PRACTITIONER

## 2019-02-12 PROCEDURE — 51700 PR IRRIGATION, BLADDER: ICD-10-PCS | Mod: S$PBB,,, | Performed by: NURSE PRACTITIONER

## 2019-02-12 PROCEDURE — 51700 IRRIGATION OF BLADDER: CPT | Mod: S$PBB,,, | Performed by: NURSE PRACTITIONER

## 2019-02-12 PROCEDURE — 99214 OFFICE O/P EST MOD 30 MIN: CPT | Mod: S$PBB,25,, | Performed by: NURSE PRACTITIONER

## 2019-02-12 PROCEDURE — 99213 OFFICE O/P EST LOW 20 MIN: CPT | Mod: PBBFAC | Performed by: NURSE PRACTITIONER

## 2019-02-12 RX ORDER — CIPROFLOXACIN 2 MG/ML
400 INJECTION, SOLUTION INTRAVENOUS
Status: CANCELLED | OUTPATIENT
Start: 2019-02-12

## 2019-02-12 NOTE — H&P
Chief Complaint:        Chief Complaint   Patient presents with    Follow-up       VOT patient does understnad the process.. wife states he's cold all the time..says urine ouput yesterday was much lower than has been      Patient recently admitted with ARF and LE edema. He was seen as an inpatient consult by Dr. Spivey on 1/10/19 for acute kidney injury with hydronephrosis. He was also found to have an elevated PSA of 97.2.  He has no known prostate cancer issues though does report in 1990s he was recommended for PBx but declined at that time. He notes mild urinary issues. Denies hematuria.      PVR 1/10/19-- 400cc. Arias catheter placed in ER with return of 400cc yellow urine.     CT scan -- nodular, enlarged prostate with extensive LAD. Mild bilateral hydroureteronephrosis with some tortuosity of ureters. Thickened bladder with moderate distention.   Hydronephrosis remained persistent despite Arias catheter. Therefore, he underwent cystoscopy/bilateral RPG/bilateral stent placement on 1/14/19 by Dr. Bardales.     He also underwent prostate biopsy on 1/14/19. His pathology showed: prostate cancer. He was started on Casodex during recent admission. He received Trelstar 11.25mg injection on 1/28/19. Denies bone pain or loss of appetite     He is here today for a voiding trial. He is tolerating his arias and stents. Denies gross hematuria or flank pain.      ACTIVE MEDICAL ISSUES:      Patient Active Problem List   Diagnosis    Elevated blood pressure reading    Nocturia    Benign prostatic hyperplasia    Hyperlipidemia LDL goal <100    Elevated blood pressure reading    HTN, goal below 140/90    Bilateral leg edema    Leg edema, right    Enlarged prostate with urinary obstruction    Lymphedema    Scrotal swelling    Abdominal pain    MIAN (acute kidney injury)    Elevated PSA    Bilateral hydronephrosis    Gross hematuria    Urinary retention    Prostate cancer    Hypervolemia    Moderate  "protein-calorie malnutrition         ALLERGIES AND MEDICATIONS: updated and reviewed.  Review of patient's allergies indicates:  No Known Allergies       Current Outpatient Medications   Medication Sig    bicalutamide (CASODEX) 50 MG Tab Take 1 tablet (50 mg total) by mouth once daily.    leuprolide, 6 month, (ELIGARD) 45 mg injection Inject 45 mg into the skin every 6 (six) months.    saw palmetto 160 MG capsule Take 160 mg by mouth 2 (two) times daily.    tamsulosin (FLOMAX) 0.4 mg Cap Take 1 capsule (0.4 mg total) by mouth once daily.    triptorelin pamoate (TRELSTAR) 11.25 mg SusR Inject 11.25 mg into the muscle every 12 weeks.      No current facility-administered medications for this visit.          Review of Systems   Constitutional: Negative for activity change, chills, fatigue, fever and unexpected weight change.   Eyes: Negative for discharge, redness and visual disturbance.   Respiratory: Negative for cough, shortness of breath and wheezing.    Cardiovascular: Negative for chest pain and leg swelling.   Gastrointestinal: Negative for abdominal distention, abdominal pain, constipation, diarrhea, nausea and vomiting.   Genitourinary: Negative for decreased urine volume, difficulty urinating, dysuria, flank pain, frequency, hematuria and urgency.   Musculoskeletal: Negative for arthralgias, joint swelling and myalgias.   Skin: Negative for color change and rash.   Neurological: Negative for dizziness and light-headedness.   Psychiatric/Behavioral: Negative for behavioral problems and confusion. The patient is not nervous/anxious.        Objective:   Vitals       Vitals:     02/12/19 1500   BP: 131/80   Weight: 88.7 kg (195 lb 8.8 oz)   Height: 5' 9" (1.753 m)         Physical Exam   Constitutional: He is oriented to person, place, and time. He appears well-developed.   HENT:   Head: Normocephalic and atraumatic.   Nose: Nose normal.   Eyes: Conjunctivae are normal. Right eye exhibits no discharge. Left " eye exhibits no discharge.   Neck: Normal range of motion. Neck supple. No tracheal deviation present. No thyromegaly present.   Cardiovascular: Normal rate and regular rhythm.    Pulmonary/Chest: Effort normal. No respiratory distress. He has no wheezes.   Abdominal: Soft. He exhibits no distension. There is no hepatosplenomegaly. There is no tenderness. There is no CVA tenderness. No hernia.   Genitourinary:   Genitourinary Comments: Arias draining clear yellow urine   Musculoskeletal: Normal range of motion. He exhibits edema (fabien LE).   Neurological: He is alert and oriented to person, place, and time.   Skin: Skin is warm and dry. No rash noted. No erythema.     Psychiatric: He has a normal mood and affect. His behavior is normal. Judgment normal.       Urine dipstick shows not done--arias in place     Voiding trial: 180cc instilled, 0cc voided     18 Fr arias inserted by nurse using sterile technique. Patient tolerated procedure well without any immediate complications     Assessment:       1. Prostate cancer    2. Urinary retention    3. Gross hematuria    4. Bilateral hydronephrosis           Plan:       1. Prostate cancer  -Trelstar injection 1/28/19  -Casodex  -RTC in 2 months with labs     2. Urinary retention  - Voiding Trial today--unsuccessful  -Arias replaced  -He would like to keep arias in place x 4 weeks. He will rtc at that time for another voiding trial vs arias exchange  -Continue Flomax     3. Gross hematuria  -Cystoscopy with retrograde pyelogram Monday 1/14/2019  -No tumors  -Prostate abnormal     4. Bilateral hydronephrosis  -s/p bilateral stent placement on 1/14/19  -Cr improving--PCP plan to repeat labs in 4 weeks  -Plan for cystoscopy with bilateral RPG/bilateral stent exchange on Monday 4/15/19 with Dr. Bardales

## 2019-02-12 NOTE — PROGRESS NOTES
Subjective:       Patient ID: Obed Sood is a 78 y.o. male who was last seen in this office 1/28/2019    Chief Complaint:   Chief Complaint   Patient presents with    Follow-up     VOT patient does understnad the process.. wife states he's cold all the time..says urine ouput yesterday was much lower than has been     Patient recently admitted with ARF and LE edema. He was seen as an inpatient consult by Dr. Spivey on 1/10/19 for acute kidney injury with hydronephrosis. He was also found to have an elevated PSA of 97.2.  He has no known prostate cancer issues though does report in 1990s he was recommended for PBx but declined at that time. He notes mild urinary issues. Denies hematuria.      PVR 1/10/19-- 400cc. Arias catheter placed in ER with return of 400cc yellow urine.     CT scan -- nodular, enlarged prostate with extensive LAD. Mild bilateral hydroureteronephrosis with some tortuosity of ureters. Thickened bladder with moderate distention.   Hydronephrosis remained persistent despite Arias catheter. Therefore, he underwent cystoscopy/bilateral RPG/bilateral stent placement on 1/14/19 by Dr. Bardales.    He also underwent prostate biopsy on 1/14/19. His pathology showed: prostate cancer. He was started on Casodex during recent admission. He received Trelstar 11.25mg injection on 1/28/19. Denies bone pain or loss of appetite    He is here today for a voiding trial. He is tolerating his arias and stents. Denies gross hematuria or flank pain.     ACTIVE MEDICAL ISSUES:  Patient Active Problem List   Diagnosis    Elevated blood pressure reading    Nocturia    Benign prostatic hyperplasia    Hyperlipidemia LDL goal <100    Elevated blood pressure reading    HTN, goal below 140/90    Bilateral leg edema    Leg edema, right    Enlarged prostate with urinary obstruction    Lymphedema    Scrotal swelling    Abdominal pain    MIAN (acute kidney injury)    Elevated PSA    Bilateral hydronephrosis     "Gross hematuria    Urinary retention    Prostate cancer    Hypervolemia    Moderate protein-calorie malnutrition       ALLERGIES AND MEDICATIONS: updated and reviewed.  Review of patient's allergies indicates:  No Known Allergies  Current Outpatient Medications   Medication Sig    bicalutamide (CASODEX) 50 MG Tab Take 1 tablet (50 mg total) by mouth once daily.    leuprolide, 6 month, (ELIGARD) 45 mg injection Inject 45 mg into the skin every 6 (six) months.    saw palmetto 160 MG capsule Take 160 mg by mouth 2 (two) times daily.    tamsulosin (FLOMAX) 0.4 mg Cap Take 1 capsule (0.4 mg total) by mouth once daily.    triptorelin pamoate (TRELSTAR) 11.25 mg SusR Inject 11.25 mg into the muscle every 12 weeks.     No current facility-administered medications for this visit.        Review of Systems   Constitutional: Negative for activity change, chills, fatigue, fever and unexpected weight change.   Eyes: Negative for discharge, redness and visual disturbance.   Respiratory: Negative for cough, shortness of breath and wheezing.    Cardiovascular: Negative for chest pain and leg swelling.   Gastrointestinal: Negative for abdominal distention, abdominal pain, constipation, diarrhea, nausea and vomiting.   Genitourinary: Negative for decreased urine volume, difficulty urinating, dysuria, flank pain, frequency, hematuria and urgency.   Musculoskeletal: Negative for arthralgias, joint swelling and myalgias.   Skin: Negative for color change and rash.   Neurological: Negative for dizziness and light-headedness.   Psychiatric/Behavioral: Negative for behavioral problems and confusion. The patient is not nervous/anxious.        Objective:      Vitals:    02/12/19 1500   BP: 131/80   Weight: 88.7 kg (195 lb 8.8 oz)   Height: 5' 9" (1.753 m)     Physical Exam   Constitutional: He is oriented to person, place, and time. He appears well-developed.   HENT:   Head: Normocephalic and atraumatic.   Nose: Nose normal.   Eyes: " Conjunctivae are normal. Right eye exhibits no discharge. Left eye exhibits no discharge.   Neck: Normal range of motion. Neck supple. No tracheal deviation present. No thyromegaly present.   Cardiovascular: Normal rate and regular rhythm.    Pulmonary/Chest: Effort normal. No respiratory distress. He has no wheezes.   Abdominal: Soft. He exhibits no distension. There is no hepatosplenomegaly. There is no tenderness. There is no CVA tenderness. No hernia.   Genitourinary:   Genitourinary Comments: Arias draining clear yellow urine   Musculoskeletal: Normal range of motion. He exhibits edema (fabien LE).   Neurological: He is alert and oriented to person, place, and time.   Skin: Skin is warm and dry. No rash noted. No erythema.     Psychiatric: He has a normal mood and affect. His behavior is normal. Judgment normal.       Urine dipstick shows not done--arias in place    Voiding trial: 180cc instilled, 0cc voided    18 Fr arias inserted by nurse using sterile technique. Patient tolerated procedure well without any immediate complications    Assessment:       1. Prostate cancer    2. Urinary retention    3. Gross hematuria    4. Bilateral hydronephrosis          Plan:       1. Prostate cancer  -Trelstar injection 1/28/19  -Casodex  -RTC in 2 months with labs    2. Urinary retention  - Voiding Trial today--unsuccessful  -Arias replaced  -He would like to keep arias in place x 4 weeks. He will rtc at that time for another voiding trial vs arias exchange  -Continue Flomax    3. Gross hematuria  -Cystoscopy with retrograde pyelogram Monday 1/14/2019  -No tumors  -Prostate abnormal    4. Bilateral hydronephrosis  -s/p bilateral stent placement on 1/14/19  -Cr improving--PCP plan to repeat labs in 4 weeks  -Plan for cystoscopy with bilateral RPG/bilateral stent exchange on Monday 4/15/19 with Dr. Bardales            Follow-up in about 4 weeks (around 3/12/2019) for possible voiding trial/arias exchange.

## 2019-02-14 ENCOUNTER — HOSPITAL ENCOUNTER (OUTPATIENT)
Dept: RADIOLOGY | Facility: HOSPITAL | Age: 79
Discharge: HOME | End: 2019-02-14
Attending: INTERNAL MEDICINE
Payer: MEDICARE

## 2019-02-14 DIAGNOSIS — C61 PROSTATE CANCER: ICD-10-CM

## 2019-02-14 PROCEDURE — 78815 PET IMAGE W/CT SKULL-THIGH: CPT | Mod: 26,PI,, | Performed by: RADIOLOGY

## 2019-02-14 PROCEDURE — 78815 NM PET CT ROUTINE: ICD-10-PCS | Mod: 26,PI,, | Performed by: RADIOLOGY

## 2019-02-14 PROCEDURE — A9552 F18 FDG: HCPCS

## 2019-02-14 PROCEDURE — 78815 PET IMAGE W/CT SKULL-THIGH: CPT | Mod: TC,PI

## 2019-02-19 PROBLEM — C61 STAGE IV ADENOCARCINOMA OF PROSTATE: Status: ACTIVE | Noted: 2019-02-19

## 2019-02-19 PROBLEM — K59.01 SLOW TRANSIT CONSTIPATION: Status: ACTIVE | Noted: 2019-02-19

## 2019-03-12 ENCOUNTER — OFFICE VISIT (OUTPATIENT)
Dept: UROLOGY | Facility: CLINIC | Age: 79
DRG: 698 | End: 2019-03-12
Payer: MEDICARE

## 2019-03-12 VITALS
HEIGHT: 66 IN | BODY MASS INDEX: 26.65 KG/M2 | DIASTOLIC BLOOD PRESSURE: 70 MMHG | SYSTOLIC BLOOD PRESSURE: 110 MMHG | WEIGHT: 165.81 LBS

## 2019-03-12 DIAGNOSIS — R31.0 GROSS HEMATURIA: ICD-10-CM

## 2019-03-12 DIAGNOSIS — C61 PROSTATE CANCER: ICD-10-CM

## 2019-03-12 DIAGNOSIS — R33.9 URINARY RETENTION: Primary | ICD-10-CM

## 2019-03-12 DIAGNOSIS — N13.30 BILATERAL HYDRONEPHROSIS: ICD-10-CM

## 2019-03-12 PROCEDURE — 51700 PR IRRIGATION, BLADDER: ICD-10-PCS | Mod: S$PBB,,, | Performed by: NURSE PRACTITIONER

## 2019-03-12 PROCEDURE — 99214 PR OFFICE/OUTPT VISIT, EST, LEVL IV, 30-39 MIN: ICD-10-PCS | Mod: S$PBB,25,, | Performed by: NURSE PRACTITIONER

## 2019-03-12 PROCEDURE — 51700 IRRIGATION OF BLADDER: CPT | Mod: PBBFAC | Performed by: NURSE PRACTITIONER

## 2019-03-12 PROCEDURE — 99213 OFFICE O/P EST LOW 20 MIN: CPT | Mod: PBBFAC | Performed by: NURSE PRACTITIONER

## 2019-03-12 PROCEDURE — 99999 PR PBB SHADOW E&M-EST. PATIENT-LVL III: CPT | Mod: PBBFAC,,, | Performed by: NURSE PRACTITIONER

## 2019-03-12 PROCEDURE — 99214 OFFICE O/P EST MOD 30 MIN: CPT | Mod: S$PBB,25,, | Performed by: NURSE PRACTITIONER

## 2019-03-12 PROCEDURE — 51700 IRRIGATION OF BLADDER: CPT | Mod: S$PBB,,, | Performed by: NURSE PRACTITIONER

## 2019-03-12 PROCEDURE — 99999 PR PBB SHADOW E&M-EST. PATIENT-LVL III: ICD-10-PCS | Mod: PBBFAC,,, | Performed by: NURSE PRACTITIONER

## 2019-03-12 NOTE — PROGRESS NOTES
Subjective:       Patient ID: Obed Sood is a 78 y.o. male who was last seen in this office 2/12/2019    Chief Complaint:   Chief Complaint   Patient presents with    Follow-up     Patient states here for VOT no new issues..     Patient recently admitted with ARF and LE edema. He was seen as an inpatient consult by Dr. Spivey on 1/10/19 for acute kidney injury with hydronephrosis. He was also found to have an elevated PSA of 97.2.  He has no known prostate cancer issues though does report in 1990s he was recommended for PBx but declined at that time. He notes mild urinary issues. Denies hematuria.      PVR 1/10/19-- 400cc. Arias catheter placed in ER with return of 400cc yellow urine.     CT scan -- nodular, enlarged prostate with extensive LAD. Mild bilateral hydroureteronephrosis with some tortuosity of ureters. Thickened bladder with moderate distention.   Hydronephrosis remained persistent despite Arias catheter. Therefore, he underwent cystoscopy/bilateral RPG/bilateral stent placement on 1/14/19 by Dr. Bardales.    He also underwent prostate biopsy on 1/14/19. His pathology showed: prostate cancer. He was started on Casodex during recent admission. He received Trelstar 11.25mg injection on 1/28/19. Denies bone pain or loss of appetite    He had a voiding trial in this office on 2/12/19 which was unsuccessful. Arias replaced. He is here today for a repeat voiding trial. He is tolerating his arias and stents. Denies gross hematuria or flank pain. He reports improvement in fabien LE edema. He is able to ambulate better without walker. No new issues    ACTIVE MEDICAL ISSUES:  Patient Active Problem List   Diagnosis    Elevated blood pressure reading    Nocturia    Benign prostatic hyperplasia    Hyperlipidemia LDL goal <100    Elevated blood pressure reading    HTN, goal below 140/90    Bilateral leg edema    Leg edema, right    Enlarged prostate with urinary obstruction    Lymphedema    Scrotal  "swelling    Abdominal pain    MIAN (acute kidney injury)    Elevated PSA    Bilateral hydronephrosis    Gross hematuria    Urinary retention    Prostate cancer    Hypervolemia    Moderate protein-calorie malnutrition    Slow transit constipation    Stage IV adenocarcinoma of prostate       ALLERGIES AND MEDICATIONS: updated and reviewed.  Review of patient's allergies indicates:  No Known Allergies  Current Outpatient Medications   Medication Sig    leuprolide, 6 month, (ELIGARD) 45 mg injection Inject 45 mg into the skin every 6 (six) months.    saw palmetto 160 MG capsule Take 160 mg by mouth 2 (two) times daily.    tamsulosin (FLOMAX) 0.4 mg Cap Take 1 capsule (0.4 mg total) by mouth once daily.    triptorelin pamoate (TRELSTAR) 11.25 mg SusR Inject 11.25 mg into the muscle every 12 weeks.    bicalutamide (CASODEX) 50 MG Tab Take 1 tablet (50 mg total) by mouth once daily.     No current facility-administered medications for this visit.        Review of Systems   Constitutional: Negative for activity change, chills, fatigue, fever and unexpected weight change.   Eyes: Negative for discharge, redness and visual disturbance.   Respiratory: Negative for cough, shortness of breath and wheezing.    Cardiovascular: Negative for chest pain and leg swelling.   Gastrointestinal: Negative for abdominal distention, abdominal pain, constipation, diarrhea, nausea and vomiting.   Genitourinary: Negative for decreased urine volume, difficulty urinating, dysuria, flank pain, frequency, hematuria and urgency.   Musculoskeletal: Negative for arthralgias, joint swelling and myalgias.   Skin: Negative for color change and rash.   Neurological: Negative for dizziness and light-headedness.   Psychiatric/Behavioral: Negative for behavioral problems and confusion. The patient is not nervous/anxious.        Objective:      Vitals:    03/12/19 1345   BP: 110/70   Weight: 75.2 kg (165 lb 12.6 oz)   Height: 5' 6" (1.676 m) "     Physical Exam   Constitutional: He is oriented to person, place, and time. He appears well-developed.   HENT:   Head: Normocephalic and atraumatic.   Nose: Nose normal.   Eyes: Conjunctivae are normal. Right eye exhibits no discharge. Left eye exhibits no discharge.   Neck: Normal range of motion. Neck supple. No tracheal deviation present. No thyromegaly present.   Cardiovascular: Normal rate and regular rhythm.    Pulmonary/Chest: Effort normal. No respiratory distress. He has no wheezes.   Abdominal: Soft. He exhibits no distension. There is no hepatosplenomegaly. There is no tenderness. There is no CVA tenderness. No hernia.   Genitourinary:   Genitourinary Comments: Arias draining yellow urine   Musculoskeletal: Normal range of motion. He exhibits no edema.   Neurological: He is alert and oriented to person, place, and time.   Skin: Skin is warm and dry. No rash noted. No erythema.     Psychiatric: He has a normal mood and affect. His behavior is normal. Judgment normal.       Urine dipstick shows not done--arias in place    Voiding trial: 240cc instilled, 100cc voided    Assessment:       1. Urinary retention    2. Prostate cancer    3. Gross hematuria    4. Bilateral hydronephrosis          Plan:       1. Urinary retention  - Voiding Trial  2/12/19--unsuccessful. Arias replaced  - Voiding Trial today--cautiously successful  -CIC teaching  -He will rtc in the AM for PVR check with nurse  -Continue Flomax    2. Prostate cancer  -Trelstar injection 1/28/19  -Casodex  -RTC in 1 month with labs/Trelstar    3. Gross hematuria  -Cystoscopy with retrograde pyelogram Monday 1/14/2019  -No tumors  -Prostate abnormal    4. Bilateral hydronephrosis  -s/p bilateral stent placement on 1/14/19  -Cr 3/7/19--1.1 (improved)  -Plan for cystoscopy with bilateral RPG/bilateral stent exchange on Monday 4/15/19 with Dr. Bardales          Follow-up in about 7 weeks (around 4/29/2019) for Review labs/Trelstar. He will rtc tomorrow  for PVR check

## 2019-03-13 ENCOUNTER — CLINICAL SUPPORT (OUTPATIENT)
Dept: UROLOGY | Facility: CLINIC | Age: 79
DRG: 698 | End: 2019-03-13
Payer: MEDICARE

## 2019-03-13 ENCOUNTER — TELEPHONE (OUTPATIENT)
Dept: UROLOGY | Facility: CLINIC | Age: 79
End: 2019-03-13

## 2019-03-13 VITALS
BODY MASS INDEX: 26.52 KG/M2 | SYSTOLIC BLOOD PRESSURE: 90 MMHG | HEIGHT: 66 IN | WEIGHT: 165 LBS | TEMPERATURE: 99 F | DIASTOLIC BLOOD PRESSURE: 70 MMHG

## 2019-03-13 DIAGNOSIS — N39.0 URINARY TRACT INFECTION ASSOCIATED WITH CATHETERIZATION OF URINARY TRACT, UNSPECIFIED INDWELLING URINARY CATHETER TYPE, SUBSEQUENT ENCOUNTER: ICD-10-CM

## 2019-03-13 DIAGNOSIS — R33.9 RETENTION OF URINE, UNSPECIFIED: Primary | ICD-10-CM

## 2019-03-13 DIAGNOSIS — T83.511D URINARY TRACT INFECTION ASSOCIATED WITH CATHETERIZATION OF URINARY TRACT, UNSPECIFIED INDWELLING URINARY CATHETER TYPE, SUBSEQUENT ENCOUNTER: ICD-10-CM

## 2019-03-13 PROCEDURE — 51702 PR INSERTION OF TEMPORARY INDWELLING BLADDER CATHETER, SIMPLE: ICD-10-PCS | Mod: S$PBB,,, | Performed by: NURSE PRACTITIONER

## 2019-03-13 PROCEDURE — 99999 PR PBB SHADOW E&M-EST. PATIENT-LVL III: CPT | Mod: PBBFAC,,,

## 2019-03-13 PROCEDURE — 51702 INSERT TEMP BLADDER CATH: CPT | Mod: PBBFAC

## 2019-03-13 PROCEDURE — 87088 URINE BACTERIA CULTURE: CPT

## 2019-03-13 PROCEDURE — 99213 OFFICE O/P EST LOW 20 MIN: CPT | Mod: PBBFAC,25

## 2019-03-13 PROCEDURE — 51702 INSERT TEMP BLADDER CATH: CPT | Mod: S$PBB,,, | Performed by: NURSE PRACTITIONER

## 2019-03-13 PROCEDURE — 87086 URINE CULTURE/COLONY COUNT: CPT

## 2019-03-13 PROCEDURE — 99999 PR PBB SHADOW E&M-EST. PATIENT-LVL III: ICD-10-PCS | Mod: PBBFAC,,,

## 2019-03-13 PROCEDURE — 87186 SC STD MICRODIL/AGAR DIL: CPT

## 2019-03-13 PROCEDURE — 87077 CULTURE AEROBIC IDENTIFY: CPT

## 2019-03-13 NOTE — PROGRESS NOTES
Patient presented today with abd pain and c/o of possible urinary retention. Bladder scan revealed 186 cc of urine present.  Patient request that a catheter be placed in d/t CIC being too painful . NP gave order to place catheter and return in one week for VOT. 16 fr catheter placed in sterile fashion and UCX sample obtained. No distressed noted. Apt made for follow up.

## 2019-03-13 NOTE — TELEPHONE ENCOUNTER
Spoke to pt's wife she states pt's legs are swollen an pt hasnt urinated since 4am an only 60cc an than stated at 8:30 he urinated another 60cc. When asked if pt did cic last night it was very unclear  stated she thinks so but pt's prostate is so enlarge not sure if was done correctly. Ms.matherne jacobs stated she couldn't get pt in the car do to the swelling of his legs an not being able to void. I advised  if she feels pt is in distress she should call an ambulance an have them bring him to the er for evaluation because we can not evaluate pt over the phone.  states she understands but  got on the phone an states he can make it to the office. I advised pt to come to office but pt may still need to go to the er for eval. Both pt an wife state they understand.- vicki

## 2019-03-14 ENCOUNTER — HOSPITAL ENCOUNTER (INPATIENT)
Facility: HOSPITAL | Age: 79
LOS: 4 days | Discharge: HOME HEALTH | DRG: 698 | End: 2019-03-18
Attending: INTERNAL MEDICINE | Admitting: INTERNAL MEDICINE
Payer: MEDICARE

## 2019-03-14 DIAGNOSIS — N39.0 ENTEROCOCCUS UTI: ICD-10-CM

## 2019-03-14 DIAGNOSIS — A41.9 SEPSIS: ICD-10-CM

## 2019-03-14 DIAGNOSIS — K59.01 SLOW TRANSIT CONSTIPATION: ICD-10-CM

## 2019-03-14 DIAGNOSIS — A41.9 SEPSIS, DUE TO UNSPECIFIED ORGANISM: Primary | ICD-10-CM

## 2019-03-14 DIAGNOSIS — R53.81 DEBILITY: ICD-10-CM

## 2019-03-14 DIAGNOSIS — C61 STAGE IV ADENOCARCINOMA OF PROSTATE: ICD-10-CM

## 2019-03-14 DIAGNOSIS — B95.2 ENTEROCOCCUS UTI: ICD-10-CM

## 2019-03-14 LAB
ALBUMIN SERPL BCP-MCNC: 3.1 G/DL
ALP SERPL-CCNC: 77 U/L
ALT SERPL W/O P-5'-P-CCNC: 26 U/L
ANION GAP SERPL CALC-SCNC: 10 MMOL/L
AST SERPL-CCNC: 48 U/L
BASOPHILS # BLD AUTO: 0.01 K/UL
BASOPHILS NFR BLD: 0.1 %
BILIRUB SERPL-MCNC: 0.9 MG/DL
BUN SERPL-MCNC: 30 MG/DL
CALCIUM SERPL-MCNC: 9.2 MG/DL
CHLORIDE SERPL-SCNC: 101 MMOL/L
CO2 SERPL-SCNC: 25 MMOL/L
CREAT SERPL-MCNC: 1.5 MG/DL
DIFFERENTIAL METHOD: ABNORMAL
EOSINOPHIL # BLD AUTO: 0 K/UL
EOSINOPHIL NFR BLD: 0 %
ERYTHROCYTE [DISTWIDTH] IN BLOOD BY AUTOMATED COUNT: 15.2 %
EST. GFR  (AFRICAN AMERICAN): 51 ML/MIN/1.73 M^2
EST. GFR  (NON AFRICAN AMERICAN): 44 ML/MIN/1.73 M^2
GLUCOSE SERPL-MCNC: 107 MG/DL
HCT VFR BLD AUTO: 33.6 %
HGB BLD-MCNC: 10.8 G/DL
LYMPHOCYTES # BLD AUTO: 0.6 K/UL
LYMPHOCYTES NFR BLD: 4.4 %
MAGNESIUM SERPL-MCNC: 1.9 MG/DL
MCH RBC QN AUTO: 28.6 PG
MCHC RBC AUTO-ENTMCNC: 32.1 G/DL
MCV RBC AUTO: 89 FL
MONOCYTES # BLD AUTO: 1 K/UL
MONOCYTES NFR BLD: 7.8 %
NEUTROPHILS # BLD AUTO: 11.4 K/UL
NEUTROPHILS NFR BLD: 87.7 %
PLATELET # BLD AUTO: 188 K/UL
PMV BLD AUTO: 9.9 FL
POTASSIUM SERPL-SCNC: 4.1 MMOL/L
PROT SERPL-MCNC: 6.5 G/DL
RBC # BLD AUTO: 3.78 M/UL
SODIUM SERPL-SCNC: 136 MMOL/L
WBC # BLD AUTO: 12.94 K/UL

## 2019-03-14 PROCEDURE — 25000003 PHARM REV CODE 250: Performed by: INTERNAL MEDICINE

## 2019-03-14 PROCEDURE — 63600175 PHARM REV CODE 636 W HCPCS: Performed by: INTERNAL MEDICINE

## 2019-03-14 PROCEDURE — 87040 BLOOD CULTURE FOR BACTERIA: CPT

## 2019-03-14 PROCEDURE — 80053 COMPREHEN METABOLIC PANEL: CPT

## 2019-03-14 PROCEDURE — 11000001 HC ACUTE MED/SURG PRIVATE ROOM

## 2019-03-14 PROCEDURE — 83735 ASSAY OF MAGNESIUM: CPT

## 2019-03-14 PROCEDURE — 85025 COMPLETE CBC W/AUTO DIFF WBC: CPT

## 2019-03-14 RX ORDER — ENOXAPARIN SODIUM 100 MG/ML
30 INJECTION SUBCUTANEOUS EVERY 24 HOURS
Status: DISCONTINUED | OUTPATIENT
Start: 2019-03-14 | End: 2019-03-18 | Stop reason: HOSPADM

## 2019-03-14 RX ORDER — SODIUM CHLORIDE 0.9 % (FLUSH) 0.9 %
5 SYRINGE (ML) INJECTION
Status: DISCONTINUED | OUTPATIENT
Start: 2019-03-14 | End: 2019-03-18 | Stop reason: HOSPADM

## 2019-03-14 RX ORDER — VANCOMYCIN HCL IN 5 % DEXTROSE 1G/250ML
1000 PLASTIC BAG, INJECTION (ML) INTRAVENOUS
Status: DISCONTINUED | OUTPATIENT
Start: 2019-03-15 | End: 2019-03-18 | Stop reason: HOSPADM

## 2019-03-14 RX ORDER — HYDROCODONE BITARTRATE AND ACETAMINOPHEN 5; 325 MG/1; MG/1
1 TABLET ORAL EVERY 6 HOURS PRN
Status: DISCONTINUED | OUTPATIENT
Start: 2019-03-14 | End: 2019-03-18 | Stop reason: HOSPADM

## 2019-03-14 RX ORDER — SODIUM CHLORIDE 9 MG/ML
INJECTION, SOLUTION INTRAVENOUS CONTINUOUS
Status: DISCONTINUED | OUTPATIENT
Start: 2019-03-14 | End: 2019-03-18 | Stop reason: HOSPADM

## 2019-03-14 RX ADMIN — ENOXAPARIN SODIUM 30 MG: 100 INJECTION SUBCUTANEOUS at 06:03

## 2019-03-14 RX ADMIN — SODIUM CHLORIDE: 0.9 INJECTION, SOLUTION INTRAVENOUS at 09:03

## 2019-03-14 NOTE — NURSING
Patient remains free from falls and injury. No distress noted. VSS. No complaint of pain, n/v, diarrhea, or SOB. Will continue to monitor, will continue with plan of care.Patient oriented to room. Blue folder and white board explained. Questions encouraged and answered. Patient verbalized understanding. Bed in low position and locked. Call light in reach. Bed alarm set. No distress noted. Will continue to monitor, will continue with plan of care.

## 2019-03-15 ENCOUNTER — TELEPHONE (OUTPATIENT)
Dept: UROLOGY | Facility: CLINIC | Age: 79
End: 2019-03-15

## 2019-03-15 PROBLEM — E44.0 MODERATE MALNUTRITION: Status: ACTIVE | Noted: 2019-03-15

## 2019-03-15 PROBLEM — N39.0 UTI (URINARY TRACT INFECTION) DUE TO ENTEROCOCCUS: Status: ACTIVE | Noted: 2019-03-15

## 2019-03-15 PROBLEM — B95.2 UTI (URINARY TRACT INFECTION) DUE TO ENTEROCOCCUS: Status: ACTIVE | Noted: 2019-03-15

## 2019-03-15 PROBLEM — A41.81 SEPSIS DUE TO ENTEROCOCCUS: Status: ACTIVE | Noted: 2019-03-15

## 2019-03-15 PROBLEM — N18.30 CKD (CHRONIC KIDNEY DISEASE) STAGE 3, GFR 30-59 ML/MIN: Status: ACTIVE | Noted: 2019-03-15

## 2019-03-15 LAB — BACTERIA UR CULT: NORMAL

## 2019-03-15 PROCEDURE — 11000001 HC ACUTE MED/SURG PRIVATE ROOM

## 2019-03-15 PROCEDURE — 25000003 PHARM REV CODE 250: Performed by: INTERNAL MEDICINE

## 2019-03-15 PROCEDURE — 63600175 PHARM REV CODE 636 W HCPCS: Performed by: INTERNAL MEDICINE

## 2019-03-15 RX ORDER — TAMSULOSIN HYDROCHLORIDE 0.4 MG/1
0.4 CAPSULE ORAL DAILY
Status: DISCONTINUED | OUTPATIENT
Start: 2019-03-16 | End: 2019-03-18 | Stop reason: HOSPADM

## 2019-03-15 RX ORDER — BICALUTAMIDE 50 MG/1
50 TABLET, FILM COATED ORAL DAILY
Status: DISCONTINUED | OUTPATIENT
Start: 2019-03-16 | End: 2019-03-18 | Stop reason: HOSPADM

## 2019-03-15 RX ADMIN — VANCOMYCIN HYDROCHLORIDE 1000 MG: 1 INJECTION, POWDER, FOR SOLUTION INTRAVENOUS at 10:03

## 2019-03-15 RX ADMIN — ENOXAPARIN SODIUM 30 MG: 100 INJECTION SUBCUTANEOUS at 05:03

## 2019-03-15 NOTE — TELEPHONE ENCOUNTER
Recent UCx positive for UTI. Patient currently admitted.  IV abx per primary. Per MAR patient currently receiving IV Vancomycin (sensitive)

## 2019-03-15 NOTE — ASSESSMENT & PLAN NOTE
Malnutrition in the context of Chronic Illness/Injury     Related to (etiology):  Decreased appetite/Cancer     Signs and Symptoms (as evidenced by):     Body Fat Depletion: mild depletion of orbitals, triceps and thoracic and lumbar region   Muscle Mass Depletion: moderate depletion of temples, clavicle region, scapular region, interosseous muscle and lower extremities   Fluid Accumulation: mild     Interventions/Recommendations (treatment strategy):  Commercial Beverage  Nutrition Education     Nutrition Diagnosis Status:  New

## 2019-03-15 NOTE — CONSULTS
Consult for skin breakdown to sacrum POA.   A 78 year old male admitted 3/14/19 from home with SIRS and hypovolemia.   3/13 WBC 21.77 Hgb 11.0 Hct 35.2 Alb 3.4  Skyler score- 16  On Isoflex mattress; bed mobility with minimal assistance  Assessment:  Sacral pressure injury- 3 mm area of DTI at coccyx with erythema surrounding 5 cm around site of DTI  Aquacel Pro Foam Sacral dressing in place.   Treatment:  Continue local wound care with Aquacel foam dressing and offload pressure. Instructed patient on pressure shifts and use of air cushion to offload pressure. Provided patient with air cushion for chair.

## 2019-03-15 NOTE — CONSULTS
"  [facility] [place]  Consult Note    SUMMARY     Recommendations    1. Boost plus bid (chocolate)  2. Honor preferences as able   3. Provided diet education; RD to monitor    Goals: Meet > 85% EEN daily  Nutrition Goal Status: new  Communication of RD Recs: reviewed with RN(POC)    Reason for Assessment    Reason For Assessment: consult  Diagnosis: (sepsis)  Relevant Medical History: no H/P on record yet.  Interdisciplinary Rounds: did not attend    General Information Comments: Pt reports UBW ~ 160#, however stated his primary care MD had recently begun to tell him to focus on putting weight on. Reports decreased appetite; pt has been utilizing ensure at home (1can/day). NFPE 3/15: moderate loss of LBM, mild losses of fat mass. Pt and wife receptive to high kcal/high protein diet education; provided handouts and f/u resources. Pt willing to drink chocolate boost. Reports he does not eat pork, would like plain yogurt and whole milk added to trays.    Nutrition Discharge Planning: Reg diet with oral supplements to meet estimated needs.    Nutrition Risk Screen    Nutrition Risk Screen: no indicators present    Nutrition/Diet History    Spiritual, Cultural Beliefs, Uatsdin Practices, Values that Affect Care: no  Food Allergies: NKFA    Anthropometrics    Temp: 99.8 °F (37.7 °C)  Height Method: Stated  Height: 5' 9" (175.3 cm)  Height (inches): 69 in  Weight Method: Bed Scale  Weight: 77.8 kg (171 lb 8.3 oz)(noted edema)  Weight (lb): 171.52 lb  Ideal Body Weight (IBW), Male: 160 lb  % Ideal Body Weight, Male (lb): 107.2 lb  BMI (Calculated): 25.4  BMI Grade: 25 - 29.9 - overweight       Lab/Procedures/Meds    Pertinent Labs Reviewed: reviewed  Pertinent Labs Comments: alb 3.1; GFR 44; Cr 1.5  Pertinent Medications Reviewed: reviewed  Pertinent Medications Comments: IVF, abx    Estimated/Assessed Needs    Weight Used For Calorie Calculations: 77.8 kg (171 lb 8.3 oz)  Energy Calorie " Requirements (kcal): 1860 kcal (x 1.25)  Energy Need Method: [place]  Protein Requirements: 95g (1.2g/kg)  Weight Used For Protein Calculations: 77.8 kg (171 lb 8.3 oz)     Estimated Fluid Requirement Method: RDA Method  RDA Method (mL): 1860       Nutrition Prescription Ordered    Current Diet Order: Reg    Evaluation of Received Nutrient/Fluid Intake    IV Fluid (mL): 100(ml/hr of NS)  I/O: reviewed; good UOP  Energy Calories Required: not meeting needs  Protein Required: not meeting needs  Fluid Required: (per MD)  Comments: LBM: 3/13; Sacral DTI  Tolerance: tolerating  % Intake of Estimated Energy Needs: 50 - 75 %  % Meal Intake: 50 %    Nutrition Risk    Level of Risk/Frequency of Follow-up: (1 x week)     Assessment and Plan    Malnutrition in the context of Chronic Illness/Injury    Related to (etiology):  Decreased appetite    Signs and Symptoms (as evidenced by):    Body Fat Depletion: mild depletion of orbitals, triceps and thoracic and lumbar region   Muscle Mass Depletion: moderate depletion of temples, clavicle region, scapular region, interosseous muscle and lower extremities   Fluid Accumulation: mild    Interventions/Recommendations (treatment strategy):  Commercial Beverage  Nutrition Education    Nutrition Diagnosis Status:  New       Monitor and Evaluation    Food and Nutrient Intake: energy intake, food and beverage intake  Food and Nutrient Adminstration: diet order  Knowledge/Beliefs/Attitudes: food and nutrition knowledge/skill  Physical Activity and Function: nutrition-related ADLs and IADLs  Anthropometric Measurements: weight, weight change  Biochemical Data, Medical Tests and Procedures: electrolyte and renal panel  Nutrition-Focused Physical Findings: overall appearance     Malnutrition Assessment  Malnutrition Type: chronic illness              Orbital Region (Subcutaneous Fat Loss): mild depletion  Upper Arm Region (Subcutaneous Fat Loss): mild depletion  Thoracic and Lumbar  Region: mild depletion   [place] Region (Muscle Loss): moderate depletion  Clavicle Bone Region (Muscle Loss): moderate depletion  Clavicle and Acromion Bone Region (Muscle Loss): moderate depletion  Scapular Bone Region (Muscle Loss): moderate depletion  Dorsal Hand (Muscle Loss): moderate depletion  Patellar Region (Muscle Loss): mild depletion(+edema)  Anterior Thigh Region (Muscle Loss): mild depletion(+edema)  Posterior Calf Region (Muscle Loss): mild depletion(+ edema)   Edema (Fluid Accumulation): 2-->mild   Subcutaneous Fat Loss (Final Summary): mild protein-calorie malnutrition  Muscle Loss Evaluation (Final Summary): moderate protein-calorie malnutrition         Nutrition Follow-Up    RD Follow-up?: Yes

## 2019-03-15 NOTE — PLAN OF CARE
Problem: Adult Inpatient Plan of Care  Goal: Plan of Care Review  Outcome: Ongoing (interventions implemented as appropriate)   03/15/19 0732   Plan of Care Review   Plan of Care Reviewed With patient;spouse;daughter   Progress improving       Assumed care. AAOx4. NS@100 continuous. Unblanchable area on sacrum/coccyx was noted that patient arrived on the floor with. Mepilex dressing was placed and patient was turned q2h. Education for skin integrity and repositioning was performed with the patient and the spouse/ daughter at bedside. Pt on VANC. Very pleasant. 22g IV was placed on the right forearm. Left IV was D/C. Will continue to monitor.

## 2019-03-15 NOTE — PLAN OF CARE
"   03/15/19 1517   Discharge Assessment   Assessment Type Discharge Planning Assessment   Confirmed/corrected address and phone number on facesheet? Yes   Assessment information obtained from? Other  ((wife) Soha)   Prior to hospitilization cognitive status: Alert/Oriented   Prior to hospitalization functional status: Needs Assistance   Current cognitive status: Alert/Oriented   Current Functional Status: Needs Assistance   Lives With spouse   Able to Return to Prior Arrangements (TBD)   Is patient able to care for self after discharge? No   Patient's perception of discharge disposition skilled nursing facility   Readmission Within the Last 30 Days no previous admission in last 30 days   Patient currently being followed by outpatient case management? No  (Wife states pt had [facility], not pleased with service, would prefer to work with [facility]  if HH is recommended)   Patient currently receives any other outside agency services? No   Equipment Currently Used at Home walker, rolling;cane, straight   Do you have any problems affording any of your prescribed medications? No   Is the patient taking medications as prescribed? yes   Does the patient have transportation home? (Wife states she is concerned about patient's transportation to appts if discharged Home, would like to inquire about help getting patient out of bed to appts.)   Does the patient receive services at the [facility]? No   Discharge Plan A Home Health   Discharge Plan B Skilled Nursing Facility   DME Needed Upon Discharge  (TBD)     Prefers evening appointments    Patient's wife would like to inquire about assistance out of bed for appointments and also transportation to appts if patient is discharge to home.    TN explained role of , "Help Manage Care at Home".  Blue folder placed near patient's bedside, "My Health packet', reviewed pink and blue sticker on folder, patient's wife voiced understanding.      [id number] " - [facility] - [address] AT [facility] [address]  [address]  [place] [address]  Phone: [phone] Fax: [phone]

## 2019-03-15 NOTE — NURSING
Pt also has an indwelling long term arisa cath he came in with with leg strap. Monitor edema and creases/tubing pressing against his legs.

## 2019-03-15 NOTE — UM SECONDARY REVIEW
Physician Advisor External    Level of Care Issue     Case not meeting IP criteria    Approved Inpatient     IP determination by Dr. Samreen Marley at EHR on 3/15

## 2019-03-15 NOTE — H&P
[facility]  Hematology/Oncology  H&P    Patient Name: Obed Sood  MRN: [id number]  Admission Date: 3/14/2019  Code Status: Full Code   Attending Provider: Enrique Barker MD  Primary Care Physician: Enrique Barker MD  Principal Problem:<principal problem not specified>    Subjective:     HPI:     Mr. Sood is a pleasant 77 YO gentleman with most recent dx of metastatic adenocarcinoma of prostate. He was started on androgen deprivation therapy. Pt has chronic indwelling Escalante cath and underwent removal few days ago. Pt could not urinate. He attempted self cath without success and went to urology clinic and underwent Escalante insertion.  He began to have moderate to severe weakness and intermittent chills along with difficulty ambulating. Pt was brought to the clinic and found to have sepsis. Pt was started on empiric ABX and IV fluids until urine culture came back + for Enterococcus species. Pt had hypotension and low grade temp with some changes in mental status and difficulty ambulating. Pt was admitted to O[facility] directly from my office for severe sepsis and bacterial UTI.     Pt was given one dose of IV Vancomycin th office. Since admission, feeling little better. Appetite little better. Had BM today without medication.           Oncology Treatment Plan:   [No treatment plan]    Medications:  Continuous Infusions:   sodium chloride 0.9% 75 mL/hr at 03/15/19 0648     Scheduled Meds:   enoxaparin  30 mg Subcutaneous Daily    vancomycin (VANCOCIN) IVPB  1,000 mg Intravenous Q24H     PRN Meds:HYDROcodone-acetaminophen, sodium chloride 0.9%     Review of patient's allergies indicates:  No Known Allergies     Past Medical History:   Diagnosis Date    MIAN (acute kidney injury) 09/10/2018    BPH (benign prostatic hyperplasia)     Edema 11/02/2018     Past Surgical History:   Procedure Laterality Date    BIOPSY, PROSTATE N/A 1/14/2019    Performed by [facility] at [facility]     CYSTOSCOPY, WITH RETROGRADE PYELOGRAM AND BILATERAL URETERAL STENT INSERTION Bilateral 2019    Performed [facility] at [facility] OR    HERNIA REPAIR N/A     umbilical    MULTIPLE TOOTH EXTRACTIONS      TONSILLECTOMY Bilateral      Family History     Problem Relation (Age of Onset)    No Known Problems Mother, Father, Sister, Brother        Tobacco Use    Smoking status: Former Smoker     Packs/day: 0.50     Start date: 1953     Last attempt to quit: 1978     Years since quittin.5    Smokeless tobacco: Former User   Substance and Sexual Activity    Alcohol use: No     Frequency: Never    Drug use: No    Sexual activity: Yes     Partners: Female     Birth control/protection: None       Review of Systems     Constitutional: Mod fatigue and low appetite   Eyes: no visual changes   ENT: no nasal congestion. Denies sore throat with odynophagia   Respiratory: no sob   Cardiovascular: no chest pain or palpitations   Gastrointestinal: no nausea, vomiting or abdominal pain. Normal bowl habits   Hematologic/Lymphatic: denies hematuria  Musculoskeletal: mod back pain   Neurological: intermittent confusion   Skin: No rashes or lesions  Psych: Denies any anxiety    Objective:     Vital Signs (Most Recent):  Temp: 99.8 °F (37.7 °C) (03/15/19 0726)  Pulse: 79 (03/15/19 0726)  Resp: 17 (03/15/19 0726)  BP: (!) 116/56 (03/15/19 0726)  SpO2: 95 % (03/15/19 0726) Vital Signs (24h Range):  Temp:  [99.1 °F (37.3 °C)-99.8 °F (37.7 °C)] 99.8 °F (37.7 °C)  Pulse:  [79-96] 79  Resp:  [16-18] 17  SpO2:  [94 %-96 %] 95 %  BP: (116-132)/(56-60) 116/56     Weight: 77.8 kg (171 lb 8.3 oz)  Body mass index is 25.33 kg/m².  Body surface area is 1.95 meters squared.      Intake/Output Summary (Last 24 hours) at 3/15/2019 1015  Last data filed at 3/15/2019 0643  Gross per 24 hour   Intake 235 ml   Output 1175 ml   Net -940 ml       Physical Exam    General: NAD, in bed. Wife and nursing staff in the room    Head: normocephalic, atraumatic   Eyes: conjunctiva slightly pale  Throat: No erythema or post nasal discharge   Neck: supple, no LAD   Lungs: Decreased BS at the bases   Heart: S1, S2, RRR   Abdomen: + BS x 4 quadrants, slightly distended  : Escalante with yellow urine  Extremities: fabien leg edema   Skin: abdominal wall edema   MS: ROM limited fabien   Neuro: A&O x 2  Psy: calm       Significant Labs:   CBC:   Recent Labs   Lab 03/13/19  1640 03/14/19  1708   WBC 21.77* 12.94*   HGB 11.0* 10.8*   HCT 35.2* 33.6*    188   , CMP:   Recent Labs   Lab 03/13/19  1640 03/14/19  1708    136   K 4.5 4.1    101   CO2 26 25   * 107   BUN 27* 30*   CREATININE 1.5* 1.5*   CALCIUM 9.7 9.2   PROT 6.9 6.5   ALBUMIN 3.4* 3.1*   BILITOT 1.0 0.9   ALKPHOS 60 77   AST 20 48*   ALT 9* 26   ANIONGAP 8 10   EGFRNONAA 44* 44*   , Coagulation: No results for input(s): PT, INR, APTT in the last 48 hours., Tumor Markers: No results for input(s): PSA, CEA, , AFPTM, VY3115,  in the last 48 hours.    Invalid input(s): ALGTM, Uric Acid No results for input(s): URICACID in the last 48 hours., Urine Studies:   Recent Labs   Lab 03/13/19  1917   COLORU Yellow   APPEARANCEUA Cloudy*   PHUR 5.0   SPECGRAV 1.015   PROTEINUA 2+*   GLUCUA Negative   KETONESU Negative   BILIRUBINUA Negative   OCCULTUA 3+*   NITRITE Positive*   UROBILINOGEN Negative   LEUKOCYTESUR 3+*   RBCUA 15*   WBCUA >100*   BACTERIA Moderate*   SQUAMEPITHEL 4   HYALINECASTS 0    and All pertinent labs from the last 24 hours have been reviewed.    Diagnostic Results:  Urine culture   Order: [id number]   Status:  Preliminary result   Visible to patient:  No (Not Released) Next appt:  03/19/2019 at 09:00 AM in Urology (Alisa Andres NP) Dx:  Urinary tract infection associated wi...   Specimen Information: Urine, Clean Catch        Component 2d ago   Urine Culture, Routine ENTEROCOCCUS SPECIES   > 100,000 cfu/ml   Identification and susceptibility  pending                   Assessment/Plan:     Active Hospital Problems    Diagnosis  POA    *Sepsis [A41.9]- severe with intermittent confusion and severe weakness  - IV Vancomycin  - IV fluid  - f/u speciation and sensitivity.   Yes    CKD (chronic kidney disease) stage 3, GFR 30-59 ml/min [N18.3]  -iv hydration    Yes    UTI (urinary tract infection) due to Enterococcus [N39.0, B95.2]  - on Vancomycin  - Escalante    Yes    Sepsis due to Enterococcus [A41.81]  -  Yes    Moderate malnutrition [E44.0]  Yes     Malnutrition in the context of Chronic Illness/Injury     Related to (etiology):  Decreased appetite     Signs and Symptoms (as evidenced by):     Body Fat Depletion: mild depletion of orbitals, triceps and thoracic and lumbar region   Muscle Mass Depletion: moderate depletion of temples, clavicle region, scapular region, interosseous muscle and lower extremities   Fluid Accumulation: mild     Interventions/Recommendations (treatment strategy):  [employer]  Nutrition Education     Nutrition Diagnosis Status:  New          Stage IV adenocarcinoma of prostate [C61]  - on androgen deprivation therapy     Yes    Slow transit constipation [K59.01]  -had a BM today  - resume home mediation    Yes    Moderate protein-calorie malnutrition [E44.0]  - worsening protein/albumin    Yes    Urinary retention [R33.9]- due to BPH/Prostate CA  - Escalante    Yes    Lymphedema [I89.0]  Yes    Scrotal swelling [N50.89]  Yes    HTN, goal below 140/90 [I10]  - had hypotension with sepsis  - hold off on antihypertensive for now  Yes      Resolved Hospital Problems   No resolved problems to display.     DVT pro: Bhargav Barker M.D  Internal Medicine & Geriatric Medicine  Hematology & Oncology  Palliative Medicine    [address], [address]  [place] [address] [phone] (Office)  [phone] (Fax)

## 2019-03-15 NOTE — PLAN OF CARE
Recommendations     1. Boost plus bid (chocolate)  2. Honor preferences as able   3. Provided diet education; RD to monitor     Goals: Meet > 85% EEN daily  Nutrition Goal Status: new  Communication of RD Recs: reviewed with RN(POC)

## 2019-03-16 LAB
ANION GAP SERPL CALC-SCNC: 3 MMOL/L
BUN SERPL-MCNC: 26 MG/DL
CALCIUM SERPL-MCNC: 8.4 MG/DL
CHLORIDE SERPL-SCNC: 105 MMOL/L
CO2 SERPL-SCNC: 28 MMOL/L
CREAT SERPL-MCNC: 1.3 MG/DL
EST. GFR  (AFRICAN AMERICAN): >60 ML/MIN/1.73 M^2
EST. GFR  (NON AFRICAN AMERICAN): 52 ML/MIN/1.73 M^2
GLUCOSE SERPL-MCNC: 118 MG/DL
POTASSIUM SERPL-SCNC: 3.6 MMOL/L
SODIUM SERPL-SCNC: 136 MMOL/L

## 2019-03-16 PROCEDURE — 80048 BASIC METABOLIC PNL TOTAL CA: CPT

## 2019-03-16 PROCEDURE — 36415 COLL VENOUS BLD VENIPUNCTURE: CPT

## 2019-03-16 PROCEDURE — 63600175 PHARM REV CODE 636 W HCPCS: Performed by: INTERNAL MEDICINE

## 2019-03-16 PROCEDURE — 25000003 PHARM REV CODE 250: Performed by: INTERNAL MEDICINE

## 2019-03-16 PROCEDURE — 11000001 HC ACUTE MED/SURG PRIVATE ROOM

## 2019-03-16 RX ORDER — BISACODYL 10 MG
10 SUPPOSITORY, RECTAL RECTAL DAILY
Status: DISCONTINUED | OUTPATIENT
Start: 2019-03-16 | End: 2019-03-18 | Stop reason: HOSPADM

## 2019-03-16 RX ADMIN — SODIUM CHLORIDE: 0.9 INJECTION, SOLUTION INTRAVENOUS at 03:03

## 2019-03-16 RX ADMIN — ENOXAPARIN SODIUM 30 MG: 100 INJECTION SUBCUTANEOUS at 04:03

## 2019-03-16 RX ADMIN — TAMSULOSIN HYDROCHLORIDE 0.4 MG: 0.4 CAPSULE ORAL at 09:03

## 2019-03-16 RX ADMIN — BISACODYL 10 MG RECTAL SUPPOSITORY 10 MG: at 03:03

## 2019-03-16 RX ADMIN — VANCOMYCIN HYDROCHLORIDE 1000 MG: 1 INJECTION, POWDER, FOR SOLUTION INTRAVENOUS at 09:03

## 2019-03-16 RX ADMIN — BICALUTAMIDE 50 MG: 50 TABLET, FILM COATED ORAL at 09:03

## 2019-03-16 NOTE — NURSING
Pt aaox4. Dressing to sacrum C/D/I. Turned every 2hrs. Wife very involve in pt's plan of care. Dulcolax suppsitiory given, pt had small bm. BLE +2 pitting edema, legs elevated on pillows. Pt denies pain, n/v.Escalante catheter draining matthew yellow urine, no foul odor or cloudiness. Pt has good appetite.call light w/i reach, bed in lowest position.

## 2019-03-16 NOTE — PLAN OF CARE
Problem: Fall Injury Risk  Goal: Absence of Fall and Fall-Related Injury  Outcome: Ongoing (interventions implemented as appropriate)  Monitored. No complaints this shift. Safety precautions maintained. Wife at bedside.

## 2019-03-16 NOTE — PROGRESS NOTES
[facility]  Hematology/Oncology  Progress Note    Patient Name: Obed Sood  Admission Date: 3/14/2019  Hospital Length of Stay: 2 days  Code Status: Full Code     Subjective:     Mr. Sood is a pleasant 79 YO gentleman with most recent dx of metastatic adenocarcinoma of prostate. He was started on androgen deprivation therapy. Pt has chronic indwelling Escalante cath and underwent removal few days ago. Pt could not urinate. He attempted self cath without success and went to urology clinic and underwent Escalante insertion.  He began to have moderate to severe weakness and intermittent chills along with difficulty ambulating. Pt was brought to the clinic and found to have sepsis. Pt was started on empiric ABX and IV fluids until urine culture came back + for Enterococcus species. Pt had hypotension and low grade temp with some changes in mental status and difficulty ambulating. Pt was admitted to [facility] directly from my office for severe sepsis and bacterial UTI.      Pt was given one dose of IV Vancomycin th office. Since admission, feeling little better. Appetite little better. Had BM today without medication.         Interval History:     03/16/19: Denies fever or chills. Appetite improving.       Oncology Treatment Plan:   [No treatment plan]    Medications:  Continuous Infusions:   sodium chloride 0.9% 75 mL/hr at 03/15/19 0648     Scheduled Meds:   bicalutamide  50 mg Oral Daily    enoxaparin  30 mg Subcutaneous Daily    tamsulosin  0.4 mg Oral Daily    vancomycin (VANCOCIN) IVPB  1,000 mg Intravenous Q24H     PRN Meds:HYDROcodone-acetaminophen, sodium chloride 0.9%     Review of Systems     Constitutional: Mod fatigue  Eyes: no visual changes   ENT: no nasal congestion.   Respiratory: no sob   Cardiovascular: no chest pain or palpitations   Gastrointestinal: no nausea, vomiting or abdominal pain.   Hematologic/Lymphatic: denies hematuria  Musculoskeletal: mod back pain. L  Neurological:  intermittent confusion   Skin: No rashes or lesions  Psych: Denies any anxiety      Objective:     Vital Signs (Most Recent):  Temp: 97.6 °F (36.4 °C) (03/16/19 0721)  Pulse: 75 (03/16/19 0721)  Resp: 18 (03/16/19 0721)  BP: (!) 118/59 (03/16/19 0721)  SpO2: 96 % (03/16/19 0721) Vital Signs (24h Range):  Temp:  [97.6 °F (36.4 °C)-99.7 °F (37.6 °C)] 97.6 °F (36.4 °C)  Pulse:  [74-89] 75  Resp:  [17-18] 18  SpO2:  [93 %-97 %] 96 %  BP: (118-144)/(59-64) 118/59     Weight: 77.8 kg (171 lb 8.3 oz)(noted edema)  Body mass index is 25.33 kg/m².  Body surface area is 1.95 meters squared.      Intake/Output Summary (Last 24 hours) at 3/16/2019 1144  Last data filed at 3/16/2019 0900  Gross per 24 hour   Intake 1380 ml   Output 3400 ml   Net -2020 ml       Physical Exam     General: NAD, in bed.  Mrs. Sood in the room    Head: normocephalic, atraumatic   Eyes: conjunctiva slightly pale  Throat: No erythema or post nasal discharge   Neck: supple, no LAD   Lungs: Decreased BS at the bases   Heart: S1, S2, RRR   Abdomen: + BS x 4 quadrants, no sig tenderness   : Escalante with yellow urine  Extremities: fabien leg edema   Skin: abdominal wall edema   MS: ROM limited fabien   Neuro: A&O x 2  Psy: calm     Significant Labs:   CBC:   Recent Labs   Lab 03/14/19  1708   WBC 12.94*   HGB 10.8*   HCT 33.6*      , CMP:   Recent Labs   Lab 03/14/19  1708 03/16/19  1043    136   K 4.1 3.6    105   CO2 25 28    118*   BUN 30* 26*   CREATININE 1.5* 1.3   CALCIUM 9.2 8.4*   PROT 6.5  --    ALBUMIN 3.1*  --    BILITOT 0.9  --    ALKPHOS 77  --    AST 48*  --    ALT 26  --    ANIONGAP 10 3*   EGFRNONAA 44* 52*   , Coagulation: No results for input(s): PT, INR, APTT in the last 48 hours., LDH: No results for input(s): LDHCSF, BFSOURCE in the last 48 hours., Reticulocytes: No results for input(s): RETIC in the last 48 hours., Tumor Markers: No results for input(s): PSA, CEA, , AFPTM, GU4424,  in the last 48  hours.    Invalid input(s): ALGTM, Uric Acid No results for input(s): URICACID in the last 48 hours., Urine Studies: No results for input(s): COLORU, APPEARANCEUA, PHUR, SPECGRAV, PROTEINUA, GLUCUA, KETONESU, BILIRUBINUA, OCCULTUA, NITRITE, UROBILINOGEN, LEUKOCYTESUR, RBCUA, WBCUA, BACTERIA, SQUAMEPITHEL, HYALINECASTS in the last 48 hours.    Invalid input(s): WRIGHTSUR and All pertinent labs from the last 24 hours have been reviewed.    Diagnostic Results:    Urine culture   Order: [id number]   Status:  Final result   Visible to patient:  Yes (Patient Portal) Next appt:  03/19/2019 at 09:00 AM in Urology (Alisa Andres NP) Dx:  Urinary tract infection associated wi...   Specimen Information: Urine, Clean Catch        Component 3d ago   Urine Culture, Routine ENTEROCOCCUS FAECALIS   > 100,000 cfu/ml       Resulting Agency [facility]   Susceptibility      Enterococcus faecalis     CULTURE, URINE     Ampicillin <=2  Sensitive     Nitrofurantoin <=32  Sensitive     Tetracycline >8  Resistant     Vancomycin 2  Sensitive            Linear View                  Assessment/Plan:       Active Hospital Problems     Diagnosis   POA    *Sepsis [A41.9]- severe with intermittent confusion and severe weakness  - IV Vancomycin  - IV fluid  - reviewed speciation and sensitivity- continue Vancomycin       Yes    CKD (chronic kidney disease) stage 3, GFR 30-59 ml/min [N18.3]  -iv hydration  - Cr back to baseline  - will decrease IV fluids       Yes    UTI (urinary tract infection) due to Enterococcus [N39.0, B95.2]  - on Vancomycin  - Escalante      Yes    Sepsis due to Enterococcus [A41.81]  -   Yes    Moderate malnutrition [E44.0]   Yes       Malnutrition in the context of Chronic Illness/Injury     Related to (etiology):  Decreased appetite     Signs and Symptoms (as evidenced by):     Body Fat Depletion: mild depletion of orbitals, triceps and thoracic and lumbar region   Muscle Mass Depletion: moderate depletion of temples,  clavicle region, scapular region, interosseous muscle and lower extremities   Fluid Accumulation: mild     Interventions/Recommendations (treatment strategy):  [employer]  Nutrition Education     Nutrition Diagnosis Status:  New     - states like boost better than ensure           Stage IV adenocarcinoma of prostate [C61]  - on androgen deprivation therapy       Yes    Slow transit constipation [K59.01]  -had a BM today  - Dulcolax sup daily        Yes    Moderate protein-calorie malnutrition [E44.0]  - worsening protein/albumin      Yes    Urinary retention [R33.9]- due to BPH/Prostate CA  - Escalante      Yes    Lymphedema [I89.0]   Yes    Scrotal swelling [N50.89]   Yes    HTN, goal below 140/90 [I10]  - had hypotension with sepsis  - hold off on antihypertensive for now   Yes       Resolved Hospital Problems   No resolved problems to display.        Updated pt and family on plan of care     Enrique Barker M.D  Internal Medicine & Geriatric Medicine  Hematology & Oncology  Palliative Medicine    [address], [address]  [place], [place] [id number] [phone] (Office)  [phone] (Fax)

## 2019-03-17 LAB
ALBUMIN SERPL BCP-MCNC: 2.2 G/DL
ALP SERPL-CCNC: 237 U/L
ALT SERPL W/O P-5'-P-CCNC: 88 U/L
ANION GAP SERPL CALC-SCNC: 6 MMOL/L
AST SERPL-CCNC: 81 U/L
BASOPHILS # BLD AUTO: 0.04 K/UL
BASOPHILS NFR BLD: 0.4 %
BILIRUB SERPL-MCNC: 0.6 MG/DL
BUN SERPL-MCNC: 24 MG/DL
CALCIUM SERPL-MCNC: 8.2 MG/DL
CHLORIDE SERPL-SCNC: 106 MMOL/L
CO2 SERPL-SCNC: 23 MMOL/L
CREAT SERPL-MCNC: 1.2 MG/DL
DIFFERENTIAL METHOD: ABNORMAL
EOSINOPHIL # BLD AUTO: 0.2 K/UL
EOSINOPHIL NFR BLD: 2.3 %
ERYTHROCYTE [DISTWIDTH] IN BLOOD BY AUTOMATED COUNT: 14.7 %
EST. GFR  (AFRICAN AMERICAN): >60 ML/MIN/1.73 M^2
EST. GFR  (NON AFRICAN AMERICAN): 58 ML/MIN/1.73 M^2
GLUCOSE SERPL-MCNC: 103 MG/DL
HCT VFR BLD AUTO: 32.6 %
HGB BLD-MCNC: 10.5 G/DL
LYMPHOCYTES # BLD AUTO: 1.2 K/UL
LYMPHOCYTES NFR BLD: 12.2 %
MCH RBC QN AUTO: 28.2 PG
MCHC RBC AUTO-ENTMCNC: 32.2 G/DL
MCV RBC AUTO: 88 FL
MONOCYTES # BLD AUTO: 1.1 K/UL
MONOCYTES NFR BLD: 11 %
NEUTROPHILS # BLD AUTO: 7.3 K/UL
NEUTROPHILS NFR BLD: 74.1 %
PLATELET # BLD AUTO: 224 K/UL
PMV BLD AUTO: 10.7 FL
POTASSIUM SERPL-SCNC: 3.6 MMOL/L
PROT SERPL-MCNC: 5.4 G/DL
RBC # BLD AUTO: 3.72 M/UL
SODIUM SERPL-SCNC: 135 MMOL/L
WBC # BLD AUTO: 9.89 K/UL

## 2019-03-17 PROCEDURE — 80053 COMPREHEN METABOLIC PANEL: CPT

## 2019-03-17 PROCEDURE — 85025 COMPLETE CBC W/AUTO DIFF WBC: CPT

## 2019-03-17 PROCEDURE — 97166 OT EVAL MOD COMPLEX 45 MIN: CPT

## 2019-03-17 PROCEDURE — 63600175 PHARM REV CODE 636 W HCPCS: Performed by: INTERNAL MEDICINE

## 2019-03-17 PROCEDURE — 97535 SELF CARE MNGMENT TRAINING: CPT

## 2019-03-17 PROCEDURE — 36415 COLL VENOUS BLD VENIPUNCTURE: CPT

## 2019-03-17 PROCEDURE — 11000001 HC ACUTE MED/SURG PRIVATE ROOM

## 2019-03-17 PROCEDURE — 25000003 PHARM REV CODE 250: Performed by: INTERNAL MEDICINE

## 2019-03-17 RX ADMIN — ENOXAPARIN SODIUM 30 MG: 100 INJECTION SUBCUTANEOUS at 06:03

## 2019-03-17 RX ADMIN — BICALUTAMIDE 50 MG: 50 TABLET, FILM COATED ORAL at 09:03

## 2019-03-17 RX ADMIN — BISACODYL 10 MG RECTAL SUPPOSITORY 10 MG: at 09:03

## 2019-03-17 RX ADMIN — TAMSULOSIN HYDROCHLORIDE 0.4 MG: 0.4 CAPSULE ORAL at 09:03

## 2019-03-17 RX ADMIN — VANCOMYCIN HYDROCHLORIDE 1000 MG: 1 INJECTION, POWDER, FOR SOLUTION INTRAVENOUS at 09:03

## 2019-03-17 RX ADMIN — SODIUM CHLORIDE 1000 ML: 0.9 INJECTION, SOLUTION INTRAVENOUS at 03:03

## 2019-03-17 NOTE — PLAN OF CARE
Problem: Occupational Therapy Goal  Goal: Occupational Therapy Goal  Goals to be met by: 3/31/19    Patient will increase functional independence with ADLs by performing:    UE Dressing with Stand-by Assistance.  Grooming while standing at sink with Stand-by Assistance.  Sitting at edge of bed x15 minutes with Supervision.  Supine to sit with Supervision.  Step transfer with Stand-by Assistance  Toilet transfer to toilet with Stand-by Assistance.  Upper extremity exercise program x15 reps per handout, with assistance as needed.    Outcome: Ongoing (interventions implemented as appropriate)  Patient will benefit from OT to address functional deficits.    Comments: The patient will benefit from  OT. The patient has DME at home. The patient's spouse is requesting a gait belt.

## 2019-03-17 NOTE — NURSING
Pt aaox4 no falls or injuries. Foam dressing applied to sacrum. Pt had one small bm during am shift. Escalante cath draining clear yellow urine. Denies pain, n/v. +1 pitting edema to RLE, +2LLE. Encouraged pt and wife to keep legs elevated. Iv fluids infusing, call light w/i reach, bed in lowest position.

## 2019-03-17 NOTE — PROGRESS NOTES
[facility]  Hematology/Oncology  Progress Note    Patient Name: Obed Sood  Admission Date: 3/14/2019  Hospital Length of Stay: 3 days  Code Status: Full Code     Subjective:     Mr. Sood is a pleasant 77 YO gentleman with most recent dx of metastatic adenocarcinoma of prostate. He was started on androgen deprivation therapy. Pt has chronic indwelling Escalante cath and underwent removal few days ago. Pt could not urinate. He attempted self cath without success and went to urology clinic and underwent Escalante insertion.  He began to have moderate to severe weakness and intermittent chills along with difficulty ambulating. Pt was brought to the clinic and found to have sepsis. Pt was started on empiric ABX and IV fluids until urine culture came back + for Enterococcus species. Pt had hypotension and low grade temp with some changes in mental status and difficulty ambulating. Pt was admitted to [facility] directly from my office for severe sepsis and bacterial UTI.      Pt was given one dose of IV Vancomycin th office. Since admission, feeling little better. Appetite little better. Had BM today without medication.         Interval History:     03/17/19: No fever, having BM.   03/16/19: Denies fever or chills. Appetite improving.       Oncology Treatment Plan:   [No treatment plan]    Medications:  Continuous Infusions:   sodium chloride 0.9% 1,000 mL (03/17/19 0348)     Scheduled Meds:   bicalutamide  50 mg Oral Daily    bisacodyl  10 mg Rectal Daily    enoxaparin  30 mg Subcutaneous Daily    tamsulosin  0.4 mg Oral Daily    vancomycin (VANCOCIN) IVPB  1,000 mg Intravenous Q24H     PRN Meds:HYDROcodone-acetaminophen, sodium chloride 0.9%     Review of Systems     Constitutional: Mod fatigue. Appetite better   Eyes: no visual changes   ENT: no nasal congestion.   Respiratory: no sob   Cardiovascular: no chest pain or palpitations   Gastrointestinal: no nausea, vomiting or abdominal pain.    Hematologic/Lymphatic: denies hematuria  Musculoskeletal: mod back pain. L  Neurological: intermittent confusion   Skin: No rashes or lesions  Psych: Denies any anxiety      Objective:     Vital Signs (Most Recent):  Temp: 97.8 °F (36.6 °C) (03/17/19 0454)  Pulse: 74 (03/17/19 0454)  Resp: 18 (03/17/19 0454)  BP: (!) 143/65 (03/17/19 0454)  SpO2: 95 % (03/17/19 0454) Vital Signs (24h Range):  Temp:  [97.8 °F (36.6 °C)-98.8 °F (37.1 °C)] 97.8 °F (36.6 °C)  Pulse:  [74-91] 74  Resp:  [18] 18  SpO2:  [95 %-96 %] 95 %  BP: (130-145)/(63-65) 143/65     Weight: 77.8 kg (171 lb 8.3 oz)(noted edema)  Body mass index is 25.33 kg/m².  Body surface area is 1.95 meters squared.      Intake/Output Summary (Last 24 hours) at 3/17/2019 0809  Last data filed at 3/17/2019 0600  Gross per 24 hour   Intake 1140 ml   Output 3150 ml   Net -2010 ml       Physical Exam     General: NAD, in bed.    Head: normocephalic, atraumatic   Eyes: conjunctiva slightly pale  Throat: No erythema or post nasal discharge   Neck: supple, no LAD   Lungs: Decreased BS at the bases   Heart: S1, S2, RRR   Abdomen: + BS x 4 quadrants, no sig tenderness   : Escalante with yellow urine  Extremities: fabien leg edema   Skin: abdominal wall edema   MS: ROM limited fabien   Neuro: A&O x 2  Psy: calm     Significant Labs:   CBC:   Recent Labs   Lab 03/17/19  0528   WBC 9.89   HGB 10.5*   HCT 32.6*      , CMP:   Recent Labs   Lab 03/16/19  1043      K 3.6      CO2 28   *   BUN 26*   CREATININE 1.3   CALCIUM 8.4*   ANIONGAP 3*   EGFRNONAA 52*   , Coagulation: No results for input(s): PT, INR, APTT in the last 48 hours., LDH: No results for input(s): LDHCSF, BFSOURCE in the last 48 hours., Reticulocytes: No results for input(s): RETIC in the last 48 hours., Tumor Markers: No results for input(s): PSA, CEA, , AFPTM, [id number],  in the last 48 hours.    Invalid input(s): ALGTM, Uric Acid No results for input(s): URICACID in the last 48 hours.,  Urine Studies: No results for input(s): COLORU, APPEARANCEUA, PHUR, SPECGRAV, PROTEINUA, GLUCUA, KETONESU, BILIRUBINUA, OCCULTUA, NITRITE, UROBILINOGEN, LEUKOCYTESUR, RBCUA, WBCUA, BACTERIA, SQUAMEPITHEL, HYALINECASTS in the last 48 hours.    Invalid input(s): WRIGHTSUR and All pertinent labs from the last 24 hours have been reviewed.    Diagnostic Results:    Urine culture   Order: [id number]   Status:  Final result   Visible to patient:  Yes (Patient Portal) Next appt:  03/19/2019 at 09:00 AM in Urology (Alisa Andres, NP) Dx:  Urinary tract infection associated wi...   Specimen Information: Urine, Clean Catch        Component 3d ago   Urine Culture, Routine ENTEROCOCCUS FAECALIS   > 100,000 cfu/ml       Resulting Agency [facility]   Susceptibility      Enterococcus faecalis     CULTURE, URINE     Ampicillin <=2  Sensitive     Nitrofurantoin <=32  Sensitive     Tetracycline >8  Resistant     Vancomycin 2  Sensitive            Linear View                  Assessment/Plan:              Active Hospital Problems     Diagnosis   POA    *Sepsis [A41.9]- severe with intermittent confusion and severe weakness  - IV Vancomycin  - IV fluid  - reviewed speciation and sensitivity- continue Vancomycin   - steady improvement       Yes    CKD (chronic kidney disease) stage 3, GFR 30-59 ml/min [N18.3]  -iv hydration  - Cr back to baseline  -decrease IV fluids       Yes    UTI (urinary tract infection) due to Enterococcus [N39.0, B95.2]  - on Vancomycin  - Escalante      Yes    Sepsis due to Enterococcus [A41.81]  -   Yes    Moderate malnutrition [E44.0]   Yes       Malnutrition in the context of Chronic Illness/Injury     Related to (etiology):  Decreased appetite     Signs and Symptoms (as evidenced by):     Body Fat Depletion: mild depletion of orbitals, triceps and thoracic and lumbar region   Muscle Mass Depletion: moderate depletion of temples, clavicle region, scapular region, interosseous muscle and lower  extremities   Fluid Accumulation: mild     Interventions/Recommendations (treatment strategy):  [employer]  Nutrition Education     Nutrition Diagnosis Status:  New     - states like boost better than ensure           Stage IV adenocarcinoma of prostate [C61]  - on androgen deprivation therapy       Yes    Slow transit constipation [K59.01]  -had a BM today  - Dulcolax sup daily        Yes    Moderate protein-calorie malnutrition [E44.0]  - worsening protein/albumin  - push po       Yes    Urinary retention [R33.9]- due to BPH/Prostate CA  - Escalante      Yes    Lymphedema [I89.0]   Yes    Scrotal swelling [N50.89]   Yes    HTN, goal below 140/90 [I10]  - had hypotension with sepsis  - hold off on antihypertensive for now   Yes       Resolved Hospital Problems   No resolved problems to display.      CKD III: renal fxn pending today    Updated pt and family on plan of care     Enrique Barker M.D  Internal Medicine & Geriatric Medicine  Hematology & Oncology  Palliative Medicine    [address] [facility] [address], [address]  [place] [address]  [phone] (Office)  [phone] (Fax)

## 2019-03-17 NOTE — PT/OT/SLP EVAL
Occupational Therapy   Evaluation/Treatment    Name: Obed Sood  MRN: [id number]  Admitting Diagnosis:  Sepsis      Recommendations:     Discharge Recommendations: home health OT(with family assist)  Discharge Equipment Recommendations:  (gait belt)  Barriers to discharge:  None    Assessment:     Obed Sood is a 78 y.o. male with a medical diagnosis of Sepsis.  He presents with self care and functional mobility deficits. . Performance deficits affecting function: weakness, impaired endurance, impaired self care skills, gait instability, impaired functional mobilty, impaired balance, pain, decreased safety awareness, decreased lower extremity function, edema.      Rehab Prognosis: Good; patient would benefit from acute skilled OT services to address these deficits and reach maximum level of function.       Plan:     Patient to be seen 3 x/week to address the above listed problems via self-care/home management, therapeutic activities, therapeutic exercises  · Plan of Care Expires: 03/31/19  · Plan of Care Reviewed with: patient, spouse    Subjective     Chief Complaint: weak from not moving  Patient/Family Comments/goals: go home with HH services to return to [facility]    Occupational Profile:  Living Environment: The patient lives with his spouse in s 2 story house but is able to stay on the 1st floor.  Previous level of function: The patient was able to amb using a rollater with (S),. The patient received assist to bathe and dress. The patient has  awalkin shower and sits on a BSC to bathe. The patient states he is unable to use a BSC to have a BM and has been using his low toilet by pulling up on the door knob and the bidet with his spouse assist.  Roles and Routines: The patient sleeps in his recliner chair. The patient had HH OT and PT in January.  Equipment Used at Home:  bedside commode, rollator, cane, quad, grab bar  Assistance upon Discharge: spouse, daughter and son    Pain/Comfort:  · Pain Rating  "1: 0/10(at rest. The patient states he is "sensitive" to all touch) The patient states he refuses to take pain meds.    Patients cultural, spiritual, Denominational conflicts given the current situation: no    Objective:     Communicated with: Denise hurst prior to session.  Patient found HOB elevated with bed alarm, peripheral IV, arias catheter upon OT entry to room.    General Precautions: Standard, fall   Orthopedic Precautions:N/A   Braces: N/A     Occupational Performance:    Bed Mobility:    · Patient completed Rolling/Turning to Right with contact guard assistance  · Patient completed Scooting/Bridging with stand by assistance  · Patient completed Supine to Sit with contact guard assistance, with leg lift and HOB elevated and use of bed rail and verbal cues and increased time  · Patient completed Sit to Supine with contact guard assistance with increased time to perform    Functional Mobility/Transfers:  · Patient completed Sit <> Stand Transfer with contact guard assistance and minimum assistance  with  rolling walker ( with forward flexed trunk, flexed knees)  · Functional Mobility: The patient was able to side step 4-5 steps using a RW and CGA with c/o tightness on the "back of knees"    Activities of Daily Living:  · Upper Body Dressing: moderate assistance to don back gown, max assist to doff back gown  · Lower Body Dressing: dependence    · Toileting: dependence and The patient has a Arias Catheter with a leg bag. The bag was emptied by his spouse.  The patient refused to allow OT to call the nurse to empty to bag, stating my wife can do it. Nurse was notidfied that leg bag was full.    Cognitive/Visual Perceptual:  Cognitive/Psychosocial Skills:     -       Oriented to: Person, Place and Situation   -       Follows Commands/attention:Follows two-step commands  -       Communication: clear/fluent  -       Memory: No Deficits noted  -       Safety awareness/insight to disability: impaired   -       " "Mood/Affect/Coping skills/emotional control: Appropriate to situation    Physical Exam:  Balance: -       fair  Postural examination/scapula alignment:    -       Rounded shoulders  -       Forward head  Skin integrity: Visible skin intact  Edema:  present in BLE  Sensation:    -       Impaired  The patient states his whole body is sensitive to light touch  Upper Extremity Range of Motion:     -       Right Upper Extremity: WFL  -       Left Upper Extremity: WFL  Upper Extremity Strength:    -       Right Upper Extremity: WFL  -       Left Upper Extremity: WFL    AMPAC 6 Click ADL:  AMPAC Total Score: 19    Treatment & Education:  OT eval is complete. The patient and spouse were educated re: OT role and POC for the acute hospital setting. The patient was educated re: the need tto sit on the EOB with nursing assist/set up to increase his endurance. The patient is resistant to change and states "I don't want anyone to move my things around". The patient's spouse was present and verbalized understanding.  Education:    Patient left HOB elevated with all lines intact, call button in reach, bed alarm on, nurseDenise notified and spouse present    GOALS:   Multidisciplinary Problems     Occupational Therapy Goals        Problem: Occupational Therapy Goal    Goal Priority Disciplines Outcome Interventions   Occupational Therapy Goal     OT, PT/OT Ongoing (interventions implemented as appropriate)    Description:  Goals to be met by: 3/31/19    Patient will increase functional independence with ADLs by performing:    UE Dressing with Stand-by Assistance.  Grooming while standing at sink with Stand-by Assistance.  Sitting at edge of bed x15 minutes with Supervision.  Supine to sit with Supervision.  Step transfer with Stand-by Assistance  Toilet transfer to toilet with Stand-by Assistance.  Upper extremity exercise program x15 reps per handout, with assistance as needed.                      History:     Past Medical " History:   Diagnosis Date    MIAN (acute kidney injury) 09/10/2018    BPH (benign prostatic hyperplasia)     Edema 11/02/2018       Past Surgical History:   Procedure Laterality Date    BIOPSY, PROSTATE N/A 1/14/2019    [facility]    CYSTOSCOPY, WITH RETROGRADE PYELOGRAM AND BILATERAL URETERAL STENT INSERTION Bilateral 1/14/2019    [facility]    HERNIA REPAIR N/A 1999    umbilical    MULTIPLE TOOTH EXTRACTIONS      TONSILLECTOMY Bilateral 1969       Time Tracking:     OT Date of Treatment: 03/17/19  OT Start Time: 1331  OT Stop Time: 1410  OT Total Time (min): 39 min    Billable Minutes:Evaluation 15  Self Care/Home Management 24  Total Time 39    Carmen Mitchell OT  3/17/2019

## 2019-03-18 VITALS
TEMPERATURE: 99 F | OXYGEN SATURATION: 96 % | BODY MASS INDEX: 25.4 KG/M2 | DIASTOLIC BLOOD PRESSURE: 58 MMHG | HEART RATE: 70 BPM | SYSTOLIC BLOOD PRESSURE: 119 MMHG | HEIGHT: 69 IN | WEIGHT: 171.5 LBS | RESPIRATION RATE: 18 BRPM

## 2019-03-18 LAB — VANCOMYCIN TROUGH SERPL-MCNC: 8.5 UG/ML

## 2019-03-18 PROCEDURE — 97162 PT EVAL MOD COMPLEX 30 MIN: CPT

## 2019-03-18 PROCEDURE — 97116 GAIT TRAINING THERAPY: CPT

## 2019-03-18 PROCEDURE — 80202 ASSAY OF VANCOMYCIN: CPT

## 2019-03-18 PROCEDURE — 25000003 PHARM REV CODE 250: Performed by: INTERNAL MEDICINE

## 2019-03-18 PROCEDURE — 36415 COLL VENOUS BLD VENIPUNCTURE: CPT

## 2019-03-18 PROCEDURE — 63600175 PHARM REV CODE 636 W HCPCS: Performed by: INTERNAL MEDICINE

## 2019-03-18 RX ORDER — HYDROCODONE BITARTRATE AND ACETAMINOPHEN 5; 325 MG/1; MG/1
1 TABLET ORAL EVERY 6 HOURS PRN
Qty: 60 TABLET | Refills: 0 | Status: SHIPPED | OUTPATIENT
Start: 2019-03-18 | End: 2019-04-08 | Stop reason: CLARIF

## 2019-03-18 RX ORDER — LINEZOLID 600 MG/1
600 TABLET, FILM COATED ORAL EVERY 12 HOURS
Qty: 6 TABLET | Refills: 0 | Status: SHIPPED | OUTPATIENT
Start: 2019-03-18 | End: 2019-04-08 | Stop reason: CLARIF

## 2019-03-18 RX ORDER — BISACODYL 10 MG
10 SUPPOSITORY, RECTAL RECTAL DAILY
Qty: 30 SUPPOSITORY | Status: SHIPPED | OUTPATIENT
Start: 2019-03-19

## 2019-03-18 RX ADMIN — TAMSULOSIN HYDROCHLORIDE 0.4 MG: 0.4 CAPSULE ORAL at 08:03

## 2019-03-18 RX ADMIN — SODIUM CHLORIDE: 0.9 INJECTION, SOLUTION INTRAVENOUS at 07:03

## 2019-03-18 RX ADMIN — BISACODYL 10 MG RECTAL SUPPOSITORY 10 MG: at 08:03

## 2019-03-18 RX ADMIN — BICALUTAMIDE 50 MG: 50 TABLET, FILM COATED ORAL at 08:03

## 2019-03-18 RX ADMIN — VANCOMYCIN HYDROCHLORIDE 1000 MG: 1 INJECTION, POWDER, FOR SOLUTION INTRAVENOUS at 10:03

## 2019-03-18 RX ADMIN — ENOXAPARIN SODIUM 30 MG: 100 INJECTION SUBCUTANEOUS at 05:03

## 2019-03-18 NOTE — PLAN OF CARE
Problem: Physical Therapy Goal  Goal: Physical Therapy Goal  Goals to be met by: 19     Patient will increase functional independence with mobility by performin. Supine to sit with Modified [place]  2. Rolling to Left and Right with Modified [place]  3. Sit to stand transfer with Modified [place]  4. Bed to chair transfer with Modified [place]   5. Gait >500 feet with Modified [place] using Rolling Walker   6. Lower extremity exercise program 3 sets x10 reps per handout, with independence    Pt ambulated ~400 ft with CGA using RW.

## 2019-03-18 NOTE — PLAN OF CARE
"TN reviewed follow up appointment information as well as  "Sepsis discharge instructions" handout with patient using teach back. Patient stated he will notify the doctor if he has a fever of 100.4 or greater, chest pain, confusion, and chills.  Patient is in agreement and verbalized an understanding. Placed discharge information in blue discharge folder.  TN also reviewed patient responsibility checklist with him using teach back. Patient was able to verbalize his responsibilities after discharge to manage his care at home bein. Going to follow up appointments   2. Picking up rx from the pharmacy when discharged  3. Taking his medications as prescribed     Patient informed that TN will contact him with the name of his home health provider once approved.    Patient informed of Zyvox co-pay.     3758- Patient's nurse, Lorri, informed that patient can discharge from  standpoint.        [id number]   Final Note   Assessment Type Final Discharge Note   Anticipated Discharge Disposition Home-Health   What phone number can be called within the next 1-3 days to see how you are doing after discharge? ([phone])   Hospital Follow Up  Appt(s) scheduled? Yes   Discharge plans and expectations educations in teach back method with documentation complete? Yes   Right Care Referral Info   Post Acute Recommendation Home-care  (Awaiting home health acceptance )     "

## 2019-03-18 NOTE — PROGRESS NOTES
WRITTEN HEALTHCARE DISCHARGE INFORMATION     Things that YOU are responsible for to Manage Your Care At Home:  1. Getting your prescriptions filled.  2. Taking you medications as directed. DO NOT MISS ANY DOSES!  3. Going to your follow-up doctor appointments. This is important because it allows the doctor to monitor your progress and to determine if any changes need to be made to your treatment plan.    If you are unable to make your follow up appointments, please call the number listed and reschedule this appointment.     After discharge, if you need assistance, you can call [facility] On Call Nurse Care Line for 24/7 assistance at [phone]    Thank you for choosing [facility] and allowing us to care for you.   From your care manager:Juliet HURST,TN  [phone]     You should receive a call from [facility] Discharge Department within 48-72 hours to help manage your care after discharge. Please try to make sure that you answer your phone for this important phone call.   Follow-up Information     Enrique Barker MD.    Specialties:  Internal Medicine, Oncology, Hematology and Oncology  Why:  Please call to schedule your PCP appt in 1 week   Contact information:  [address]  [address]  [facility] [address] [phone]

## 2019-03-18 NOTE — PT/OT/SLP EVAL
Physical Therapy Evaluation    Patient Name:  Obed Sood   MRN:  [id number]    Recommendations:     Discharge Recommendations:  home health PT(with family assistance)   Discharge Equipment Recommendations: none   Barriers to discharge: None    Assessment:     Obed Sood is a 78 y.o. male admitted with a medical diagnosis of Sepsis.  He presents with the following impairments/functional limitations:  weakness, impaired functional mobilty, gait instability, impaired balance, decreased lower extremity function, decreased safety awareness, decreased ROM, impaired skin, edema.    Rehab Prognosis: Good; patient would benefit from acute skilled PT services to address these deficits and reach maximum level of function.    Recent Surgery: * No surgery found *      Plan:     During this hospitalization, patient to be seen 3 x/week(M-F) to address the identified rehab impairments via gait training, therapeutic activities, therapeutic exercises and progress toward the following goals:    · Plan of Care Expires:  04/01/19    Subjective     Chief Complaint: Pt stated that he's a little impatient.  Patient/Family Comments/goals: Pt would like to be independent.  Pain/Comfort:  · Pain Rating 1: 0/10    Living Environment:  Pt lives with spouse in a 2 story house with no concerns at entry.  Pt able to stay on the 1st floor.   Prior to admission, patients level of function was independent.  Equipment used at home: rollator, bedside commode, cane, quad, grab bar.  Upon discharge, patient will have assistance from spouse.    Objective:     Patient found L sidelying with all lines intact, call button in reach and spouse present bed alarm, arias catheter, peripheral IV upon PT entry to room.    General Precautions: Standard, fall   Orthopedic Precautions:N/A   Braces: N/A     Exams:  · Cognitive Exam:  Patient was able to follow multiple commands.  Pt very talkative.   · Gross Motor Coordination:  WFL  · Postural Exam:   Patient presented with the following abnormalities:    · -       Rounded shoulders  · -       Forward head  · -       Trunk flexion  · Skin Integrity/Edema:      · -       Skin integrity: Per chart, pt with sacral pressure injury.  · -       Edema: Moderate BLE  · BLE ROM: WFL  · BLE Strength: WFL    Functional Mobility: Pt was dependent to don B shoes sitting in bedside chair.    · Bed Mobility:     · Scooting: stand by assistance and contact guard assistance  · Supine to Sit: stand by assistance and contact guard assistance with HOB elevated   · Transfers:     · Sit to Stand:  contact guard assistance with rolling walker x 3 trials  · Bed to Chair: contact guard assistance with  rolling walker  using  Step Transfer  · Gait: Pt ambulated ~400 ft with CGA using RW and B shoes.  Pt with forward flexed posture, increased B hip ER, and decreased foot clearance.  Pt slightly impulsive with decreased safety awareness.    · Balance: Pt with fair dynamic standing balance.       Therapeutic Activities and Exercises:  Pt instructed on B AP's while up in recliner.  Pt able to perform without difficulty.  Pt/spouse educated on seat cushion while up in bedside chair here at the hospital and when at home.  Pt/spouse verbalized understanding.     AM-PAC 6 CLICK MOBILITY  Total Score:21     Patient left up in chair on seat cushion reclined with BLE elevated on pillow with all lines intact, call button in reach, nurse Lorri notified and spouse present.    GOALS:   Multidisciplinary Problems     Physical Therapy Goals        Problem: Physical Therapy Goal    Goal Priority Disciplines Outcome Goal Variances Interventions   Physical Therapy Goal     PT, PT/OT      Description:  Goals to be met by: 19     Patient will increase functional independence with mobility by performin. Supine to sit with Modified [place]  2. Rolling to Left and Right with Modified [place]  3. Sit to stand transfer with Modified  [place]  4. Bed to chair transfer with Modified [place]   5. Gait >500 feet with Modified [place] using Rolling Walker   6. Lower extremity exercise program 3 sets x10 reps per handout, with independence                      History:     Past Medical History:   Diagnosis Date    MIAN (acute kidney injury) 09/10/2018    BPH (benign prostatic hyperplasia)     Edema 11/02/2018       Past Surgical History:   Procedure Laterality Date    BIOPSY, PROSTATE N/A 1/14/2019    [facility]    CYSTOSCOPY, WITH RETROGRADE PYELOGRAM AND BILATERAL URETERAL STENT INSERTION Bilateral 1/14/2019    [facility] at [facility] OR    HERNIA REPAIR N/A 1999    umbilical    MULTIPLE TOOTH EXTRACTIONS      TONSILLECTOMY Bilateral 1969       Time Tracking:     PT Received On: 03/18/19  PT Start Time: 1016     PT Stop Time: 1048  PT Total Time (min): 32 min     Billable Minutes: Evaluation 20 min and Gait Training 12 min      Emilia Cuevas, PT  03/18/2019

## 2019-03-18 NOTE — PROGRESS NOTES
[facility]  Hematology/Oncology  Progress Note    Patient Name: Obed Sood  Admission Date: 3/14/2019  Hospital Length of Stay: 4 days  Code Status: Full Code     Subjective:     Mr. Sood is a pleasant 79 YO gentleman with most recent dx of metastatic adenocarcinoma of prostate. He was started on androgen deprivation therapy. Pt has chronic indwelling Escalante cath and underwent removal few days ago. Pt could not urinate. He attempted self cath without success and went to urology clinic and underwent Escalante insertion.  He began to have moderate to severe weakness and intermittent chills along with difficulty ambulating. Pt was brought to the clinic and found to have sepsis. Pt was started on empiric ABX and IV fluids until urine culture came back + for Enterococcus species. Pt had hypotension and low grade temp with some changes in mental status and difficulty ambulating. Pt was admitted to [facility] directly from my office for severe sepsis and bacterial UTI.      Pt was given one dose of IV Vancomycin th office. Since admission, feeling little better. Appetite little better. Had BM today without medication.         Interval History:     03/18/19: Denies any ab pain, n/v. Seen by PT/OT.   03/17/19: No fever, having BM.   03/16/19: Denies fever or chills. Appetite improving.       Oncology Treatment Plan:   [No treatment plan]    Medications:  Continuous Infusions:   sodium chloride 0.9% 1,000 mL (03/17/19 0348)     Scheduled Meds:   bicalutamide  50 mg Oral Daily    bisacodyl  10 mg Rectal Daily    enoxaparin  30 mg Subcutaneous Daily    tamsulosin  0.4 mg Oral Daily    vancomycin (VANCOCIN) IVPB  1,000 mg Intravenous Q24H     PRN Meds:HYDROcodone-acetaminophen, sodium chloride 0.9%     Review of Systems     Constitutional: Appetite better   Eyes: no visual changes   ENT: no nasal congestion.   Respiratory: no sob   Cardiovascular: no chest pain or palpitations   Gastrointestinal: no  nausea, vomiting or abdominal pain.   Hematologic/Lymphatic: denies hematuria  Musculoskeletal: fabien leg weakness  Neurological: intermittent confusion   Skin: No rashes or lesions  Psych: Denies any anxiety      Objective:     Vital Signs (Most Recent):  Temp: 98.6 °F (37 °C) (03/18/19 0359)  Pulse: 86 (03/18/19 0359)  Resp: 18 (03/18/19 0359)  BP: 129/60 (03/18/19 0359)  SpO2: (!) 92 % (03/18/19 0359) Vital Signs (24h Range):  Temp:  [97.5 °F (36.4 °C)-98.6 °F (37 °C)] 98.6 °F (37 °C)  Pulse:  [71-88] 86  Resp:  [18] 18  SpO2:  [92 %-97 %] 92 %  BP: (129-148)/(60-70) 129/60     Weight: 77.8 kg (171 lb 8.3 oz)(noted edema)  Body mass index is 25.33 kg/m².  Body surface area is 1.95 meters squared.      Intake/Output Summary (Last 24 hours) at 3/18/2019 0700  Last data filed at 3/18/2019 0600  Gross per 24 hour   Intake 600 ml   Output 4575 ml   Net -3975 ml       Physical Exam     General: NAD, in bed. Mrs. Sood at the bedside     Head: normocephalic, atraumatic   Eyes: conjunctiva slightly pale  Throat: No erythema or post nasal discharge   Neck: supple, no LAD   Lungs: Decreased BS at the bases   Heart: S1, S2, RRR   Abdomen: + BS x 4 quadrants, no sig tenderness   : Escalante with yellow urine  Extremities: fabien leg edema   Skin: abdominal wall edema   MS: ROM limited fabien   Neuro: A&O x 2  Psy: calm     Significant Labs:     CBC:   Recent Labs   Lab 03/17/19  0528   WBC 9.89   HGB 10.5*   HCT 32.6*      , CMP:   Recent Labs   Lab 03/16/19  1043 03/17/19  0528    135*   K 3.6 3.6    106   CO2 28 23   * 103   BUN 26* 24*   CREATININE 1.3 1.2   CALCIUM 8.4* 8.2*   PROT  --  5.4*   ALBUMIN  --  2.2*   BILITOT  --  0.6   ALKPHOS  --  237*   AST  --  81*   ALT  --  88*   ANIONGAP 3* 6*   EGFRNONAA 52* 58*   , Coagulation: No results for input(s): PT, INR, APTT in the last 48 hours., LDH: No results for input(s): LDHCSF, BFSOURCE in the last 48 hours., Reticulocytes: No results for input(s):  RETIC in the last 48 hours., Tumor Markers: No results for input(s): PSA, CEA, , AFPTM, [id number],  in the last 48 hours.    Invalid input(s): ALGTM, Uric Acid No results for input(s): URICACID in the last 48 hours., Urine Studies: No results for input(s): COLORU, APPEARANCEUA, PHUR, SPECGRAV, PROTEINUA, GLUCUA, KETONESU, BILIRUBINUA, OCCULTUA, NITRITE, UROBILINOGEN, LEUKOCYTESUR, RBCUA, WBCUA, BACTERIA, SQUAMEPITHEL, HYALINECASTS in the last 48 hours.    Invalid input(s): WRIGHTSUR and All pertinent labs from the last 24 hours have been reviewed.    Diagnostic Results:    Urine culture   Order: [id number]   Status:  Final result   Visible to patient:  Yes (Patient Portal) Next appt:  03/19/2019 at 09:00 AM in Urology (Alisa Andres, NP) Dx:  Urinary tract infection associated wi...   Specimen Information: Urine, Clean Catch        Component 3d ago   Urine Culture, Routine ENTEROCOCCUS FAECALIS   > 100,000 cfu/ml       Resulting Agency [facility]   Susceptibility      Enterococcus faecalis     CULTURE, URINE     Ampicillin <=2  Sensitive     Nitrofurantoin <=32  Sensitive     Tetracycline >8  Resistant     Vancomycin 2  Sensitive            Linear View                Assessment/Plan:            Active Hospital Problems     Diagnosis   POA    *Sepsis [A41.9]- severe with intermittent confusion and severe weakness  - IV Vancomycin  - IV fluid  - reviewed speciation and sensitivity- continue Vancomycin   - steady improvement  - DC home on few more days tx- Zyvox x 3 days        Yes    CKD (chronic kidney disease) stage 3, GFR 30-59 ml/min [N18.3]  -iv hydration  - Cr back to baseline  -decrease IV fluids       Yes    UTI (urinary tract infection) due to Enterococcus [N39.0, B95.2]  - on Vancomycin  - Escalante      Yes    Sepsis due to Enterococcus [A41.81]  -   Yes    Moderate malnutrition [E44.0]   Yes       Malnutrition in the context of Chronic Illness/Injury     Related to (etiology):  Decreased  appetite     Signs and Symptoms (as evidenced by):     Body Fat Depletion: mild depletion of orbitals, triceps and thoracic and lumbar region   Muscle Mass Depletion: moderate depletion of temples, clavicle region, scapular region, interosseous muscle and lower extremities   Fluid Accumulation: mild     Interventions/Recommendations (treatment strategy):  Com[employer] Bever[employer]  Nutrition Education     Nutrition Diagnosis Status:  New     - states like boost better than ensure           Stage IV adenocarcinoma of prostate [C61]  - on androgen deprivation therapy       Yes    Slow transit constipation [K59.01]  -had a BM today  - Dulcolax sup daily      Yes    Moderate protein-calorie malnutrition [E44.0]  - worsening protein/albumin  - push po       Yes    Urinary retention [R33.9]- due to BPH/Prostate CA  - Escalante      Yes    Lymphedema [I89.0]   Yes    Scrotal swelling [N50.89]   Yes    HTN, goal below 140/90 [I10]  - had hypotension with sepsis  - hold off on antihypertensive for now   Yes       Resolved Hospital Problems   No resolved problems to display.      CKD III: Cr normalized     Updated pt and family on plan of care.   CM to work on getting a different  services    Enrique Barker M.D  Internal Medicine & Geriatric Medicine  Hematology & Oncology  Palliative Medicine    [address], [address]  [place], [place] [id number] [phone] (Office)  [phone] (Fax)

## 2019-03-18 NOTE — PLAN OF CARE
[facility]    HOME HEALTH ORDERS  FACE TO FACE ENCOUNTER    Patient Name: Obed Sood  YOB: 1940    PCP: Enrique Barker MD   PCP Address: [facility] [address] [facility] [address]  PCP Phone Number: [phone]  PCP Fax: [phone]    Encounter Date: 03/18/2019    Admit to Home Health    Diagnoses:  Active Hospital Problems    Diagnosis  POA    *Sepsis [A41.9]  Yes    CKD (chronic kidney disease) stage 3, GFR 30-59 ml/min [N18.3]  Yes    UTI (urinary tract infection) due to Enterococcus [N39.0, B95.2]  Yes    Sepsis due to Enterococcus [A41.81]  Yes    Moderate malnutrition [E44.0]  Yes     Malnutrition in the context of Chronic Illness/Injury     Related to (etiology):  Decreased appetite     Signs and Symptoms (as evidenced by):     Body Fat Depletion: mild depletion of orbitals, triceps and thoracic and lumbar region   Muscle Mass Depletion: moderate depletion of temples, clavicle region, scapular region, interosseous muscle and lower extremities   Fluid Accumulation: mild     Interventions/Recommendations (treatment strategy):  Commercial Beverage  Nutrition Education     Nutrition Diagnosis Status:  New          Stage IV adenocarcinoma of prostate [C61]  Yes    Slow transit constipation [K59.01]  Yes    Moderate protein-calorie malnutrition [E44.0]  Yes    Urinary retention [R33.9]  Yes    Lymphedema [I89.0]  Yes    Scrotal swelling [N50.89]  Yes    HTN, goal below 140/90 [I10]  Yes      Resolved Hospital Problems   No resolved problems to display.       Future Appointments   Date Time Provider Department Center   4/29/2019  2:15 PM ROSINA Bardales MD [facility]           My clinical findings that support the need for the home health skilled services and home bound status are the following:  Weakness/numbness causing balance and gait disturbance due to Sepsis making it taxing to leave home.    Allergies:Review of patient's allergies  indicates:  No Known Allergies    Diet: regular diet    Activities: as tolerated    Nursing:   SN to complete comprehensive assessment including routine vital signs. Instruct on disease process and s/s of complications to report to MD. Review/verify medication list sent home with the patient at time of discharge  and instruct patient/caregiver as needed. Frequency may be adjusted depending on start of care date.    Notify MD if SBP > 160 or < 90; DBP > 90 or < 50; HR > 120 or < 50; Temp > 101      CONSULTS:    Physical Therapy to evaluate and treat. Evaluate for home safety and equipment needs; Establish/upgrade home exercise program. Perform / instruct on therapeutic exercises, gait training, transfer training, and Range of Motion.  Occupational Therapy to evaluate and treat. Evaluate home environment for safety and equipment needs. Perform/Instruct on transfers, ADL training, ROM, and therapeutic exercises.    MISCELLANEOUS CARE:  Wound Care Orders:  barrier cream to cocyx daily      Medications: Review discharge medications with patient and family and provide education.       Obed Sood   Home Medication Instructions ALISSA:[id number]    Printed on:03/18/19 3908   Medication Information                      bicalutamide (CASODEX) 50 MG Tab  Take 1 tablet (50 mg total) by mouth once daily.             bisacodyl (DULCOLAX) 10 mg Supp  Place 1 suppository (10 mg total) rectally once daily.             HYDROcodone-acetaminophen (NORCO) 5-325 mg per tablet  Take 1 tablet by mouth every 6 (six) hours as needed.             leuprolide, 6 month, (ELIGARD) 45 mg injection  Inject 45 mg into the skin every 6 (six) months.             linezolid (ZYVOX) 600 mg Tab  Take 1 tablet (600 mg total) by mouth every 12 (twelve) hours.             saw palmetto 160 MG capsule  Take 160 mg by mouth 2 (two) times daily.             tamsulosin (FLOMAX) 0.4 mg Cap  Take 1 capsule (0.4 mg total) by mouth once daily.              triptorelin pamoate (TRELSTAR) 11.25 mg SusR  Inject 11.25 mg into the muscle every 12 weeks.                 I certify that this patient is confined to his home and needs intermittent skilled nursing care, physical therapy and occupational therapy.

## 2019-03-18 NOTE — PROGRESS NOTES
TN sent patient's clinicals (POC, Packet, MD notes, MAR, and PT/OT notes) to [employer] [facility]. Awaiting feedback.

## 2019-03-18 NOTE — PROGRESS NOTES
TN contacted patient's pharmacy to verify cost of patient's Zyvox 600mg tablet. Spoke with Phi, PharmD.     Patient's co-pay is $72.43        [id number] - [place] - [address] AT [facility] [address]  [address]  [place] [address]  Phone: [phone] Fax: [phone]

## 2019-03-19 LAB
BACTERIA BLD CULT: NORMAL
BACTERIA BLD CULT: NORMAL

## 2019-03-19 NOTE — PHYSICIAN QUERY
PT Name: Obed Sood  MR #: [id number]     CDS/: Minesh Peoples               Contact information:   [email or web address]    This form is a permanent document in the medical record.     Query Date: March 19, 2019    Physician Query - Neurological Condition Clarification    By submitting this query, we are merely seeking further clarification of documentation to reflect the severity of illness of your patient. Please utilize your independent clinical judgment when addressing the question(s) below.    The Medical record reflects the following:     Indicators   Supporting Clinical Findings Location in Medical Record   x AMS, Confusion,  LOC, etc.  Pt had hypotension and low grade temp with some changes in mental status and difficulty ambulating....Neuro: A&O x 2     Sepsis [A41.9]- severe with intermittent confusion and severe weakness    Neurological: intermittent confusion    3/15  H&P        3/15  H&P      3/16 Hem/Onc- Mj     x Acute / Chronic Illness Sepsis due to Enterococcus;  Moderate malnutrition; UTI due to Enterococcus; ....     - had hypotension with sepsis  - hold off on antihypertensive for now 3/15  H&P      3/16  Mj    Radiology Findings      Electrolyte Imbalance      Medication      Treatment             x Other  Stage IV adenocarcinoma of prostate [C61]  - on androgen deprivation therapy     IV fluid---- reviewed speciation and sensitivity- continue Vancomycin ---- steady improvement     3/18  DC summary      3/18  DC summary       Encephalopathy- is a general term for any diffuse disease of the brain that alters brain function or structure. Treatment of the cognitive dysfunction varies but is ultimately dependent on the treatment of the underlying condition.    Major Symptoms of Encephalopathy - Decreased level of consciousness, fluctuating alertness/concentration, confusion, agitation, lethargy, somnolence, drowsiness, obtundation, stupor, or coma.          References: National Institutes of Healths (NIH) National [place] of Neurological Disorders and Strokes;  HCPro 2016; Advisory Board     Clinical Guidelines:   These guidelines will set system standards to assist providers in managing, documentation, and coding of encephalopathy. The intent of this document is to serve as a system guideline, not replace the providers clinical judgment:    Provider, please specify the diagnosis that you suspect, likely, or is probably associated with above clinical findings.  Please kamla all that apply:     [ x  ] Metabolic Encephalopathy - Due to electrolye imbalance, metabolic derangements, or infections processes, includes Septic Encephalopathy   [   ] Toxic Encephalopathy - Due to drugs, chemicals, or other toxic substances   [   ] Other Encephalopathy - Includes uremic encephalopathy   [   ] Unspecified Encephalopathy      [   ] Other Neurological Condition-  Includes Post-ictal altered mental status. (please specify condition): _________   [   ]  Clinically Undetermined     Please document in your progress notes daily for the duration of treatment until resolved, and include in your discharge summary.

## 2019-03-19 NOTE — PHYSICIAN QUERY
PT Name: Obed Sood  MR #: [id number]    Physician Query Form - Cause and Effect Relationship Clarification      CDS/: Minesh Peoples RN               Contact information:   [email or web address]    This form is a permanent document in the medical record.     Query Date: March 19, 2019    By submitting this query, we are merely seeking further clarification of documentation. Please utilize your independent clinical judgment when addressing the question(s) below.    The Medical record contains the following:  Supporting Clinical Findings   Location in record     Sepsis due to Enterococcus ;    UTI (urinary tract infection) due to Enterococcus --- on Vancomycin  - Escalante                      ...Pt has chronic indwelling Escalante cath and underwent removal few days ago. Pt could not urinate. He attempted self cath without success and went to urology clinic and underwent Escalante insertion....                                                                                                                                                           3/15  H&P        3/15  H&P                                                                                                                                                                                            Provider, please clarify if there is any correlation between:       Escalante catheter       and        sepsis.           Are the conditions:      [x  ] Due to or associated with each other   [  ] Unrelated to each other   [  ] Other (Please Specify): _________________________   [  ] Clinically Undetermined

## 2019-03-19 NOTE — NURSING
visited with new orders noted. Pt.for discharge. Iv fluids & saline lock d/c. F/u arrangements per case management. Discharge instr.given, ride at bedside  Transport requested for assistance with discharge.

## 2019-03-19 NOTE — DISCHARGE SUMMARY
[facility]  Hematology/Oncology  Discharge Summary      Patient Name: Obed Sood  MRN: [id number]  Admission Date: 3/14/2019  Hospital Length of Stay: 4 days  Discharge Date and Time: 3/18/2019  7:14 PM  Attending Physician: No att. providers found   Discharging Provider: Enrique Barker MD  Primary Care Provider: Enrique Barker MD    HPI:     Mr. Sood is a pleasant 79 YO gentleman with most recent dx of metastatic adenocarcinoma of prostate. He was started on androgen deprivation therapy. Pt has chronic indwelling Escalante cath and underwent removal few days ago. Pt could not urinate. He attempted self cath without success and went to urology clinic and underwent Escalante insertion.  He began to have moderate to severe weakness and intermittent chills along with difficulty ambulating. Pt was brought to the clinic and found to have sepsis. Pt was started on empiric ABX and IV fluids until urine culture came back + for Enterococcus species. Pt had hypotension and low grade temp with some changes in mental status and difficulty ambulating. Pt was admitted to [facility] directly from my office for severe sepsis and bacterial UTI.      Pt was given one dose of IV Vancomycin th office. Since admission, feeling little better. Appetite little better. Had BM today without medication.         * No surgery found *     Hospital Course:     During this hospitalization, these following conditions were addressed and managed along with other comorbid conditions:    Active Hospital Problems     Diagnosis   POA    *Sepsis [A41.9]- severe with intermittent confusion and severe weakness  - IV Vancomycin  - IV fluid  - reviewed speciation and sensitivity- continue Vancomycin   - steady improvement  - DC home on few more days tx- Zyvox x 3 days        Yes    CKD (chronic kidney disease) stage 3, GFR 30-59 ml/min [N18.3]  -iv hydration  - Cr back to baseline  -decrease IV fluids       Yes    UTI (urinary tract  infection) due to Enterococcus [N39.0, B95.2]  - on Vancomycin  - Escalante      Yes    Sepsis due to Enterococcus [A41.81]  -   Yes    Moderate malnutrition [E44.0]   Yes       Malnutrition in the context of Chronic Illness/Injury     Related to (etiology):  Decreased appetite     Signs and Symptoms (as evidenced by):     Body Fat Depletion: mild depletion of orbitals, triceps and thoracic and lumbar region   Muscle Mass Depletion: moderate depletion of temples, clavicle region, scapular region, interosseous muscle and lower extremities   Fluid Accumulation: mild     Interventions/Recommendations (treatment strategy):  Commercial Beverage  Nutrition Education     Nutrition Diagnosis Status:  New     - states like boost better than ensure           Stage IV adenocarcinoma of prostate [C61]  - on androgen deprivation therapy       Yes    Slow transit constipation [K59.01]  -had a BM today  - Dulcolax sup daily      Yes    Moderate protein-calorie malnutrition [E44.0]  - worsening protein/albumin  - push po       Yes    Urinary retention [R33.9]- due to BPH/Prostate CA  - Escalante      Yes    Lymphedema [I89.0]   Yes    Scrotal swelling [N50.89]   Yes    HTN, goal below 140/90 [I10]  - had hypotension with sepsis  - hold off on antihypertensive for now   Yes       Resolved Hospital Problems   No resolved problems to display.      CKD III: Cr normalized      Updated pt and family on plan of care.   CM to work on getting a different  services        Consults:   Consults (From admission, onward)        Status Ordering Provider     Inpatient consult to Registered Dietitian/Nutritionist  Once     Provider:  (Not yet assigned)    Completed HAZEL STUBBS          Significant Diagnostic Studies: see above    Pending Diagnostic Studies:     None        Final Active Diagnoses:    Diagnosis Date Noted POA    PRINCIPAL PROBLEM:  Sepsis [A41.9] 03/14/2019 Yes    CKD (chronic kidney disease) stage 3, GFR 30-59 ml/min  [N18.3] 03/15/2019 Yes    UTI (urinary tract infection) due to Enterococcus [N39.0, B95.2] 03/15/2019 Yes    Sepsis due to Enterococcus [A41.81] 03/15/2019 Yes    Moderate malnutrition [E44.0] 03/15/2019 Yes    Stage IV adenocarcinoma of prostate [C61] 02/19/2019 Yes    Slow transit constipation [K59.01] 02/19/2019 Yes    Moderate protein-calorie malnutrition [E44.0] 01/31/2019 Yes    Urinary retention [R33.9] 01/18/2019 Yes    Lymphedema [I89.0] 01/04/2019 Yes    Scrotal swelling [N50.89] 01/04/2019 Yes    HTN, goal below 140/90 [I10] 10/05/2018 Yes      Problems Resolved During this Admission:      Discharged Condition: stable    Disposition: Home or Self Care     Activity: as tolerated    Diet: high protein     Follow Up:  Follow-up Information     Enrique Barker MD.    Specialties:  Internal Medicine, Oncology, Hematology and Oncology  Why:  Please call to schedule your PCP appt in 1 week   Contact information:  [address] [facility]  [address] [place] [address]  [phone]                 Patient Instructions:   No discharge procedures on file.  Medications:  Reconciled Home Medications:      Medication List      START taking these medications    bisacodyl 10 mg Supp  Commonly known as:  DULCOLAX  Place 1 suppository (10 mg total) rectally once daily.  Start taking on:  3/19/2019     HYDROcodone-acetaminophen 5-325 mg per tablet  Commonly known as:  NORCO  Take 1 tablet by mouth every 6 (six) hours as needed.     linezolid 600 mg Tab  Commonly known as:  ZYVOX  Take 1 tablet (600 mg total) by mouth every 12 (twelve) hours.        CONTINUE taking these medications    bicalutamide 50 MG Tab  Commonly known as:  CASODEX  Take 1 tablet (50 mg total) by mouth once daily.     leuprolide (6 month) 45 mg injection  Commonly known as:  ELIGARD  Inject 45 mg into the skin every 6 (six) months.     saw palmetto 160 MG capsule  Take 160 mg by mouth 2 (two) times daily.     tamsulosin 0.4 mg Cap  Commonly known  as:  FLOMAX  Take 1 capsule (0.4 mg total) by mouth once daily.     triptorelin pamoate 11.25 mg Susr  Commonly known as:  TRELSTAR  Inject 11.25 mg into the muscle every 12 weeks.            Enrique Barker MD  Hematology/Oncology  [facility]

## 2019-03-19 NOTE — PROGRESS NOTES
Patient's wife informed that a nurse from [facility] will contact them to schedule patient's home visits.

## 2019-03-20 NOTE — PT/OT/SLP DISCHARGE
Physical Therapy Discharge Summary    Name: Obed Sood  MRN: [id number]   Principal Problem: Sepsis     Patient Discharged from acute Physical Therapy on 3/18/19.  Please refer to prior PT notes for functional status.     Assessment:     Patient appropriate for care in another setting.    Objective:     GOALS:   Multidisciplinary Problems     Physical Therapy Goals        Problem: Physical Therapy Goal    Goal Priority Disciplines Outcome Goal Variances Interventions   Physical Therapy Goal     PT, PT/OT      Description:  Goals to be met by: 19     Patient will increase functional independence with mobility by performin. Supine to sit with Modified Supai  2. Rolling to Left and Right with Modified Supai  3. Sit to stand transfer with Modified Supai  4. Bed to chair transfer with Modified Supai   5. Gait >500 feet with Modified Supai using Rolling Walker   6. Lower extremity exercise program 3 sets x10 reps per handout, with independence                      Reasons for Discontinuation of Therapy Services  Transfer to alternate level of care.      Plan:     Patient Discharged to: Home with Home Health Service.    Emilia Cuevas, PT  3/20/2019

## 2019-04-01 DIAGNOSIS — C61 PROSTATE CANCER: Primary | ICD-10-CM

## 2019-04-08 ENCOUNTER — HOSPITAL ENCOUNTER (OUTPATIENT)
Dept: PREADMISSION TESTING | Facility: HOSPITAL | Age: 79
Discharge: HOME | End: 2019-04-08
Attending: UROLOGY
Payer: MEDICARE

## 2019-04-08 VITALS
SYSTOLIC BLOOD PRESSURE: 132 MMHG | RESPIRATION RATE: 18 BRPM | HEIGHT: 69 IN | DIASTOLIC BLOOD PRESSURE: 68 MMHG | HEART RATE: 84 BPM | OXYGEN SATURATION: 99 % | TEMPERATURE: 97 F | BODY MASS INDEX: 23.87 KG/M2 | WEIGHT: 161.19 LBS

## 2019-04-08 DIAGNOSIS — Z01.818 PRE-OP TESTING: Primary | ICD-10-CM

## 2019-04-08 PROCEDURE — 93010 EKG 12-LEAD: ICD-10-PCS | Mod: ,,, | Performed by: INTERNAL MEDICINE

## 2019-04-08 PROCEDURE — 93005 ELECTROCARDIOGRAM TRACING: CPT

## 2019-04-08 PROCEDURE — 93010 ELECTROCARDIOGRAM REPORT: CPT | Mod: ,,, | Performed by: INTERNAL MEDICINE

## 2019-04-08 NOTE — DISCHARGE INSTRUCTIONS
"Your procedure  is scheduled for __Monday April 15, 2019________.    Call [phone] between 2pm and 5pm on Friday April 12, 2019_______to find out your arrival time for the day of surgery.    Report to Same Day Surgery Unit at ____ AM on the 2nd floor of the hospital.  Use the front entrance of the hospital.  The front doors of the hospital open promptly at 5:30am.  If you need wheelchair assistance, call [phone] from your cell phone, or call "0" from the courtesy phone in the lobby.    Important instructions:   Do not eat or drink after 12 midnight, including water.  It is okay to brush your teeth.  Do not have gum, candy or mints.     Take only these medications with a small swallow of water on the morning of your surgery  ___Casodex, Flomax___________    Stop taking Aspirin, Ibuprofen, Motrin and Aleve , Fish oil, and Vitamin E for at least 7 days before your surgery. You may use Tylenol unless otherwise instructed by your doctor.         Please shower the night before and the morning of your surgery.       No shaving of procedural area at least 4-5 days before surgery due to increased risk of skin irritation and/or possible infection.     You may wear deodorant only.      Do not wear powder, body lotion or perfume/cologne.     Do not wear any jewelry or have any metal on your body.     You will be asked to remove any dentures or partials for the procedure.     Please bring any documents given to you by your doctor.     If you are going home on the same day of surgery, you must arrange for a family member or a friend to drive you home.  Public transportation is prohibited.  You will not be able to drive home if you were given anesthesia or sedation.     Children under 18 years of age require a parent/guardian present the entire time that they are here.     Wear loose fitting clothes allowing for bandages.     Please leave money and valuables home.       You may bring your cell phone.     Call the " doctor if fever or illness should occur before your surgery.    Call [phone] to contact us here if needed.

## 2019-04-08 NOTE — PRE-PROCEDURE INSTRUCTIONS
EKG reviewed per Dr. Canchola, she stated she reviewed pt chart in Epic, also aware of pt recent hospitalization in March for sepsis and [facility] in November 2018 for BLE edema (which has resolved since then). Pt denies chest pain, SOB. Dr. Canchola states no further testing is needed.

## 2019-04-15 ENCOUNTER — ANESTHESIA (OUTPATIENT)
Dept: SURGERY | Facility: HOSPITAL | Age: 79
End: 2019-04-15
Payer: MEDICARE

## 2019-04-15 ENCOUNTER — ANESTHESIA EVENT (OUTPATIENT)
Dept: SURGERY | Facility: HOSPITAL | Age: 79
End: 2019-04-15
Payer: MEDICARE

## 2019-04-15 ENCOUNTER — HOSPITAL ENCOUNTER (OUTPATIENT)
Facility: HOSPITAL | Age: 79
Discharge: HOME | End: 2019-04-15
Attending: UROLOGY | Admitting: UROLOGY
Payer: MEDICARE

## 2019-04-15 VITALS
DIASTOLIC BLOOD PRESSURE: 69 MMHG | OXYGEN SATURATION: 98 % | HEART RATE: 78 BPM | TEMPERATURE: 98 F | RESPIRATION RATE: 16 BRPM | SYSTOLIC BLOOD PRESSURE: 148 MMHG

## 2019-04-15 DIAGNOSIS — N13.30 BILATERAL HYDRONEPHROSIS: Primary | ICD-10-CM

## 2019-04-15 PROCEDURE — 25000003 PHARM REV CODE 250: Performed by: UROLOGY

## 2019-04-15 PROCEDURE — 76000 FLUOROSCOPY <1 HR PHYS/QHP: CPT | Mod: 26,59,, | Performed by: UROLOGY

## 2019-04-15 PROCEDURE — 71000033 HC RECOVERY, INTIAL HOUR: Performed by: UROLOGY

## 2019-04-15 PROCEDURE — 88300 SURGICAL PATH GROSS: CPT | Performed by: PATHOLOGY

## 2019-04-15 PROCEDURE — 71000016 HC POSTOP RECOV ADDL HR: Performed by: UROLOGY

## 2019-04-15 PROCEDURE — 52332 CYSTOSCOPY AND TREATMENT: CPT | Mod: 50,,, | Performed by: UROLOGY

## 2019-04-15 PROCEDURE — C1758 CATHETER, URETERAL: HCPCS | Performed by: UROLOGY

## 2019-04-15 PROCEDURE — 71000015 HC POSTOP RECOV 1ST HR: Performed by: UROLOGY

## 2019-04-15 PROCEDURE — 88300 SURGICAL PATH GROSS: CPT | Mod: 26,,, | Performed by: PATHOLOGY

## 2019-04-15 PROCEDURE — 37000009 HC ANESTHESIA EA ADD 15 MINS: Performed by: UROLOGY

## 2019-04-15 PROCEDURE — 25500020 PHARM REV CODE 255: Performed by: UROLOGY

## 2019-04-15 PROCEDURE — 63600175 PHARM REV CODE 636 W HCPCS: Performed by: NURSE ANESTHETIST, CERTIFIED REGISTERED

## 2019-04-15 PROCEDURE — 25000003 PHARM REV CODE 250: Performed by: NURSE ANESTHETIST, CERTIFIED REGISTERED

## 2019-04-15 PROCEDURE — D9220A PRA ANESTHESIA: ICD-10-PCS | Mod: CRNA,,, | Performed by: NURSE ANESTHETIST, CERTIFIED REGISTERED

## 2019-04-15 PROCEDURE — 88300 TISSUE SPECIMEN TO PATHOLOGY - SURGERY: ICD-10-PCS | Mod: 26,,, | Performed by: PATHOLOGY

## 2019-04-15 PROCEDURE — D9220A PRA ANESTHESIA: ICD-10-PCS | Mod: ANES,,, | Performed by: ANESTHESIOLOGY

## 2019-04-15 PROCEDURE — 36000707: Performed by: UROLOGY

## 2019-04-15 PROCEDURE — C2617 STENT, NON-COR, TEM W/O DEL: HCPCS | Performed by: UROLOGY

## 2019-04-15 PROCEDURE — 74420 UROGRAPHY RTRGR +-KUB: CPT | Mod: 26,,, | Performed by: UROLOGY

## 2019-04-15 PROCEDURE — D9220A PRA ANESTHESIA: Mod: CRNA,,, | Performed by: NURSE ANESTHETIST, CERTIFIED REGISTERED

## 2019-04-15 PROCEDURE — 76000 PR  FLUOROSCOPE EXAMINATION: ICD-10-PCS | Mod: 26,59,, | Performed by: UROLOGY

## 2019-04-15 PROCEDURE — C1769 GUIDE WIRE: HCPCS | Performed by: UROLOGY

## 2019-04-15 PROCEDURE — 63600175 PHARM REV CODE 636 W HCPCS: Performed by: UROLOGY

## 2019-04-15 PROCEDURE — D9220A PRA ANESTHESIA: Mod: ANES,,, | Performed by: ANESTHESIOLOGY

## 2019-04-15 PROCEDURE — 36000706: Performed by: UROLOGY

## 2019-04-15 PROCEDURE — 74420 PR  X-RAY RETROGRADE PYELOGRAM: ICD-10-PCS | Mod: 26,,, | Performed by: UROLOGY

## 2019-04-15 PROCEDURE — 37000008 HC ANESTHESIA 1ST 15 MINUTES: Performed by: UROLOGY

## 2019-04-15 PROCEDURE — 52332 PR CYSTOSCOPY,INSERT URETERAL STENT: ICD-10-PCS | Mod: 50,,, | Performed by: UROLOGY

## 2019-04-15 DEVICE — STENT URET PERCUFLEX 6FR 26CM: Type: IMPLANTABLE DEVICE | Site: URETER | Status: FUNCTIONAL

## 2019-04-15 RX ORDER — SODIUM CHLORIDE, SODIUM LACTATE, POTASSIUM CHLORIDE, CALCIUM CHLORIDE 600; 310; 30; 20 MG/100ML; MG/100ML; MG/100ML; MG/100ML
INJECTION, SOLUTION INTRAVENOUS CONTINUOUS
Status: DISCONTINUED | OUTPATIENT
Start: 2019-04-15 | End: 2019-04-15 | Stop reason: HOSPADM

## 2019-04-15 RX ORDER — PROPOFOL 10 MG/ML
VIAL (ML) INTRAVENOUS
Status: DISCONTINUED | OUTPATIENT
Start: 2019-04-15 | End: 2019-04-15

## 2019-04-15 RX ORDER — PHENAZOPYRIDINE HYDROCHLORIDE 200 MG/1
200 TABLET, FILM COATED ORAL 3 TIMES DAILY PRN
Qty: 21 TABLET | Refills: 0 | Status: SHIPPED | OUTPATIENT
Start: 2019-04-15

## 2019-04-15 RX ORDER — ONDANSETRON 2 MG/ML
4 INJECTION INTRAMUSCULAR; INTRAVENOUS DAILY PRN
Status: DISCONTINUED | OUTPATIENT
Start: 2019-04-15 | End: 2019-04-15 | Stop reason: HOSPADM

## 2019-04-15 RX ORDER — SODIUM CHLORIDE, SODIUM LACTATE, POTASSIUM CHLORIDE, CALCIUM CHLORIDE 600; 310; 30; 20 MG/100ML; MG/100ML; MG/100ML; MG/100ML
INJECTION, SOLUTION INTRAVENOUS CONTINUOUS PRN
Status: DISCONTINUED | OUTPATIENT
Start: 2019-04-15 | End: 2019-04-15

## 2019-04-15 RX ORDER — CIPROFLOXACIN 2 MG/ML
400 INJECTION, SOLUTION INTRAVENOUS
Status: COMPLETED | OUTPATIENT
Start: 2019-04-15 | End: 2019-04-15

## 2019-04-15 RX ORDER — PHENAZOPYRIDINE HYDROCHLORIDE 100 MG/1
200 TABLET, FILM COATED ORAL ONCE
Status: COMPLETED | OUTPATIENT
Start: 2019-04-15 | End: 2019-04-15

## 2019-04-15 RX ORDER — PHENYLEPHRINE HYDROCHLORIDE 10 MG/ML
INJECTION INTRAVENOUS
Status: DISCONTINUED | OUTPATIENT
Start: 2019-04-15 | End: 2019-04-15

## 2019-04-15 RX ORDER — SODIUM CHLORIDE 0.9 % (FLUSH) 0.9 %
10 SYRINGE (ML) INJECTION
Status: DISCONTINUED | OUTPATIENT
Start: 2019-04-15 | End: 2019-04-15 | Stop reason: HOSPADM

## 2019-04-15 RX ORDER — ONDANSETRON 2 MG/ML
INJECTION INTRAMUSCULAR; INTRAVENOUS
Status: DISCONTINUED | OUTPATIENT
Start: 2019-04-15 | End: 2019-04-15

## 2019-04-15 RX ORDER — HYDROMORPHONE HYDROCHLORIDE 2 MG/ML
0.2 INJECTION, SOLUTION INTRAMUSCULAR; INTRAVENOUS; SUBCUTANEOUS EVERY 5 MIN PRN
Status: DISCONTINUED | OUTPATIENT
Start: 2019-04-15 | End: 2019-04-15 | Stop reason: HOSPADM

## 2019-04-15 RX ORDER — LIDOCAINE HCL/PF 100 MG/5ML
SYRINGE (ML) INTRAVENOUS
Status: DISCONTINUED | OUTPATIENT
Start: 2019-04-15 | End: 2019-04-15

## 2019-04-15 RX ORDER — HYDROCODONE BITARTRATE AND ACETAMINOPHEN 5; 325 MG/1; MG/1
1 TABLET ORAL EVERY 4 HOURS PRN
Status: DISCONTINUED | OUTPATIENT
Start: 2019-04-15 | End: 2019-04-15 | Stop reason: HOSPADM

## 2019-04-15 RX ORDER — FENTANYL CITRATE 50 UG/ML
INJECTION, SOLUTION INTRAMUSCULAR; INTRAVENOUS
Status: DISCONTINUED | OUTPATIENT
Start: 2019-04-15 | End: 2019-04-15

## 2019-04-15 RX ORDER — GLYCOPYRROLATE 0.2 MG/ML
INJECTION INTRAMUSCULAR; INTRAVENOUS
Status: DISCONTINUED | OUTPATIENT
Start: 2019-04-15 | End: 2019-04-15

## 2019-04-15 RX ADMIN — FENTANYL CITRATE 25 MCG: 50 INJECTION INTRAMUSCULAR; INTRAVENOUS at 07:04

## 2019-04-15 RX ADMIN — PHENYLEPHRINE HYDROCHLORIDE 50 MCG: 10 INJECTION INTRAVENOUS at 07:04

## 2019-04-15 RX ADMIN — SODIUM CHLORIDE, SODIUM LACTATE, POTASSIUM CHLORIDE, AND CALCIUM CHLORIDE: .6; .31; .03; .02 INJECTION, SOLUTION INTRAVENOUS at 06:04

## 2019-04-15 RX ADMIN — PHENAZOPYRIDINE HYDROCHLORIDE 200 MG: 100 TABLET ORAL at 08:04

## 2019-04-15 RX ADMIN — GLYCOPYRROLATE 0.2 MG: 0.2 INJECTION, SOLUTION INTRAMUSCULAR; INTRAVENOUS at 07:04

## 2019-04-15 RX ADMIN — PHENYLEPHRINE HYDROCHLORIDE 100 MCG: 10 INJECTION INTRAVENOUS at 07:04

## 2019-04-15 RX ADMIN — PROPOFOL 150 MG: 10 INJECTION, EMULSION INTRAVENOUS at 07:04

## 2019-04-15 RX ADMIN — LIDOCAINE HYDROCHLORIDE 80 MG: 20 INJECTION, SOLUTION INTRAVENOUS at 07:04

## 2019-04-15 RX ADMIN — FENTANYL CITRATE 50 MCG: 50 INJECTION INTRAMUSCULAR; INTRAVENOUS at 07:04

## 2019-04-15 RX ADMIN — CIPROFLOXACIN 400 MG: 2 INJECTION, SOLUTION INTRAVENOUS at 07:04

## 2019-04-15 RX ADMIN — GLYCOPYRROLATE 0.1 MG: 0.2 INJECTION, SOLUTION INTRAMUSCULAR; INTRAVENOUS at 07:04

## 2019-04-15 RX ADMIN — ONDANSETRON 4 MG: 2 INJECTION, SOLUTION INTRAMUSCULAR; INTRAVENOUS at 07:04

## 2019-04-15 NOTE — OP NOTE
DATE OF PROCEDURE:  04/15/2019      PREOPERATIVE DIAGNOSIS: Bilateral Hydronephrosis, Prostate Cancer     POSTOPERATIVE DIAGNOSIS: Same     PROCEDURE PERFORMED: Cystoscopy with Bilateral Retrograde Pyelogram, Fluroloscopy     PRIMARY SURGEON:  Geovani Bardales M.D.     ANESTHESIA:  General.     ESTIMATED BLOOD LOSS:  Minimal.     DRAINS:  None.     COMPLICATIONS:  None.      INDICATIONS:  Obed Sood  is a 78 y.o. male with history prostate cancer causing bilateral hydroneprhosis.  He is here today for Cystoscopy with stent exchange.  His last stent placement was in January 2019.    PROCEDURE:   Obed Sood  was taken to the Operating Room where he was positively   identified by sarah.  He was placed supine on the operating room table.    Following induction of adequate general anesthesia, he was placed in the dorsal   lithotomy position and his external genitalia were prepped and draped in the   usual sterile fashion.     A preoperative timeout was performed as well as confirmation of preoperative   antibiotics.    A  film was taken using fluoroscopy.     A 22-Occitan rigid cystoscope was then passed per urethra into the bladder under   direct vision.  There were no urethral lesions seen.  The prostate had moderate prostatic hypertrophy.  The trigone has been replaced by prostate tumor and is very abnormal in appearance.   The bladder was inspected and shown to have moderate trabeculation.  Both ureteral orifices were identified.  They were seen to be in orthotopic position and contained a previously placed stent.       The right sided stent was grasped and taken down to the urethral meatus without difficulty.  A 0.035 inch Benston wire was then passed though the stent up to the level of the right kidney.  The stent was with drawn.      A 5-Fr open ended catheter was passed over the wire to mid-distal ureter and the wire was withdrawn.  A retrograde pyelogram was performed.  Contrast was  seen filling the ureter and pyelocaliceal system.      There was no filling defects seen.  There were area of mid-ureteral narrowing and mild hydronephrosis.  The wire was passed up to the kidney and the catheter was withdrawn.       A 6 Fr x26 cm JJ Stent: without string was passed over the wire.  A coil was deployed in the kidney seen on fluoroscopy and a coil was deployed in the bladder seen: on fluoroscopy.     The cystoscope was passed back into the bladder.    The left sided stent was grasped and taken down to the urethral meatus without difficulty.  A 0.035 inch GlassBox wire was then passed though the stent up to the level of the leftt kidney.  The stent was with drawn.      A 5-Fr open ended catheter was passed over the wire to mid-distal ureter and the wire was withdrawn.  A retrograde pyelogram was performed.  Contrast was seen filling the ureter and pyelocaliceal system.      There was no filling defects seen.  There were area of mid-ureteral narrowing and mild hydronephrosis.  The wire was passed up to the kidney and the catheter was withdrawn.       A 6 Fr x26 cm JJ Stent: without string was passed over the wire.  A coil was deployed in the kidney seen on fluoroscopy and a coil was deployed in the bladder seen: on fluoroscopy.    The scope was passed once more to visually confirm proper placement of the stent.  The scope was then withdrawn.    A 16 Fr Escalante was placed without difficulty for postoperative drainage.     His anesthesia was reversed.  He was taken to the Recovery Room in stable   condition.

## 2019-04-15 NOTE — ANESTHESIA PREPROCEDURE EVALUATION
04/15/2019  Obed Sood is a 78 y.o., male.    Pre-op Assessment    I have reviewed the Patient Summary Reports.     I have reviewed the Nursing Notes.      Review of Systems  Anesthesia Hx:  No problems with previous Anesthesia  Denies Family Hx of Anesthesia complications.   Denies Personal Hx of Anesthesia complications.   Social:  Former Smoker    Cardiovascular:   Exercise tolerance: poor Denies Pacemaker. Hypertension  Denies Valvular problems/Murmurs.  Denies MI.  Denies CAD.    Denies CABG/stent.  Denies Dysrhythmias.      hyperlipidemia Echo 11/2018:  WNL  Lower extremity edema, at baseline    Pt doesn't have much exercise tolerance secondary to leg swelling   Pulmonary:  Pulmonary Normal    Renal/:   Chronic Renal Disease BPH Hydronephrosis; arias   Hepatic/GI:  Hepatic/GI Normal    Neurological:  Neurology Normal    Endocrine:  Endocrine Normal        Physical Exam  General:  Well nourished    Airway/Jaw/Neck:  Airway Findings: Tongue: Normal Mallampati: III  TM Distance: 4 - 6 cm      Dental:  Dental Findings: Edentulous   Chest/Lungs:  Chest/Lungs Findings: Clear to auscultation, Normal Respiratory Rate     Heart/Vascular:  Heart Findings: Rate: Normal  Rhythm: Regular Rhythm        Mental Status:  Mental Status Findings:  Cooperative, Alert and Oriented       Wt Readings from Last 3 Encounters:   04/08/19 73.1 kg (161 lb 2.5 oz)   03/15/19 77.8 kg (171 lb 8.3 oz)   03/13/19 74.8 kg (165 lb)     Temp Readings from Last 3 Encounters:   04/15/19 36.5 °C (97.7 °F) (Oral)   04/08/19 36.3 °C (97.4 °F) (Oral)   03/26/19 36.7 °C (98.1 °F) (Oral)     BP Readings from Last 3 Encounters:   04/15/19 134/62   04/08/19 132/68   03/26/19 (!) 110/58     Pulse Readings from Last 3 Encounters:   04/15/19 65   04/08/19 84   03/26/19 72     Lab Results   Component Value Date    WBC 9.89 03/17/2019    HGB  10.5 (L) 03/17/2019    HCT 32.6 (L) 03/17/2019    MCV 88 03/17/2019     03/17/2019     CMP  Sodium   Date Value Ref Range Status   03/17/2019 135 (L) 136 - 145 mmol/L Final     Potassium   Date Value Ref Range Status   03/17/2019 3.6 3.5 - 5.1 mmol/L Final     Chloride   Date Value Ref Range Status   03/17/2019 106 95 - 110 mmol/L Final     CO2   Date Value Ref Range Status   03/17/2019 23 23 - 29 mmol/L Final     Glucose   Date Value Ref Range Status   03/17/2019 103 70 - 110 mg/dL Final     BUN, Bld   Date Value Ref Range Status   03/17/2019 24 (H) 8 - 23 mg/dL Final     Creatinine   Date Value Ref Range Status   03/17/2019 1.2 0.5 - 1.4 mg/dL Final     Calcium   Date Value Ref Range Status   03/17/2019 8.2 (L) 8.7 - 10.5 mg/dL Final     Total Protein   Date Value Ref Range Status   03/17/2019 5.4 (L) 6.0 - 8.4 g/dL Final     Albumin   Date Value Ref Range Status   03/17/2019 2.2 (L) 3.5 - 5.2 g/dL Final     Total Bilirubin   Date Value Ref Range Status   03/17/2019 0.6 0.1 - 1.0 mg/dL Final     Comment:     For infants and newborns, interpretation of results should be based  on gestational age, weight and in agreement with clinical  observations.  Premature Infant recommended reference ranges:  Up to 24 hours.............<8.0 mg/dL  Up to 48 hours............<12.0 mg/dL  3-5 days..................<15.0 mg/dL  6-29 days.................<15.0 mg/dL       Alkaline Phosphatase   Date Value Ref Range Status   03/17/2019 237 (H) 55 - 135 U/L Final     AST   Date Value Ref Range Status   03/17/2019 81 (H) 10 - 40 U/L Final     ALT   Date Value Ref Range Status   03/17/2019 88 (H) 10 - 44 U/L Final     Anion Gap   Date Value Ref Range Status   03/17/2019 6 (L) 8 - 16 mmol/L Final     eGFR if    Date Value Ref Range Status   03/17/2019 >60 >60 mL/min/1.73 m^2 Final     eGFR if non    Date Value Ref Range Status   03/17/2019 58 (A) >60 mL/min/1.73 m^2 Final     Comment:      Calculation used to obtain the estimated glomerular filtration  rate (eGFR) is the CKD-EPI equation.            Anesthesia Plan  Type of Anesthesia, risks & benefits discussed:  Anesthesia Type:  general  Patient's Preference:   Intra-op Monitoring Plan: standard ASA monitors  Intra-op Monitoring Plan Comments:   Post Op Pain Control Plan: multimodal analgesia and per primary service following discharge from PACU  Post Op Pain Control Plan Comments:   Induction:   IV  Beta Blocker:  Patient is not currently on a Beta-Blocker (No further documentation required).       Informed Consent: Patient understands risks and agrees with Anesthesia plan.  Questions answered. Anesthesia consent signed with patient.  ASA Score: 3     Day of Surgery Review of History & Physical: I have interviewed and examined the patient. I have reviewed the patient's H&P dated:    H&P update referred to the surgeon.         Ready For Surgery From Anesthesia Perspective.

## 2019-04-15 NOTE — DISCHARGE INSTRUCTIONS
ACTIVITY LEVEL: If you have received sedation or an anesthetic, you may feel sleepy for several hours. Rest until you are more awake. Gradually resume your normal activities.       DIET: You may resume your home diet. If nausea is present, increase your diet gradually with fluids and bland foods.      Medications: Pain medication should be taken only if needed and as directed. If antibiotics are prescribed, the medication should be taken until completed. You will be given an updated list of you medications.    Last dose of Pyridium 8:19 am     ? No driving, alcoholic beverages or signing legal documents for next 24 hours or while taking pain medication        CALL THE DOCTOR:     · Fever over 101°F  · Severe pain that doesnt go away with medication.  · Upset stomach and vomiting that is persistent.  · Problems urinating-unable to urinate or heavy bleeding (with or without clots)     Fall Prevention  Millions of people fall every year and injure themselves. You may have had anesthesia or sedation which may increase your risk of falling. You may have health issues that put you at an increased risk of falling.     Here are ways to reduce your risk of falling.  ·   · Make your home safe by keeping walkways clear of objects you may trip over.  · Use non-slip pads under rugs. Do not use area rugs or small throw rugs.  · Use non-slip mats in bathtubs and showers.  · Install handrails and lights on staircases.  · Do not walk in poorly lit areas.  · Do not stand on chairs or wobbly ladders.  · Use caution when reaching overhead or looking upward. This position can cause a loss of balance.  · Be sure your shoes fit properly, have non-slip bottoms and are in good condition.   · Wear shoes both inside and out. Avoid going barefoot or wearing slippers.  · Be cautious when going up and down stairs, curbs, and when walking on uneven sidewalks.  · If your balance is poor, consider using a cane or walker.  · If your fall was  related to alcohol use, stop or limit alcohol intake.   · If your fall was related to use of sleeping medicines, talk to your doctor about this. You may need to reduce your dosage at bedtime if you awaken during the night to go to the bathroom.    · To reduce the need for nighttime bathroom trips:  ¨ Avoid drinking fluids for several hours before going to bed  ¨ Empty your bladder before going to bed  ¨ Men can keep a urinal at the bedside  · Stay as active as you can. Balance, flexibility, strength, and endurance all come from exercise. They all play a role in preventing falls. Ask your healthcare provider which types of activity are right for you.  · Get your vision checked on a regular basis.  · If you have pets, know where they are before you stand up or walk so you don't trip over them.  Use night lights.

## 2019-04-15 NOTE — DISCHARGE SUMMARY
[facility]  Discharge Note  Short Stay    Admit Date: 4/15/2019    Discharge Date and Time: 04/15/2019  7:58 AM      Attending Physician: ROSINA Bardales MD     Discharge Provider: HECTOR Bardales    Diagnoses:  Active Hospital Problems    Diagnosis  POA    *Bilateral hydronephrosis [N13.30]  Yes      Resolved Hospital Problems   No resolved problems to display.       Discharged Condition: stable    Hospital Course: Patient was admitted for an outpatient procedure and tolerated the procedure well with no complications.    Final Diagnoses: Same as principal problem.    Disposition: Home or Self Care    Follow up/Patient Instructions:    Medications:  Reconciled Home Medications:      Medication List      START taking these medications    phenazopyridine 200 MG tablet  Commonly known as:  PYRIDIUM  Take 1 tablet (200 mg total) by mouth 3 (three) times daily as needed for Pain (Burning).        CONTINUE taking these medications    bicalutamide 50 MG Tab  Commonly known as:  CASODEX  Take 1 tablet (50 mg total) by mouth once daily.     bisacodyl 10 mg Supp  Commonly known as:  DULCOLAX  Place 1 suppository (10 mg total) rectally once daily.     leuprolide (6 month) 45 mg injection  Commonly known as:  ELIGARD  Inject 45 mg into the skin every 6 (six) months.     saw palmetto 160 MG capsule  Take 160 mg by mouth 2 (two) times daily.     tamsulosin 0.4 mg Cap  Commonly known as:  FLOMAX  Take 1 capsule (0.4 mg total) by mouth once daily.     triptorelin pamoate 11.25 mg Susr  Commonly known as:  TRELSTAR  Inject 11.25 mg into the muscle every 12 weeks.          Discharge Procedure Orders   Diet general     Call MD for:   Order Comments: Significant Hematuria         Discharge Procedure Orders (must include Diet, Follow-up, Activity):   Discharge Procedure Orders (must include Diet, Follow-up, Activity)   Diet general     Call MD for:   Order Comments: Significant Hematuria

## 2019-04-15 NOTE — BRIEF OP NOTE
[facility]  Brief Operative Note     SUMMARY     Surgery Date: 4/15/2019     Surgeon(s) and Role:     * ROSINA Bardales MD - Primary    Assisting Surgeon: None    Pre-op Diagnosis:  Bilateral hydronephrosis [N13.30]    Post-op Diagnosis:  Post-Op Diagnosis Codes:     * Bilateral hydronephrosis [N13.30]    Procedure(s) (LRB):  CYSTOSCOPY, WITH URETERAL STENT INSERTION (Bilateral)    Anesthesia: General    Description of the findings of the procedure: stents exchanged, 6x26 JJ stents bilaterally    Findings/Key Components: abnormal trigone due to cancer    Estimated Blood Loss: * No values recorded between 4/15/2019  7:40 AM and 4/15/2019  7:57 AM *         Specimens:   Specimen (12h ago, onward)    Start     Ordered    04/15/19 0752  Specimen to Pathology - Surgery  Once     Comments:  Bilateral ureteral stents**For gross only**     Start Status     04/15/19 0752 Needs to be Collected Order ID: [id number]       04/15/19 0752          Discharge Note    SUMMARY     Admit Date: 4/15/2019    Discharge Date and Time:  04/15/2019 7:57 AM    Hospital Course (synopsis of major diagnoses, care, treatment, and services provided during the course of the hospital stay): Patient was admitted for an outpatient procedure and tolerated the procedure well with no complications.      Final Diagnosis: Post-Op Diagnosis Codes:     * Bilateral hydronephrosis [N13.30]    Disposition: Home or Self Care    Follow Up/Patient Instructions:     Medications:  Reconciled Home Medications:      Medication List      START taking these medications    phenazopyridine 200 MG tablet  Commonly known as:  PYRIDIUM  Take 1 tablet (200 mg total) by mouth 3 (three) times daily as needed for Pain (Burning).        CONTINUE taking these medications    bicalutamide 50 MG Tab  Commonly known as:  CASODEX  Take 1 tablet (50 mg total) by mouth once daily.     bisacodyl 10 mg Supp  Commonly known as:  DULCOLAX  Place 1 suppository (10 mg  total) rectally once daily.     leuprolide (6 month) 45 mg injection  Commonly known as:  ELIGARD  Inject 45 mg into the skin every 6 (six) months.     saw palmetto 160 MG capsule  Take 160 mg by mouth 2 (two) times daily.     tamsulosin 0.4 mg Cap  Commonly known as:  FLOMAX  Take 1 capsule (0.4 mg total) by mouth once daily.     triptorelin pamoate 11.25 mg Susr  Commonly known as:  TRELSTAR  Inject 11.25 mg into the muscle every 12 weeks.          Discharge Procedure Orders   Diet general     Call MD for:   Order Comments: Significant Hematuria

## 2019-04-15 NOTE — ANESTHESIA POSTPROCEDURE EVALUATION
Anesthesia Post Evaluation    Patient: Obed Sood    Procedure(s) Performed: Procedure(s) (LRB):  CYSTOSCOPY, WITH RETROGRADE PYELOGRAM AND URETERAL STENT EXCHANGE (Bilateral)    Final Anesthesia Type: general  Patient location during evaluation: PACU  Patient participation: Yes- Able to Participate  Level of consciousness: awake and alert  Post-procedure vital signs: reviewed and stable  Pain management: adequate  PONV status at discharge: No PONV  Anesthetic complications: no      Cardiovascular status: hemodynamically stable  Respiratory status: unassisted and spontaneous ventilation  Hydration status: euvolemic  Follow-up not needed.          Vitals Value Taken Time   /58 4/15/2019  8:49 AM   Temp 36.5 °C (97.7 °F) 4/15/2019  8:49 AM   Pulse 71 4/15/2019  8:49 AM   Resp 16 4/15/2019  8:49 AM   SpO2 97 % 4/15/2019  8:49 AM         Event Time     Out of Recovery 08:46:47          Pain/Andrew Score: Andrew Score: 10 (4/15/2019  8:49 AM)

## 2019-04-15 NOTE — TRANSFER OF CARE
Anesthesia Transfer of Care Note    Patient: Obed Sood    Procedure(s) Performed: Procedure(s) (LRB):  CYSTOSCOPY, WITH URETERAL STENT INSERTION (Bilateral)    Patient location: PACU    Anesthesia Type: general    Transport from OR: Transported from OR on room air with adequate spontaneous ventilation    Post pain: adequate analgesia    Post assessment: no apparent anesthetic complications and tolerated procedure well    Post vital signs: stable    Level of consciousness: awake, alert and oriented    Nausea/Vomiting: no nausea/vomiting    Complications: none    Transfer of care protocol was followed      Last vitals:   Visit Vitals  Blood Pressure 137/60 (BP Location: Right arm, Patient Position: Lying)   Pulse 78   Temperature 36.6 °C (97.9 °F) (Oral)   Respiration 16   Oxygen Saturation 99%

## 2019-04-22 ENCOUNTER — LAB VISIT (OUTPATIENT)
Dept: LAB | Facility: HOSPITAL | Age: 79
End: 2019-04-22
Attending: UROLOGY
Payer: MEDICARE

## 2019-04-22 DIAGNOSIS — C61 PROSTATE CANCER: ICD-10-CM

## 2019-04-22 DIAGNOSIS — N13.30 BILATERAL HYDRONEPHROSIS: ICD-10-CM

## 2019-04-22 LAB
ANION GAP SERPL CALC-SCNC: 10 MMOL/L (ref 8–16)
BASOPHILS # BLD AUTO: 0.02 K/UL (ref 0–0.2)
BASOPHILS NFR BLD: 0.2 % (ref 0–1.9)
BUN SERPL-MCNC: 30 MG/DL (ref 8–23)
CALCIUM SERPL-MCNC: 9.6 MG/DL (ref 8.7–10.5)
CHLORIDE SERPL-SCNC: 103 MMOL/L (ref 95–110)
CO2 SERPL-SCNC: 25 MMOL/L (ref 23–29)
COMPLEXED PSA SERPL-MCNC: 3.4 NG/ML (ref 0–4)
CREAT SERPL-MCNC: 1.6 MG/DL (ref 0.5–1.4)
DIFFERENTIAL METHOD: ABNORMAL
EOSINOPHIL # BLD AUTO: 0.1 K/UL (ref 0–0.5)
EOSINOPHIL NFR BLD: 1.2 % (ref 0–8)
ERYTHROCYTE [DISTWIDTH] IN BLOOD BY AUTOMATED COUNT: 16.2 % (ref 11.5–14.5)
EST. GFR  (AFRICAN AMERICAN): 47 ML/MIN/1.73 M^2
EST. GFR  (NON AFRICAN AMERICAN): 41 ML/MIN/1.73 M^2
GLUCOSE SERPL-MCNC: 140 MG/DL (ref 70–110)
HCT VFR BLD AUTO: 37 % (ref 40–54)
HGB BLD-MCNC: 11.6 G/DL (ref 14–18)
LYMPHOCYTES # BLD AUTO: 1.1 K/UL (ref 1–4.8)
LYMPHOCYTES NFR BLD: 9.7 % (ref 18–48)
MCH RBC QN AUTO: 28 PG (ref 27–31)
MCHC RBC AUTO-ENTMCNC: 31.4 G/DL (ref 32–36)
MCV RBC AUTO: 89 FL (ref 82–98)
MONOCYTES # BLD AUTO: 0.6 K/UL (ref 0.3–1)
MONOCYTES NFR BLD: 4.9 % (ref 4–15)
NEUTROPHILS # BLD AUTO: 9.8 K/UL (ref 1.8–7.7)
NEUTROPHILS NFR BLD: 84 % (ref 38–73)
PLATELET # BLD AUTO: 281 K/UL (ref 150–350)
PMV BLD AUTO: 9.6 FL (ref 9.2–12.9)
POTASSIUM SERPL-SCNC: 4.2 MMOL/L (ref 3.5–5.1)
RBC # BLD AUTO: 4.14 M/UL (ref 4.6–6.2)
SODIUM SERPL-SCNC: 138 MMOL/L (ref 136–145)
TESTOST SERPL-MCNC: 9 NG/DL (ref 304–1227)
WBC # BLD AUTO: 11.7 K/UL (ref 3.9–12.7)

## 2019-04-22 PROCEDURE — 84403 ASSAY OF TOTAL TESTOSTERONE: CPT

## 2019-04-22 PROCEDURE — 84153 ASSAY OF PSA TOTAL: CPT

## 2019-04-22 PROCEDURE — 80048 BASIC METABOLIC PNL TOTAL CA: CPT

## 2019-04-22 PROCEDURE — 36415 COLL VENOUS BLD VENIPUNCTURE: CPT

## 2019-04-22 PROCEDURE — 85025 COMPLETE CBC W/AUTO DIFF WBC: CPT

## 2019-04-29 ENCOUNTER — OFFICE VISIT (OUTPATIENT)
Dept: UROLOGY | Facility: CLINIC | Age: 79
End: 2019-04-29
Payer: MEDICARE

## 2019-04-29 VITALS — WEIGHT: 163.81 LBS | BODY MASS INDEX: 24.26 KG/M2 | HEIGHT: 69 IN

## 2019-04-29 DIAGNOSIS — N13.30 BILATERAL HYDRONEPHROSIS: ICD-10-CM

## 2019-04-29 DIAGNOSIS — R33.9 URINARY RETENTION: ICD-10-CM

## 2019-04-29 DIAGNOSIS — C61 STAGE IV ADENOCARCINOMA OF PROSTATE: Primary | ICD-10-CM

## 2019-04-29 PROBLEM — N50.89 SCROTAL SWELLING: Status: RESOLVED | Noted: 2019-01-04 | Resolved: 2019-04-29

## 2019-04-29 PROBLEM — R97.20 ELEVATED PSA: Status: RESOLVED | Noted: 2019-01-10 | Resolved: 2019-04-29

## 2019-04-29 PROCEDURE — 99214 PR OFFICE/OUTPT VISIT, EST, LEVL IV, 30-39 MIN: ICD-10-PCS | Mod: S$PBB,,, | Performed by: UROLOGY

## 2019-04-29 PROCEDURE — 99999 PR PBB SHADOW E&M-EST. PATIENT-LVL III: CPT | Mod: PBBFAC,,, | Performed by: UROLOGY

## 2019-04-29 PROCEDURE — 96402 CHEMO HORMON ANTINEOPL SQ/IM: CPT | Mod: PBBFAC

## 2019-04-29 PROCEDURE — 99999 PR PBB SHADOW E&M-EST. PATIENT-LVL III: ICD-10-PCS | Mod: PBBFAC,,, | Performed by: UROLOGY

## 2019-04-29 PROCEDURE — 96372 THER/PROPH/DIAG INJ SC/IM: CPT | Mod: PBBFAC

## 2019-04-29 PROCEDURE — 99213 OFFICE O/P EST LOW 20 MIN: CPT | Mod: PBBFAC | Performed by: UROLOGY

## 2019-04-29 PROCEDURE — 99214 OFFICE O/P EST MOD 30 MIN: CPT | Mod: S$PBB,,, | Performed by: UROLOGY

## 2019-04-29 RX ORDER — LACTULOSE 10 G/15ML
SOLUTION ORAL; RECTAL
Refills: 2 | COMMUNITY
Start: 2019-02-19

## 2019-04-29 RX ORDER — ACETAMINOPHEN AND CODEINE PHOSPHATE 300; 30 MG/1; MG/1
TABLET ORAL
Refills: 1 | COMMUNITY
Start: 2019-02-07

## 2019-04-29 RX ORDER — CIPROFLOXACIN 250 MG/1
TABLET, FILM COATED ORAL
Refills: 0 | COMMUNITY
Start: 2019-04-25

## 2019-04-29 RX ORDER — LIDOCAINE HYDROCHLORIDE 20 MG/ML
JELLY TOPICAL
Qty: 10 ML | Refills: 12 | Status: SHIPPED | OUTPATIENT
Start: 2019-04-29

## 2019-04-29 RX ADMIN — TRIPTORELIN PAMOATE 11.3 MG: KIT at 03:04

## 2019-04-29 NOTE — PROGRESS NOTES
Subjective:       Patient ID: Obed Sood is a 78 y.o. male who was last seen in this office 3/13/2019    Chief Complaint:   Chief Complaint   Patient presents with    Follow-up     follow up with labwork an trelstar injection pt does have arias an home health nurse will come out to change     Metastatic Prostate Cancer  Patient was admitted with ARF and LE edema in January 2019. He was seen as an inpatient consult by Dr. Spivey on 1/10/19 for acute kidney injury with hydronephrosis. He was also found to have an elevated PSA of 97.2.  He has no known prostate cancer issues though does report in 1990s he was recommended for PBx but declined at that time. He notes mild urinary issues. Denies hematuria.      PVR 1/10/19-- 400cc. Arias catheter placed in ER with return of 400cc yellow urine.     CT scan -- nodular, enlarged prostate with extensive LAD. Mild bilateral hydroureteronephrosis with some tortuosity of ureters. Thickened bladder with moderate distention.   Hydronephrosis remained persistent despite Arias catheter. Therefore, he underwent cystoscopy/bilateral RPG/bilateral stent placement on 1/14/19 by Dr. Bardales.    He also underwent prostate biopsy on 1/14/19. His pathology showed: prostate cancer. He was started on Casodex during recent admission. He received Trelstar 11.25mg injection on 1/28/19. Denies bone pain or loss of appetite    He had a voiding trial in this office on 2/12/19 which was unsuccessful. Arias replaced. He is here today for a repeat voiding trial. He is tolerating his arias and stents. Denies gross hematuria or flank pain. He reports improvement in fabien LE edema. He is able to ambulate better without walker. No new issues    He is s/p bilateral ureteral stent exchange on 4/15/2019.  He had an infection afterwards, but he feels better today.      ACTIVE MEDICAL ISSUES:  Patient Active Problem List   Diagnosis    Elevated blood pressure reading    Nocturia    Benign prostatic  hyperplasia    Hyperlipidemia LDL goal <100    Elevated blood pressure reading    HTN, goal below 140/90    Bilateral leg edema    Leg edema, right    Enlarged prostate with urinary obstruction    Lymphedema    Abdominal pain    MIAN (acute kidney injury)    Bilateral hydronephrosis    Gross hematuria    Urinary retention    Prostate cancer    Hypervolemia    Moderate protein-calorie malnutrition    Slow transit constipation    Stage IV adenocarcinoma of prostate    Sepsis    CKD (chronic kidney disease) stage 3, GFR 30-59 ml/min    UTI (urinary tract infection) due to Enterococcus    Sepsis due to Enterococcus    Moderate malnutrition       ALLERGIES AND MEDICATIONS: updated and reviewed.  Review of patient's allergies indicates:  No Known Allergies  Current Outpatient Medications   Medication Sig    acetaminophen-codeine 300-30mg (TYLENOL #3) 300-30 mg Tab TK 1 T PO Q 6 H PRN    bisacodyl (DULCOLAX) 10 mg Supp Place 1 suppository (10 mg total) rectally once daily.    ciprofloxacin HCl (CIPRO) 250 MG tablet TK 1 T PO Q 12 H FOR 10 DAYS    lactulose (CHRONULAC) 10 gram/15 mL solution TK 30 ML PO BID PRN    phenazopyridine (PYRIDIUM) 200 MG tablet Take 1 tablet (200 mg total) by mouth 3 (three) times daily as needed for Pain (Burning).    saw palmetto 160 MG capsule Take 160 mg by mouth 2 (two) times daily.    tamsulosin (FLOMAX) 0.4 mg Cap Take 1 capsule (0.4 mg total) by mouth once daily.    triptorelin pamoate (TRELSTAR) 11.25 mg SusR Inject 11.25 mg into the muscle every 12 weeks.    bicalutamide (CASODEX) 50 MG Tab Take 1 tablet (50 mg total) by mouth once daily.    lidocaine HCl 2% (XYLOCAINE) 2 % JelP urojet by Mucous Membrane route every 30 days. Use with Escalante change     No current facility-administered medications for this visit.        Review of Systems   Constitutional: Negative for activity change, fatigue, fever and unexpected weight change.   HENT: Negative for  "congestion.    Eyes: Negative for redness.   Respiratory: Negative for chest tightness and shortness of breath.    Cardiovascular: Negative for chest pain and leg swelling.   Gastrointestinal: Negative for abdominal pain, constipation, diarrhea, nausea and vomiting.   Genitourinary: Negative for dysuria, flank pain, frequency, hematuria, penile pain, penile swelling, scrotal swelling, testicular pain and urgency.   Musculoskeletal: Negative for arthralgias and back pain.   Neurological: Negative for dizziness and light-headedness.   Psychiatric/Behavioral: Negative for behavioral problems and confusion. The patient is not nervous/anxious.    All other systems reviewed and are negative.      Objective:      Vitals:    04/29/19 1418   Weight: 74.3 kg (163 lb 12.8 oz)   Height: 5' 9" (1.753 m)     Physical Exam   Nursing note and vitals reviewed.  Constitutional: He is oriented to person, place, and time. He appears well-developed and well-nourished.   HENT:   Head: Normocephalic.   Eyes: Conjunctivae are normal.   Neck: Normal range of motion. Neck supple. No tracheal deviation present. No thyromegaly present.   Cardiovascular: Normal rate and normal heart sounds.    Pulmonary/Chest: Effort normal and breath sounds normal. No respiratory distress. He has no wheezes.   Abdominal: Soft. Bowel sounds are normal. There is no hepatosplenomegaly. There is no tenderness. There is no rebound and no CVA tenderness. No hernia.   Musculoskeletal: Normal range of motion. He exhibits no edema or tenderness.   Lymphadenopathy:     He has no cervical adenopathy.   Neurological: He is alert and oriented to person, place, and time.   Skin: Skin is warm and dry. No rash noted. No erythema.     Psychiatric: He has a normal mood and affect. His behavior is normal. Judgment and thought content normal.     PSA DIAGNOSTIC 0.00 - 4.00 ng/mL 3.4    Comment: PSA Expected levels:   Hormonal Therapy: <0.05 ng/ml   Prostatectomy: <0.01 ng/ml "   Radiation Therapy: <1.00 ng/ml    Resulting Agency  [facility]         Specimen Collected: 04/22/19 08:51   Last Resulted: 04/22/19 17:25          Assessment:       1. Stage IV adenocarcinoma of prostate    2. Bilateral hydronephrosis    3. Urinary retention          Plan:       1. Stage IV adenocarcinoma of prostate  Trelstar today  Again in 3 months      2. Bilateral hydronephrosis  Plan to change stents again in July (will plan to use [employer] stents)    3. Urinary retention  HH Escalante change  - lidocaine HCl 2% (XYLOCAINE) 2 % JelP urojet; by Mucous Membrane route every 30 days. Use with Escalante change  Dispense: 10 mL; Refill: 12            Follow up in about 7 weeks (around 6/14/2019).

## 2019-04-29 NOTE — PROGRESS NOTES
Name, , and allergies verified. Patient verbalized understanding of why they are here today. Patient here for Trelstar Injection. Trelstar 11.25 mg given IM in the left gluteus  with no adverse effects. (See MAR)  Patient tolerated procedure well.  Patient was asked to wait an appropriate 15 minutes in ensure that no reaction occurred. Patient was discharged with no acute distress and was instructed to call the office if any questions or concerns arose.

## 2019-04-30 ENCOUNTER — TELEPHONE (OUTPATIENT)
Dept: UROLOGY | Facility: CLINIC | Age: 79
End: 2019-04-30

## 2019-04-30 NOTE — TELEPHONE ENCOUNTER
Spoke to patient's pharmacy- they are unable to get the urojets as well as [facility]- do you have any recommendations on a possible pharmacy that we could check with to see if they can provide the Urojet?

## 2019-04-30 NOTE — TELEPHONE ENCOUNTER
----- Message from Jeannine Roman sent at 4/30/2019 11:27 AM CDT -----  Contact: [facility]  Pharmacist calling to speak to Maria Ines regarding pt medication. [phone]    lidocaine HCl 2% (XYLOCAINE) 2 % Je urojet

## 2019-05-07 ENCOUNTER — TELEPHONE (OUTPATIENT)
Dept: UROLOGY | Facility: CLINIC | Age: 79
End: 2019-05-07

## 2019-05-07 NOTE — TELEPHONE ENCOUNTER
----- Message from Janet Beltre sent at 5/7/2019  8:52 AM CDT -----  Contact: Wife-   Type: Patient Call Back    Who called: Wife - Iman    What is the request in detail:patient's wife called to check the status of the patient getting his special medication the nurse was working on     Can the clinic reply by [facility]? No     Would the patient rather a call back or a response via [facility]? Call     Best call back number: [phone]

## 2019-05-24 DIAGNOSIS — C61 PROSTATE CANCER: Primary | ICD-10-CM

## 2019-06-11 ENCOUNTER — OFFICE VISIT (OUTPATIENT)
Dept: UROLOGY | Facility: CLINIC | Age: 79
End: 2019-06-11
Payer: MEDICARE

## 2019-06-11 ENCOUNTER — TELEPHONE (OUTPATIENT)
Dept: PHARMACY | Facility: CLINIC | Age: 79
End: 2019-06-11

## 2019-06-11 VITALS — WEIGHT: 160 LBS | HEIGHT: 69 IN | BODY MASS INDEX: 23.7 KG/M2 | RESPIRATION RATE: 16 BRPM

## 2019-06-11 DIAGNOSIS — R33.9 URINARY RETENTION: ICD-10-CM

## 2019-06-11 DIAGNOSIS — N40.1 ENLARGED PROSTATE WITH URINARY OBSTRUCTION: ICD-10-CM

## 2019-06-11 DIAGNOSIS — C61 STAGE IV ADENOCARCINOMA OF PROSTATE: Primary | ICD-10-CM

## 2019-06-11 DIAGNOSIS — N13.30 BILATERAL HYDRONEPHROSIS: ICD-10-CM

## 2019-06-11 DIAGNOSIS — N13.8 ENLARGED PROSTATE WITH URINARY OBSTRUCTION: ICD-10-CM

## 2019-06-11 PROBLEM — N40.0 BENIGN PROSTATIC HYPERPLASIA: Status: RESOLVED | Noted: 2018-09-06 | Resolved: 2019-06-11

## 2019-06-11 PROCEDURE — 99214 OFFICE O/P EST MOD 30 MIN: CPT | Mod: S$PBB,,, | Performed by: UROLOGY

## 2019-06-11 PROCEDURE — 99213 OFFICE O/P EST LOW 20 MIN: CPT | Mod: PBBFAC | Performed by: UROLOGY

## 2019-06-11 PROCEDURE — 99999 PR PBB SHADOW E&M-EST. PATIENT-LVL III: CPT | Mod: PBBFAC,,, | Performed by: UROLOGY

## 2019-06-11 PROCEDURE — 99999 PR PBB SHADOW E&M-EST. PATIENT-LVL III: ICD-10-PCS | Mod: PBBFAC,,, | Performed by: UROLOGY

## 2019-06-11 PROCEDURE — 99214 PR OFFICE/OUTPT VISIT, EST, LEVL IV, 30-39 MIN: ICD-10-PCS | Mod: S$PBB,,, | Performed by: UROLOGY

## 2019-06-11 NOTE — H&P
Subjective:       Patient ID: Obed Sood is a 78 y.o. male who was referred by No ref. provider found    Chief Complaint:   Chief Complaint   Patient presents with    Follow-up     patient is here for follow up       Metastatic Prostate Cancer  Patient was admitted with ARF and LE edema in January 2019. He was seen as an inpatient consult by Dr. Spivey on 1/10/19 for acute kidney injury with hydronephrosis. He was also found to have an elevated PSA of 97.2.  He has no known prostate cancer issues though does report in 1990s he was recommended for PBx but declined at that time. He notes mild urinary issues. Denies hematuria.      PVR 1/10/19-- 400cc. Arias catheter placed in ER with return of 400cc yellow urine.     CT scan -- nodular, enlarged prostate with extensive LAD. Mild bilateral hydroureteronephrosis with some tortuosity of ureters. Thickened bladder with moderate distention.   Hydronephrosis remained persistent despite Arias catheter. Therefore, he underwent cystoscopy/bilateral RPG/bilateral stent placement on 1/14/19 by Dr. Bardales.    He also underwent prostate biopsy on 1/14/19. His pathology showed: prostate cancer. He was started on Casodex during recent admission. He received Trelstar 11.25mg injection on 1/28/19. Denies bone pain or loss of appetite    He had a voiding trial in this office on 2/12/19 which was unsuccessful. Arias replaced. He is here today for a repeat voiding trial. He is tolerating his arias and stents. Denies gross hematuria or flank pain. He reports improvement in fabien LE edema. He is able to ambulate better without walker. No new issues    He is s/p bilateral ureteral stent exchange on 4/15/2019.  He had an infection afterwards.  He is now planning to take some antibiotics from Dr. Barker before the next change.  He will be due for a Trelstar/Lupron in the end of July.  He is also due for a stent exchange around the same time. Home health is changing his  Boris.    ACTIVE MEDICAL ISSUES:  Patient Active Problem List   Diagnosis    Elevated blood pressure reading    Nocturia    Hyperlipidemia LDL goal <100    Elevated blood pressure reading    HTN, goal below 140/90    Bilateral leg edema    Leg edema, right    Enlarged prostate with urinary obstruction    Lymphedema    Abdominal pain    MIAN (acute kidney injury)    Bilateral hydronephrosis    Gross hematuria    Urinary retention    Prostate cancer    Hypervolemia    Moderate protein-calorie malnutrition    Slow transit constipation    Stage IV adenocarcinoma of prostate    Sepsis    CKD (chronic kidney disease) stage 3, GFR 30-59 ml/min    UTI (urinary tract infection) due to Enterococcus    Sepsis due to Enterococcus    Moderate malnutrition       PAST MEDICAL HISTORY  Past Medical History:   Diagnosis Date    MIAN (acute kidney injury) 09/10/2018    BPH (benign prostatic hyperplasia)     Cancer     prostate cancer    Edema 2018       PAST SURGICAL HISTORY:  Past Surgical History:   Procedure Laterality Date    BIOPSY, PROSTATE N/A 2019    [facility]    CYSTOSCOPY, WITH RETROGRADE PYELOGRAM AND BILATERAL URETERAL STENT INSERTION Bilateral 2019    [facility]    CYSTOSCOPY, WITH RETROGRADE PYELOGRAM AND URETERAL STENT EXCHANGE Bilateral 4/15/2019    Performed by [facility] at [facility]    HERNIA REPAIR N/A     umbilical    MULTIPLE TOOTH EXTRACTIONS      TONSILLECTOMY Bilateral        SOCIAL HISTORY:  Social History     Tobacco Use    Smoking status: Former Smoker     Packs/day: 0.50     Start date: 1953     Last attempt to quit: 1978     Years since quittin.7    Smokeless tobacco: Former User   Substance Use Topics    Alcohol use: No     Frequency: Never    Drug use: No       FAMILY HISTORY:  Family History   Problem Relation Age of Onset    No Known Problems Mother          93    No Known Problems Father         88: DM    No Known Problems Sister         1 sister     No Known Problems Brother         4 brothers       ALLERGIES AND MEDICATIONS: updated and reviewed.  Review of patient's allergies indicates:  No Known Allergies  Current Outpatient Medications   Medication Sig    acetaminophen-codeine 300-30mg (TYLENOL #3) 300-30 mg Tab TK 1 T PO Q 6 H PRN    bisacodyl (DULCOLAX) 10 mg Supp Place 1 suppository (10 mg total) rectally once daily.    ciprofloxacin HCl (CIPRO) 250 MG tablet TK 1 T PO Q 12 H FOR 10 DAYS    lactulose (CHRONULAC) 10 gram/15 mL solution TK 30 ML PO BID PRN    lidocaine HCl 2% (XYLOCAINE) 2 % JelP urojet by Mucous Membrane route every 30 days. Use with Escalante change    phenazopyridine (PYRIDIUM) 200 MG tablet Take 1 tablet (200 mg total) by mouth 3 (three) times daily as needed for Pain (Burning).    saw palmetto 160 MG capsule Take 160 mg by mouth 2 (two) times daily.    tamsulosin (FLOMAX) 0.4 mg Cap Take 1 capsule (0.4 mg total) by mouth once daily.    bicalutamide (CASODEX) 50 MG Tab Take 1 tablet (50 mg total) by mouth once daily.     No current facility-administered medications for this visit.        Review of Systems   Constitutional: Negative for activity change, fatigue, fever and unexpected weight change.   HENT: Negative for congestion.    Eyes: Negative for redness.   Respiratory: Negative for chest tightness and shortness of breath.    Cardiovascular: Negative for chest pain and leg swelling.   Gastrointestinal: Negative for abdominal pain, constipation, diarrhea, nausea and vomiting.   Genitourinary: Negative for dysuria, flank pain, frequency, hematuria, penile pain, penile swelling, scrotal swelling, testicular pain and urgency.   Musculoskeletal: Negative for arthralgias and back pain.   Neurological: Negative for dizziness and light-headedness.   Psychiatric/Behavioral: Negative for behavioral problems and confusion. The patient  "is not nervous/anxious.    All other systems reviewed and are negative.      Objective:      Vitals:    06/11/19 0913   Resp: 16   Weight: 72.6 kg (160 lb)   Height: 5' 9" (1.753 m)     Physical Exam   Nursing note and vitals reviewed.  Constitutional: He is oriented to person, place, and time. He appears well-developed and well-nourished.   HENT:   Head: Normocephalic.   Eyes: Conjunctivae are normal.   Neck: Normal range of motion. Neck supple. No tracheal deviation present. No thyromegaly present.   Cardiovascular: Normal rate and normal heart sounds.    Pulmonary/Chest: Effort normal and breath sounds normal. No respiratory distress. He has no wheezes.   Abdominal: Soft. Bowel sounds are normal. There is no hepatosplenomegaly. There is no tenderness. There is no rebound and no CVA tenderness. No hernia.   Musculoskeletal: Normal range of motion. He exhibits no edema or tenderness.   Lymphadenopathy:     He has no cervical adenopathy.   Neurological: He is alert and oriented to person, place, and time.   Skin: Skin is warm and dry. No rash noted. No erythema.     Psychiatric: He has a normal mood and affect. His behavior is normal. Judgment and thought content normal.           Assessment:       1. Stage IV adenocarcinoma of prostate    2. Bilateral hydronephrosis    3. Urinary retention    4. Enlarged prostate with urinary obstruction          Plan:       1. Stage IV adenocarcinoma of prostate  Lupron in end of July    2. Bilateral hydronephrosis  Change stents on Friday 7/19/2019  Change Escalante afterwards    3. Urinary retention  Escalante    4. Enlarged prostate with urinary obstruction  As above            No follow-ups on file.    "

## 2019-06-11 NOTE — PROGRESS NOTES
Subjective:       Patient ID: Obed Sood is a 78 y.o. male who was referred by No ref. provider found    Chief Complaint:   Chief Complaint   Patient presents with    Follow-up     patient is here for follow up       Metastatic Prostate Cancer  Patient was admitted with ARF and LE edema in January 2019. He was seen as an inpatient consult by Dr. Spivey on 1/10/19 for acute kidney injury with hydronephrosis. He was also found to have an elevated PSA of 97.2.  He has no known prostate cancer issues though does report in 1990s he was recommended for PBx but declined at that time. He notes mild urinary issues. Denies hematuria.      PVR 1/10/19-- 400cc. Arias catheter placed in ER with return of 400cc yellow urine.     CT scan -- nodular, enlarged prostate with extensive LAD. Mild bilateral hydroureteronephrosis with some tortuosity of ureters. Thickened bladder with moderate distention.   Hydronephrosis remained persistent despite Arias catheter. Therefore, he underwent cystoscopy/bilateral RPG/bilateral stent placement on 1/14/19 by Dr. Bardales.    He also underwent prostate biopsy on 1/14/19. His pathology showed: prostate cancer. He was started on Casodex during recent admission. He received Trelstar 11.25mg injection on 1/28/19. Denies bone pain or loss of appetite    He had a voiding trial in this office on 2/12/19 which was unsuccessful. Arias replaced. He is here today for a repeat voiding trial. He is tolerating his arias and stents. Denies gross hematuria or flank pain. He reports improvement in fabien LE edema. He is able to ambulate better without walker. No new issues    He is s/p bilateral ureteral stent exchange on 4/15/2019.  He had an infection afterwards.  He is now planning to take some antibiotics from Dr. Barker before the next change.  He will be due for a Trelstar/Lupron in the end of July.  He is also due for a stent exchange around the same time. Home health is changing his  Boris.    ACTIVE MEDICAL ISSUES:  Patient Active Problem List   Diagnosis    Elevated blood pressure reading    Nocturia    Hyperlipidemia LDL goal <100    Elevated blood pressure reading    HTN, goal below 140/90    Bilateral leg edema    Leg edema, right    Enlarged prostate with urinary obstruction    Lymphedema    Abdominal pain    MIAN (acute kidney injury)    Bilateral hydronephrosis    Gross hematuria    Urinary retention    Prostate cancer    Hypervolemia    Moderate protein-calorie malnutrition    Slow transit constipation    Stage IV adenocarcinoma of prostate    Sepsis    CKD (chronic kidney disease) stage 3, GFR 30-59 ml/min    UTI (urinary tract infection) due to Enterococcus    Sepsis due to Enterococcus    Moderate malnutrition       PAST MEDICAL HISTORY  Past Medical History:   Diagnosis Date    MIAN (acute kidney injury) 09/10/2018    BPH (benign prostatic hyperplasia)     Cancer     prostate cancer    Edema 2018       PAST SURGICAL HISTORY:  Past Surgical History:   Procedure Laterality Date    BIOPSY, PROSTATE N/A 2019    [facility]    CYSTOSCOPY, WITH RETROGRADE PYELOGRAM AND BILATERAL URETERAL STENT INSERTION Bilateral 2019    [facility]    CYSTOSCOPY, WITH RETROGRADE PYELOGRAM AND URETERAL STENT EXCHANGE Bilateral 4/15/2019    [facility] at [facility]    HERNIA REPAIR N/A     umbilical    MULTIPLE TOOTH EXTRACTIONS      TONSILLECTOMY Bilateral        SOCIAL HISTORY:  Social History     Tobacco Use    Smoking status: Former Smoker     Packs/day: 0.50     Start date: 1953     Last attempt to quit: 1978     Years since quittin.7    Smokeless tobacco: Former User   Substance Use Topics    Alcohol use: No     Frequency: Never    Drug use: No       FAMILY HISTORY:  Family History   Problem Relation Age of Onset    No Known Problems Mother          93    No Known Problems Father         88: DM    No Known Problems Sister         1 sister     No Known Problems Brother         4 brothers       ALLERGIES AND MEDICATIONS: updated and reviewed.  Review of patient's allergies indicates:  No Known Allergies  Current Outpatient Medications   Medication Sig    acetaminophen-codeine 300-30mg (TYLENOL #3) 300-30 mg Tab TK 1 T PO Q 6 H PRN    bisacodyl (DULCOLAX) 10 mg Supp Place 1 suppository (10 mg total) rectally once daily.    ciprofloxacin HCl (CIPRO) 250 MG tablet TK 1 T PO Q 12 H FOR 10 DAYS    lactulose (CHRONULAC) 10 gram/15 mL solution TK 30 ML PO BID PRN    lidocaine HCl 2% (XYLOCAINE) 2 % JelP urojet by Mucous Membrane route every 30 days. Use with Escalante change    phenazopyridine (PYRIDIUM) 200 MG tablet Take 1 tablet (200 mg total) by mouth 3 (three) times daily as needed for Pain (Burning).    saw palmetto 160 MG capsule Take 160 mg by mouth 2 (two) times daily.    tamsulosin (FLOMAX) 0.4 mg Cap Take 1 capsule (0.4 mg total) by mouth once daily.    bicalutamide (CASODEX) 50 MG Tab Take 1 tablet (50 mg total) by mouth once daily.     No current facility-administered medications for this visit.        Review of Systems   Constitutional: Negative for activity change, fatigue, fever and unexpected weight change.   HENT: Negative for congestion.    Eyes: Negative for redness.   Respiratory: Negative for chest tightness and shortness of breath.    Cardiovascular: Negative for chest pain and leg swelling.   Gastrointestinal: Negative for abdominal pain, constipation, diarrhea, nausea and vomiting.   Genitourinary: Negative for dysuria, flank pain, frequency, hematuria, penile pain, penile swelling, scrotal swelling, testicular pain and urgency.   Musculoskeletal: Negative for arthralgias and back pain.   Neurological: Negative for dizziness and light-headedness.   Psychiatric/Behavioral: Negative for behavioral problems and confusion. The patient  "is not nervous/anxious.    All other systems reviewed and are negative.      Objective:      Vitals:    06/11/19 0913   Resp: 16   Weight: 72.6 kg (160 lb)   Height: 5' 9" (1.753 m)     Physical Exam   Nursing note and vitals reviewed.  Constitutional: He is oriented to person, place, and time. He appears well-developed and well-nourished.   HENT:   Head: Normocephalic.   Eyes: Conjunctivae are normal.   Neck: Normal range of motion. Neck supple. No tracheal deviation present. No thyromegaly present.   Cardiovascular: Normal rate and normal heart sounds.    Pulmonary/Chest: Effort normal and breath sounds normal. No respiratory distress. He has no wheezes.   Abdominal: Soft. Bowel sounds are normal. There is no hepatosplenomegaly. There is no tenderness. There is no rebound and no CVA tenderness. No hernia.   Musculoskeletal: Normal range of motion. He exhibits no edema or tenderness.   Lymphadenopathy:     He has no cervical adenopathy.   Neurological: He is alert and oriented to person, place, and time.   Skin: Skin is warm and dry. No rash noted. No erythema.     Psychiatric: He has a normal mood and affect. His behavior is normal. Judgment and thought content normal.           Assessment:       1. Stage IV adenocarcinoma of prostate    2. Bilateral hydronephrosis    3. Urinary retention    4. Enlarged prostate with urinary obstruction          Plan:       1. Stage IV adenocarcinoma of prostate  Lupron in end of July    2. Bilateral hydronephrosis  Change stents on Friday 7/19/2019  Change Escalante afterwards    3. Urinary retention  Escalante    4. Enlarged prostate with urinary obstruction  As above            No follow-ups on file.  "

## 2019-06-11 NOTE — TELEPHONE ENCOUNTER
Informed Spouse Iman  that [facility] received prescription for Lupron and prior authorization is required.  [facility] will be back in touch once insurance determination is received.

## 2019-07-17 ENCOUNTER — HOSPITAL ENCOUNTER (OUTPATIENT)
Dept: PREADMISSION TESTING | Facility: HOSPITAL | Age: 79
Discharge: HOME | End: 2019-07-17
Attending: UROLOGY
Payer: MEDICARE

## 2019-07-17 VITALS
BODY MASS INDEX: 25.63 KG/M2 | HEART RATE: 77 BPM | OXYGEN SATURATION: 99 % | HEIGHT: 69 IN | DIASTOLIC BLOOD PRESSURE: 75 MMHG | TEMPERATURE: 98 F | SYSTOLIC BLOOD PRESSURE: 149 MMHG | WEIGHT: 173.06 LBS | RESPIRATION RATE: 18 BRPM

## 2019-07-17 DIAGNOSIS — R33.9 URINARY RETENTION: ICD-10-CM

## 2019-07-17 DIAGNOSIS — C61 STAGE IV ADENOCARCINOMA OF PROSTATE: ICD-10-CM

## 2019-07-17 DIAGNOSIS — N13.30 BILATERAL HYDRONEPHROSIS: ICD-10-CM

## 2019-07-17 LAB
ANION GAP SERPL CALC-SCNC: 9 MMOL/L (ref 8–16)
BASOPHILS # BLD AUTO: 0.02 K/UL (ref 0–0.2)
BASOPHILS NFR BLD: 0.2 % (ref 0–1.9)
BUN SERPL-MCNC: 28 MG/DL (ref 8–23)
CALCIUM SERPL-MCNC: 9.9 MG/DL (ref 8.7–10.5)
CHLORIDE SERPL-SCNC: 101 MMOL/L (ref 95–110)
CO2 SERPL-SCNC: 28 MMOL/L (ref 23–29)
CREAT SERPL-MCNC: 1.8 MG/DL (ref 0.5–1.4)
DIFFERENTIAL METHOD: ABNORMAL
EOSINOPHIL # BLD AUTO: 0.3 K/UL (ref 0–0.5)
EOSINOPHIL NFR BLD: 2.8 % (ref 0–8)
ERYTHROCYTE [DISTWIDTH] IN BLOOD BY AUTOMATED COUNT: 14.5 % (ref 11.5–14.5)
EST. GFR  (AFRICAN AMERICAN): 41 ML/MIN/1.73 M^2
EST. GFR  (NON AFRICAN AMERICAN): 35 ML/MIN/1.73 M^2
GLUCOSE SERPL-MCNC: 98 MG/DL (ref 70–110)
HCT VFR BLD AUTO: 35.6 % (ref 40–54)
HGB BLD-MCNC: 11.2 G/DL (ref 14–18)
LYMPHOCYTES # BLD AUTO: 1.6 K/UL (ref 1–4.8)
LYMPHOCYTES NFR BLD: 17.6 % (ref 18–48)
MCH RBC QN AUTO: 29 PG (ref 27–31)
MCHC RBC AUTO-ENTMCNC: 31.5 G/DL (ref 32–36)
MCV RBC AUTO: 92 FL (ref 82–98)
MONOCYTES # BLD AUTO: 0.8 K/UL (ref 0.3–1)
MONOCYTES NFR BLD: 9.1 % (ref 4–15)
NEUTROPHILS # BLD AUTO: 6.2 K/UL (ref 1.8–7.7)
NEUTROPHILS NFR BLD: 71.1 % (ref 38–73)
PLATELET # BLD AUTO: 306 K/UL (ref 150–350)
PMV BLD AUTO: 8.9 FL (ref 9.2–12.9)
POTASSIUM SERPL-SCNC: 4.5 MMOL/L (ref 3.5–5.1)
RBC # BLD AUTO: 3.86 M/UL (ref 4.6–6.2)
SODIUM SERPL-SCNC: 138 MMOL/L (ref 136–145)
WBC # BLD AUTO: 8.8 K/UL (ref 3.9–12.7)

## 2019-07-17 PROCEDURE — 85025 COMPLETE CBC W/AUTO DIFF WBC: CPT

## 2019-07-17 PROCEDURE — 80048 BASIC METABOLIC PNL TOTAL CA: CPT

## 2019-07-17 NOTE — DISCHARGE INSTRUCTIONS
"Your procedure  is scheduled for _Friday July 19, 2019_________.    Call [phone] between 2pm and 5pm on _Thursday July 18, 2019______to find out your arrival time for the day of surgery.    Report to Same Day Surgery Unit at ____ AM on the 2nd floor of the hospital.  Use the front entrance of the hospital.  The front doors of the hospital open promptly at 5:30am.  If you need wheelchair assistance, call [phone] from your cell phone, or call "0" from the courtesy phone in the lobby.    Important instructions:   Do not eat or drink after 12 midnight, including water.  It is okay to brush your teeth.                                                                                                                                                                                                              Do not have gum, candy or mints.    TAKE CIPRO WITH A SIP OF WATER THE AM OF SURGERY    Stop taking Aspirin, Ibuprofen, Motrin and Aleve , Fish oil, and Vitamin E for at least 7 days before your surgery.                                                                                                                                                                                             You may use Tylenol unless otherwise instructed by your doctor.           Prep instructions:    SHOWER   OTHER_____________     Please shower the night before OR   the morning of your surgery.       No shaving of procedural area at least 4-5 days before surgery due to increased risk of skin irritation and/or possible infection.      You may wear deodorant only.      Do not wear powder, body lotion or perfume/cologne.     Do not wear any jewelry or have any metal on your body.     You will be asked to remove any dentures or partials for the procedure.     If you are going home on the same day of surgery, you must arrange for a family member or a friend to drive you home.  Public transportation is prohibited.  You will not " be able to drive home if you were given anesthesia or sedation.     Wear loose fitting clothes allowing for bandages.     Please leave money and valuables home.       You may bring your cell phone.     Call the doctor if fever or illness should occur before your surgery.    Call [phone] to contact us here if needed.

## 2019-07-19 ENCOUNTER — ANESTHESIA EVENT (OUTPATIENT)
Dept: SURGERY | Facility: HOSPITAL | Age: 79
End: 2019-07-19
Payer: MEDICARE

## 2019-07-19 ENCOUNTER — HOSPITAL ENCOUNTER (OUTPATIENT)
Facility: HOSPITAL | Age: 79
Discharge: HOME | End: 2019-07-19
Attending: UROLOGY | Admitting: UROLOGY
Payer: MEDICARE

## 2019-07-19 ENCOUNTER — ANESTHESIA (OUTPATIENT)
Dept: SURGERY | Facility: HOSPITAL | Age: 79
End: 2019-07-19
Payer: MEDICARE

## 2019-07-19 VITALS
TEMPERATURE: 98 F | WEIGHT: 173 LBS | HEART RATE: 72 BPM | RESPIRATION RATE: 17 BRPM | SYSTOLIC BLOOD PRESSURE: 168 MMHG | HEIGHT: 69 IN | DIASTOLIC BLOOD PRESSURE: 72 MMHG | OXYGEN SATURATION: 97 % | BODY MASS INDEX: 25.62 KG/M2

## 2019-07-19 DIAGNOSIS — R33.9 URINARY RETENTION: ICD-10-CM

## 2019-07-19 DIAGNOSIS — N13.30 BILATERAL HYDRONEPHROSIS: ICD-10-CM

## 2019-07-19 DIAGNOSIS — C61 STAGE IV ADENOCARCINOMA OF PROSTATE: Primary | ICD-10-CM

## 2019-07-19 PROCEDURE — 63600175 PHARM REV CODE 636 W HCPCS: Performed by: NURSE ANESTHETIST, CERTIFIED REGISTERED

## 2019-07-19 PROCEDURE — 71000016 HC POSTOP RECOV ADDL HR: Performed by: UROLOGY

## 2019-07-19 PROCEDURE — 36000707: Performed by: UROLOGY

## 2019-07-19 PROCEDURE — 25000003 PHARM REV CODE 250: Performed by: UROLOGY

## 2019-07-19 PROCEDURE — 27200651 HC AIRWAY, LMA: Performed by: NURSE ANESTHETIST, CERTIFIED REGISTERED

## 2019-07-19 PROCEDURE — 37000008 HC ANESTHESIA 1ST 15 MINUTES: Performed by: UROLOGY

## 2019-07-19 PROCEDURE — D9220A PRA ANESTHESIA: Mod: CRNA,,, | Performed by: NURSE ANESTHETIST, CERTIFIED REGISTERED

## 2019-07-19 PROCEDURE — 25000003 PHARM REV CODE 250: Performed by: NURSE ANESTHETIST, CERTIFIED REGISTERED

## 2019-07-19 PROCEDURE — C1769 GUIDE WIRE: HCPCS | Performed by: UROLOGY

## 2019-07-19 PROCEDURE — 76000 PR  FLUOROSCOPE EXAMINATION: ICD-10-PCS | Mod: 26,59,, | Performed by: UROLOGY

## 2019-07-19 PROCEDURE — 36000706: Performed by: UROLOGY

## 2019-07-19 PROCEDURE — D9220A PRA ANESTHESIA: Mod: ANES,,, | Performed by: ANESTHESIOLOGY

## 2019-07-19 PROCEDURE — 88300 SURGICAL PATH GROSS: CPT | Performed by: PATHOLOGY

## 2019-07-19 PROCEDURE — 63600175 PHARM REV CODE 636 W HCPCS: Performed by: UROLOGY

## 2019-07-19 PROCEDURE — 25500020 PHARM REV CODE 255: Performed by: UROLOGY

## 2019-07-19 PROCEDURE — 25000003 PHARM REV CODE 250: Performed by: ANESTHESIOLOGY

## 2019-07-19 PROCEDURE — 52332 PR CYSTOSCOPY,INSERT URETERAL STENT: ICD-10-PCS | Mod: 50,,, | Performed by: UROLOGY

## 2019-07-19 PROCEDURE — 88300 TISSUE SPECIMEN TO PATHOLOGY - SURGERY: ICD-10-PCS | Mod: 26,,, | Performed by: PATHOLOGY

## 2019-07-19 PROCEDURE — D9220A PRA ANESTHESIA: ICD-10-PCS | Mod: CRNA,,, | Performed by: NURSE ANESTHETIST, CERTIFIED REGISTERED

## 2019-07-19 PROCEDURE — 52332 CYSTOSCOPY AND TREATMENT: CPT | Mod: 50,,, | Performed by: UROLOGY

## 2019-07-19 PROCEDURE — 76000 FLUOROSCOPY <1 HR PHYS/QHP: CPT | Mod: 26,59,, | Performed by: UROLOGY

## 2019-07-19 PROCEDURE — C2617 STENT, NON-COR, TEM W/O DEL: HCPCS | Performed by: UROLOGY

## 2019-07-19 PROCEDURE — 71000015 HC POSTOP RECOV 1ST HR: Performed by: UROLOGY

## 2019-07-19 PROCEDURE — D9220A PRA ANESTHESIA: ICD-10-PCS | Mod: ANES,,, | Performed by: ANESTHESIOLOGY

## 2019-07-19 PROCEDURE — 88300 SURGICAL PATH GROSS: CPT | Mod: 26,,, | Performed by: PATHOLOGY

## 2019-07-19 PROCEDURE — 71000033 HC RECOVERY, INTIAL HOUR: Performed by: UROLOGY

## 2019-07-19 PROCEDURE — 37000009 HC ANESTHESIA EA ADD 15 MINS: Performed by: UROLOGY

## 2019-07-19 DEVICE — STENT 6 X 26: Type: IMPLANTABLE DEVICE | Site: URETER | Status: FUNCTIONAL

## 2019-07-19 RX ORDER — GLYCOPYRROLATE 0.2 MG/ML
INJECTION INTRAMUSCULAR; INTRAVENOUS
Status: DISCONTINUED | OUTPATIENT
Start: 2019-07-19 | End: 2019-07-19

## 2019-07-19 RX ORDER — ACETAMINOPHEN 500 MG
500 TABLET ORAL EVERY 6 HOURS PRN
COMMUNITY

## 2019-07-19 RX ORDER — LIDOCAINE HCL/PF 100 MG/5ML
SYRINGE (ML) INTRAVENOUS
Status: DISCONTINUED | OUTPATIENT
Start: 2019-07-19 | End: 2019-07-19

## 2019-07-19 RX ORDER — HYDROCODONE BITARTRATE AND ACETAMINOPHEN 5; 325 MG/1; MG/1
1 TABLET ORAL EVERY 4 HOURS PRN
Status: DISCONTINUED | OUTPATIENT
Start: 2019-07-19 | End: 2019-07-19 | Stop reason: HOSPADM

## 2019-07-19 RX ORDER — SODIUM CHLORIDE, SODIUM LACTATE, POTASSIUM CHLORIDE, CALCIUM CHLORIDE 600; 310; 30; 20 MG/100ML; MG/100ML; MG/100ML; MG/100ML
INJECTION, SOLUTION INTRAVENOUS CONTINUOUS
Status: DISCONTINUED | OUTPATIENT
Start: 2019-07-19 | End: 2019-07-19 | Stop reason: HOSPADM

## 2019-07-19 RX ORDER — HYDROMORPHONE HYDROCHLORIDE 2 MG/ML
0.2 INJECTION, SOLUTION INTRAMUSCULAR; INTRAVENOUS; SUBCUTANEOUS EVERY 5 MIN PRN
Status: DISCONTINUED | OUTPATIENT
Start: 2019-07-19 | End: 2019-07-19 | Stop reason: HOSPADM

## 2019-07-19 RX ORDER — LIDOCAINE HYDROCHLORIDE 10 MG/ML
1 INJECTION, SOLUTION EPIDURAL; INFILTRATION; INTRACAUDAL; PERINEURAL ONCE
Status: DISCONTINUED | OUTPATIENT
Start: 2019-07-19 | End: 2019-07-19 | Stop reason: HOSPADM

## 2019-07-19 RX ORDER — FLUCONAZOLE 100 MG/1
100 TABLET ORAL DAILY
Qty: 5 TABLET | Refills: 0 | Status: SHIPPED | OUTPATIENT
Start: 2019-07-19 | End: 2019-08-18

## 2019-07-19 RX ORDER — FENTANYL CITRATE 50 UG/ML
INJECTION, SOLUTION INTRAMUSCULAR; INTRAVENOUS
Status: DISCONTINUED | OUTPATIENT
Start: 2019-07-19 | End: 2019-07-19

## 2019-07-19 RX ORDER — PROPOFOL 10 MG/ML
VIAL (ML) INTRAVENOUS
Status: DISCONTINUED | OUTPATIENT
Start: 2019-07-19 | End: 2019-07-19

## 2019-07-19 RX ORDER — HYDROCODONE BITARTRATE AND ACETAMINOPHEN 5; 325 MG/1; MG/1
1 TABLET ORAL EVERY 6 HOURS PRN
Qty: 20 TABLET | Refills: 0 | Status: SHIPPED | OUTPATIENT
Start: 2019-07-19

## 2019-07-19 RX ORDER — SODIUM CHLORIDE 0.9 % (FLUSH) 0.9 %
10 SYRINGE (ML) INJECTION
Status: DISCONTINUED | OUTPATIENT
Start: 2019-07-19 | End: 2019-07-19 | Stop reason: HOSPADM

## 2019-07-19 RX ORDER — PHENAZOPYRIDINE HYDROCHLORIDE 100 MG/1
200 TABLET, FILM COATED ORAL ONCE
Status: COMPLETED | OUTPATIENT
Start: 2019-07-19 | End: 2019-07-19

## 2019-07-19 RX ADMIN — SODIUM CHLORIDE, SODIUM LACTATE, POTASSIUM CHLORIDE, AND CALCIUM CHLORIDE: .6; .31; .03; .02 INJECTION, SOLUTION INTRAVENOUS at 08:07

## 2019-07-19 RX ADMIN — GLYCOPYRROLATE 0.2 MG: 0.2 INJECTION, SOLUTION INTRAMUSCULAR; INTRAVENOUS at 08:07

## 2019-07-19 RX ADMIN — FENTANYL CITRATE 25 MCG: 50 INJECTION INTRAMUSCULAR; INTRAVENOUS at 09:07

## 2019-07-19 RX ADMIN — GENTAMICIN SULFATE 107.2 MG: 40 INJECTION, SOLUTION INTRAMUSCULAR; INTRAVENOUS at 09:07

## 2019-07-19 RX ADMIN — PROPOFOL 100 MG: 10 INJECTION, EMULSION INTRAVENOUS at 09:07

## 2019-07-19 RX ADMIN — LIDOCAINE HYDROCHLORIDE 100 MG: 20 INJECTION, SOLUTION INTRAVENOUS at 09:07

## 2019-07-19 RX ADMIN — PHENAZOPYRIDINE HYDROCHLORIDE 200 MG: 100 TABLET, FILM COATED ORAL at 10:07

## 2019-07-19 NOTE — BRIEF OP NOTE
[facility]  Brief Operative Note     SUMMARY     Surgery Date: 7/19/2019     Surgeon(s) and Role:     * ROSINA Bardales MD - Primary    Assisting Surgeon: None    Pre-op Diagnosis:  Stage IV adenocarcinoma of prostate [C61]  Bilateral hydronephrosis [N13.30]  Urinary retention [R33.9]    Post-op Diagnosis:  Post-Op Diagnosis Codes:     * Stage IV adenocarcinoma of prostate [C61]     * Bilateral hydronephrosis [N13.30]     * Urinary retention [R33.9]    Procedure(s) (LRB):  CYSTOSCOPY, WITH URETERAL STENT EXCHANGE (Bilateral)    Anesthesia: General/MAC    Description of the findings of the procedure: stents exchanged, 6x26 JJ, [employer]     Findings/Key Components: as above    Estimated Blood Loss: * No values recorded between 7/19/2019  9:19 AM and 7/19/2019  9:32 AM *         Specimens:   Specimen (12h ago, onward)    None          Discharge Note    SUMMARY     Admit Date: 7/19/2019    Discharge Date and Time:  07/19/2019 9:33 AM    Hospital Course (synopsis of major diagnoses, care, treatment, and services provided during the course of the hospital stay): Patient was admitted for an outpatient procedure and tolerated the procedure well with no complications.      Final Diagnosis: Post-Op Diagnosis Codes:     * Stage IV adenocarcinoma of prostate [C61]     * Bilateral hydronephrosis [N13.30]     * Urinary retention [R33.9]    Disposition: Home or Self Care    Follow Up/Patient Instructions:     Medications:  Reconciled Home Medications:      Medication List      START taking these medications    HYDROcodone-acetaminophen 5-325 mg per tablet  Commonly known as:  NORCO  Take 1 tablet by mouth every 6 (six) hours as needed for Pain.        CONTINUE taking these medications    acetaminophen 500 MG tablet  Commonly known as:  TYLENOL  Take 500 mg by mouth every 6 (six) hours as needed for Pain.     acetaminophen-codeine 300-30mg 300-30 mg Tab  Commonly known as:  TYLENOL #3  TK 1 T PO Q 6 H  PRN     bicalutamide 50 MG Tab  Commonly known as:  CASODEX  Take 1 tablet (50 mg total) by mouth once daily.     bisacodyl 10 mg Supp  Commonly known as:  DULCOLAX  Place 1 suppository (10 mg total) rectally once daily.     ciprofloxacin HCl 250 MG tablet  Commonly known as:  CIPRO  TK 1 T PO Q 12 H FOR 10 DAYS     lactulose 10 gram/15 mL solution  Commonly known as:  CHRONULAC  TK 30 ML PO BID PRN     leuprolide (6 month) 45 mg Sykt injection  Commonly known as:  LUPRON DEPOT (6 MONTH)  Inject 45 mg into the muscle every 6 (six) months.     lidocaine HCl 2% 2 % Jelp urojet  Commonly known as:  XYLOCAINE  by Mucous Membrane route every 30 days. Use with Escalante change     phenazopyridine 200 MG tablet  Commonly known as:  PYRIDIUM  Take 1 tablet (200 mg total) by mouth 3 (three) times daily as needed for Pain (Burning).     saw palmetto 160 MG capsule  Take 160 mg by mouth once daily.     tamsulosin 0.4 mg Cap  Commonly known as:  FLOMAX  Take 1 capsule (0.4 mg total) by mouth once daily.          Discharge Procedure Orders   Diet general     Call MD for:   Order Comments: Significant Hematuria

## 2019-07-19 NOTE — OP NOTE
DATE OF PROCEDURE:  07/19/2019       PREOPERATIVE DIAGNOSIS: Bilateral Hydronephrosis, Prostate Cancer     POSTOPERATIVE DIAGNOSIS: Same     PROCEDURE PERFORMED: Cystoscopy with Bilateral Ureteral stent exchange, Fluoroscopy     PRIMARY SURGEON:  Geovani Bardales M.D.     ANESTHESIA:  General.     ESTIMATED BLOOD LOSS:  Minimal.     DRAINS:  6x26 JJ [employer] Stents, bilaterally, 16 Fr Escalante     COMPLICATIONS:  None.      INDICATIONS:  Obed Sood  is a 78 y.o. male with history prostate cancer causing bilateral hydroneprhosis.  He is here today for Cystoscopy with stent exchange.  His last stent placement was in April 2019.     PROCEDURE:   Obed Sood  was taken to the Operating Room where he was positively   identified by sarah.  He was placed supine on the operating room table.    Following induction of adequate general anesthesia, he was placed in the dorsal   lithotomy position and his external genitalia were prepped and draped in the   usual sterile fashion.     A preoperative timeout was performed as well as confirmation of preoperative   antibiotics.     A  film was taken using fluoroscopy.     A 22-Indonesian rigid cystoscope was then passed per urethra into the bladder under   direct vision.  There were no urethral lesions seen.  The prostate had moderate prostatic hypertrophy.  The trigone has been replaced by prostate tumor and is very abnormal in appearance.   The bladder was inspected and shown to have moderate trabeculation.  Both ureteral orifices were identified.  They were seen to be in orthotopic position and contained previously placed stents.        The right sided stent was grasped and taken down to the urethral meatus without difficulty.  A 0.035 inch [place] wire was then passed though the stent up to the level of the right kidney.  The stent was with drawn.        A 6 Fr x26 cm JJ Stent, [employer] without string was passed over the wire.  A coil was deployed  in the kidney seen on fluoroscopy and a coil was deployed in the bladder seen: on fluoroscopy.     The cystoscope was passed back into the bladder.     The left sided stent was grasped and taken down to the urethral meatus without difficulty.  A 0.035 inch [place] wire was then passed though the stent up to the level of the leftt kidney.  The stent was with drawn.        A 6 Fr x26 cm JJ Stent, [employer] without string was passed over the wire.  A coil was deployed in the kidney seen on fluoroscopy and a coil was deployed in the bladder seen: on fluoroscopy.     The scope was passed once more to visually confirm proper placement of the stent.  The scope was then withdrawn.     A 16 Fr Escalante was placed without difficulty for postoperative drainage.     His anesthesia was reversed.  He was taken to the Recovery Room in stable   condition.

## 2019-07-19 NOTE — DISCHARGE INSTRUCTIONS
ACTIVITY LEVEL: If you have received sedation or an anesthetic, you may feel sleepy for several hours. Rest until you are more awake. Gradually resume your normal activities.       DIET: You may resume your home diet. If nausea is present, increase your diet gradually with fluids and bland foods.      Medications: Pain medication should be taken only if needed and as directed. If antibiotics are prescribed, the medication should be taken until completed. You will be given an updated list of you medications.  ? No driving, alcoholic beverages or signing legal documents for next 24 hours or while taking pain medication        CALL THE DOCTOR:       · Fever over 101°F  · Severe pain that doesnt go away with medication.  · Upset stomach and vomiting that is persistent.  · Problems urinating-unable to urinate or heavy bleeding (with or without clots)     Fall Prevention  Millions of people fall every year and injure themselves. You may have had anesthesia or sedation which may increase your risk of falling. You may have health issues that put you at an increased risk of falling.     Here are ways to reduce your risk of falling.  ·   · Make your home safe by keeping walkways clear of objects you may trip over.  · Use non-slip pads under rugs. Do not use area rugs or small throw rugs.  · Use non-slip mats in bathtubs and showers.  · Install handrails and lights on staircases.  · Do not walk in poorly lit areas.  · Do not stand on chairs or wobbly ladders.  · Use caution when reaching overhead or looking upward. This position can cause a loss of balance.  · Be sure your shoes fit properly, have non-slip bottoms and are in good condition.   · Wear shoes both inside and out. Avoid going barefoot or wearing slippers.  · Be cautious when going up and down stairs, curbs, and when walking on uneven sidewalks.  · If your balance is poor, consider using a cane or walker.  · If your fall was related to alcohol use, stop or limit  alcohol intake.   · If your fall was related to use of sleeping medicines, talk to your doctor about this. You may need to reduce your dosage at bedtime if you awaken during the night to go to the bathroom.    · To reduce the need for nighttime bathroom trips:  ¨ Avoid drinking fluids for several hours before going to bed  ¨ Empty your bladder before going to bed  ¨ Men can keep a urinal at the bedside  · Stay as active as you can. Balance, flexibility, strength, and endurance all come from exercise. They all play a role in preventing falls. Ask your healthcare provider which types of activity are right for you.  · Get your vision checked on a regular basis.  · If you have pets, know where they are before you stand up or walk so you don't trip over them.  Use night lights.

## 2019-07-19 NOTE — ANESTHESIA PREPROCEDURE EVALUATION
07/19/2019  Obed Sood is a 78 y.o., male.    Pre-op Assessment    I have reviewed the Patient Summary Reports.     I have reviewed the Nursing Notes.      Review of Systems  Anesthesia Hx:  No problems with previous Anesthesia  Denies Family Hx of Anesthesia complications.   Denies Personal Hx of Anesthesia complications.   Social:  Former Smoker    Cardiovascular:   Exercise tolerance: poor Denies Pacemaker. Hypertension  Denies Valvular problems/Murmurs.  Denies MI.  Denies CAD.    Denies CABG/stent.  Denies Dysrhythmias.      hyperlipidemia Echo 11/2018:  WNL  Lower extremity edema, at baseline    Pt doesn't have much exercise tolerance secondary to leg swelling   Pulmonary:  Pulmonary Normal    Renal/:   Chronic Renal Disease BPH Hydronephrosis; arias   Hepatic/GI:  Hepatic/GI Normal    Neurological:  Neurology Normal    Endocrine:  Endocrine Normal        Physical Exam  General:  Well nourished    Airway/Jaw/Neck:  Airway Findings: Tongue: Normal Mallampati: III  TM Distance: 4 - 6 cm      Dental:  Dental Findings: Edentulous   Chest/Lungs:  Chest/Lungs Findings: Clear to auscultation, Normal Respiratory Rate     Heart/Vascular:  Heart Findings: Rate: Normal  Rhythm: Regular Rhythm        Mental Status:  Mental Status Findings:  Cooperative, Alert and Oriented       Wt Readings from Last 3 Encounters:   07/19/19 78.5 kg (173 lb)   07/17/19 78.5 kg (173 lb 1 oz)   06/11/19 72.6 kg (160 lb)     Temp Readings from Last 3 Encounters:   07/19/19 36.6 °C (97.8 °F) (Oral)   07/17/19 36.7 °C (98 °F) (Oral)   04/15/19 36.6 °C (97.9 °F) (Oral)     BP Readings from Last 3 Encounters:   07/19/19 (!) 160/71   07/17/19 (!) 149/75   04/15/19 (!) 148/69     Pulse Readings from Last 3 Encounters:   07/19/19 76   07/17/19 77   04/15/19 78     Lab Results   Component Value Date    WBC 8.80 07/17/2019    HGB 11.2  (L) 07/17/2019    HCT 35.6 (L) 07/17/2019    MCV 92 07/17/2019     07/17/2019     CMP  Sodium   Date Value Ref Range Status   07/17/2019 138 136 - 145 mmol/L Final     Potassium   Date Value Ref Range Status   07/17/2019 4.5 3.5 - 5.1 mmol/L Final     Chloride   Date Value Ref Range Status   07/17/2019 101 95 - 110 mmol/L Final     CO2   Date Value Ref Range Status   07/17/2019 28 23 - 29 mmol/L Final     Glucose   Date Value Ref Range Status   07/17/2019 98 70 - 110 mg/dL Final     BUN, Bld   Date Value Ref Range Status   07/17/2019 28 (H) 8 - 23 mg/dL Final     Creatinine   Date Value Ref Range Status   07/17/2019 1.8 (H) 0.5 - 1.4 mg/dL Final     Calcium   Date Value Ref Range Status   07/17/2019 9.9 8.7 - 10.5 mg/dL Final     Total Protein   Date Value Ref Range Status   03/17/2019 5.4 (L) 6.0 - 8.4 g/dL Final     Albumin   Date Value Ref Range Status   03/17/2019 2.2 (L) 3.5 - 5.2 g/dL Final     Total Bilirubin   Date Value Ref Range Status   03/17/2019 0.6 0.1 - 1.0 mg/dL Final     Comment:     For infants and newborns, interpretation of results should be based  on gestational age, weight and in agreement with clinical  observations.  Premature Infant recommended reference ranges:  Up to 24 hours.............<8.0 mg/dL  Up to 48 hours............<12.0 mg/dL  3-5 days..................<15.0 mg/dL  6-29 days.................<15.0 mg/dL       Alkaline Phosphatase   Date Value Ref Range Status   03/17/2019 237 (H) 55 - 135 U/L Final     AST   Date Value Ref Range Status   03/17/2019 81 (H) 10 - 40 U/L Final     ALT   Date Value Ref Range Status   03/17/2019 88 (H) 10 - 44 U/L Final     Anion Gap   Date Value Ref Range Status   07/17/2019 9 8 - 16 mmol/L Final     eGFR if    Date Value Ref Range Status   07/17/2019 41 (A) >60 mL/min/1.73 m^2 Final     eGFR if non    Date Value Ref Range Status   07/17/2019 35 (A) >60 mL/min/1.73 m^2 Final     Comment:     Calculation used to  obtain the estimated glomerular filtration  rate (eGFR) is the CKD-EPI equation.            Anesthesia Plan  Type of Anesthesia, risks & benefits discussed:  Anesthesia Type:  general  Patient's Preference:   Intra-op Monitoring Plan: standard ASA monitors  Intra-op Monitoring Plan Comments:   Post Op Pain Control Plan: multimodal analgesia and per primary service following discharge from PACU  Post Op Pain Control Plan Comments:   Induction:   IV  Beta Blocker:  Patient is not currently on a Beta-Blocker (No further documentation required).       Informed Consent: Patient understands risks and agrees with Anesthesia plan.  Questions answered. Anesthesia consent signed with patient.  ASA Score: 3     Day of Surgery Review of History & Physical: I have interviewed and examined the patient. I have reviewed the patient's H&P dated:    H&P update referred to the surgeon.         Ready For Surgery From Anesthesia Perspective.

## 2019-07-19 NOTE — TRANSFER OF CARE
"Anesthesia Transfer of Care Note    Patient: Obed Sood    Procedure(s) Performed: Procedure(s) (LRB):  CYSTOSCOPY, WITH URETERAL STENT EXCHANGE (Bilateral)    Patient location: PACU    Anesthesia Type: general    Transport from OR: Transported from OR on room air with adequate spontaneous ventilation    Post pain: adequate analgesia    Post assessment: no apparent anesthetic complications    Post vital signs: stable    Level of consciousness: awake, alert and oriented    Nausea/Vomiting: no nausea/vomiting    Complications: none    Transfer of care protocol was followed      Last vitals:   Visit Vitals  BP (!) 173/74   Pulse 70   Temp 36.8 °C (98.2 °F)   Resp 16   Ht 5' 9" (1.753 m)   Wt 78.5 kg (173 lb)   SpO2 95%   BMI 25.55 kg/m²     "

## 2019-07-19 NOTE — DISCHARGE SUMMARY
[facility]  Discharge Note  Short Stay    Admit Date: 7/19/2019    Discharge Date and Time: 07/19/2019 9:33 AM      Attending Physician: ROSINA Baradles MD     Discharge Provider: HECTOR Bardales    Diagnoses:  Active Hospital Problems    Diagnosis  POA    *Stage IV adenocarcinoma of prostate [C61]  Yes      Resolved Hospital Problems   No resolved problems to display.       Discharged Condition: stable    Hospital Course: Patient was admitted for an outpatient procedure and tolerated the procedure well with no complications.    Final Diagnoses: Same as principal problem.    Disposition: Home or Self Care    Follow up/Patient Instructions:    Medications:  Reconciled Home Medications:      Medication List      START taking these medications    HYDROcodone-acetaminophen 5-325 mg per tablet  Commonly known as:  NORCO  Take 1 tablet by mouth every 6 (six) hours as needed for Pain.        CONTINUE taking these medications    acetaminophen 500 MG tablet  Commonly known as:  TYLENOL  Take 500 mg by mouth every 6 (six) hours as needed for Pain.     acetaminophen-codeine 300-30mg 300-30 mg Tab  Commonly known as:  TYLENOL #3  TK 1 T PO Q 6 H PRN     bicalutamide 50 MG Tab  Commonly known as:  CASODEX  Take 1 tablet (50 mg total) by mouth once daily.     bisacodyl 10 mg Supp  Commonly known as:  DULCOLAX  Place 1 suppository (10 mg total) rectally once daily.     ciprofloxacin HCl 250 MG tablet  Commonly known as:  CIPRO  TK 1 T PO Q 12 H FOR 10 DAYS     lactulose 10 gram/15 mL solution  Commonly known as:  CHRONULAC  TK 30 ML PO BID PRN     leuprolide (6 month) 45 mg Sykt injection  Commonly known as:  LUPRON DEPOT (6 MONTH)  Inject 45 mg into the muscle every 6 (six) months.     lidocaine HCl 2% 2 % Jelp urojet  Commonly known as:  XYLOCAINE  by Mucous Membrane route every 30 days. Use with Escalante change     phenazopyridine 200 MG tablet  Commonly known as:  PYRIDIUM  Take 1 tablet (200 mg total) by mouth 3  (three) times daily as needed for Pain (Burning).     saw palmetto 160 MG capsule  Take 160 mg by mouth once daily.     tamsulosin 0.4 mg Cap  Commonly known as:  FLOMAX  Take 1 capsule (0.4 mg total) by mouth once daily.          Discharge Procedure Orders   Diet general     Call MD for:   Order Comments: Significant Hematuria         Discharge Procedure Orders (must include Diet, Follow-up, Activity):   Discharge Procedure Orders (must include Diet, Follow-up, Activity)   Diet general     Call MD for:   Order Comments: Significant Hematuria

## 2019-07-19 NOTE — H&P
Subjective:       Patient ID: Obed Sood is a 78 y.o. male who was referred by No ref. provider found    Chief Complaint:   Chief Complaint   Patient presents with    Follow-up     patient is here for follow up       Metastatic Prostate Cancer  Patient was admitted with ARF and LE edema in January 2019. He was seen as an inpatient consult by Dr. Spivey on 1/10/19 for acute kidney injury with hydronephrosis. He was also found to have an elevated PSA of 97.2.  He has no known prostate cancer issues though does report in 1990s he was recommended for PBx but declined at that time. He notes mild urinary issues. Denies hematuria.      PVR 1/10/19-- 400cc. Arias catheter placed in ER with return of 400cc yellow urine.     CT scan -- nodular, enlarged prostate with extensive LAD. Mild bilateral hydroureteronephrosis with some tortuosity of ureters. Thickened bladder with moderate distention.   Hydronephrosis remained persistent despite Arias catheter. Therefore, he underwent cystoscopy/bilateral RPG/bilateral stent placement on 1/14/19 by Dr. Bardales.    He also underwent prostate biopsy on 1/14/19. His pathology showed: prostate cancer. He was started on Casodex during recent admission. He received Trelstar 11.25mg injection on 1/28/19. Denies bone pain or loss of appetite    He had a voiding trial in this office on 2/12/19 which was unsuccessful. Arias replaced. He is here today for a repeat voiding trial. He is tolerating his arias and stents. Denies gross hematuria or flank pain. He reports improvement in fabien LE edema. He is able to ambulate better without walker. No new issues    He is s/p bilateral ureteral stent exchange on 4/15/2019.  He had an infection afterwards.  He is now planning to take some antibiotics from Dr. Barker before the next change.  He will be due for a Trelstar/Lupron in the end of July.  He is also due for a stent exchange around the same time. Home health is changing his  Boris.    ACTIVE MEDICAL ISSUES:  Patient Active Problem List   Diagnosis    Elevated blood pressure reading    Nocturia    Hyperlipidemia LDL goal <100    Elevated blood pressure reading    HTN, goal below 140/90    Bilateral leg edema    Leg edema, right    Enlarged prostate with urinary obstruction    Lymphedema    Abdominal pain    MIAN (acute kidney injury)    Bilateral hydronephrosis    Gross hematuria    Urinary retention    Prostate cancer    Hypervolemia    Moderate protein-calorie malnutrition    Slow transit constipation    Stage IV adenocarcinoma of prostate    Sepsis    CKD (chronic kidney disease) stage 3, GFR 30-59 ml/min    UTI (urinary tract infection) due to Enterococcus    Sepsis due to Enterococcus    Moderate malnutrition       PAST MEDICAL HISTORY  Past Medical History:   Diagnosis Date    MIAN (acute kidney injury) 09/10/2018    BPH (benign prostatic hyperplasia)     Cancer     prostate cancer    Edema 2018       PAST SURGICAL HISTORY:  Past Surgical History:   Procedure Laterality Date    BIOPSY, PROSTATE N/A 2019    Per[facility]    CYSTOSCOPY, WITH RETROGRADE PYELOGRAM AND BILATERAL URETERAL STENT INSERTION Bilateral 2019    Per[facility] at [facility] OR    CYSTOSCOPY, WITH RETROGRADE PYELOGRAM AND URETERAL STENT EXCHANGE Bilateral 4/15/2019    Per[facility] by [facility] at [facility] OR    HERNIA REPAIR N/A     umbilical    MULTIPLE TOOTH EXTRACTIONS      TONSILLECTOMY Bilateral        SOCIAL HISTORY:  Social History     Tobacco Use    Smoking status: Former Smoker     Packs/day: 0.50     Start date: 1953     Last attempt to quit: 1978     Years since quittin.7    Smokeless tobacco: Former User   Substance Use Topics    Alcohol use: No     Frequency: Never    Drug use: No       FAMILY HISTORY:  Family History   Problem Relation Age of Onset    No Known Problems Mother          89    No Known Problems Father         88: DM    No Known Problems Sister         1 sister     No Known Problems Brother         4 brothers       ALLERGIES AND MEDICATIONS: updated and reviewed.  Review of patient's allergies indicates:  No Known Allergies  Current Outpatient Medications   Medication Sig    acetaminophen-codeine 300-30mg (TYLENOL #3) 300-30 mg Tab TK 1 T PO Q 6 H PRN    bisacodyl (DULCOLAX) 10 mg Supp Place 1 suppository (10 mg total) rectally once daily.    ciprofloxacin HCl (CIPRO) 250 MG tablet TK 1 T PO Q 12 H FOR 10 DAYS    lactulose (CHRONULAC) 10 gram/15 mL solution TK 30 ML PO BID PRN    lidocaine HCl 2% (XYLOCAINE) 2 % JelP urojet by Mucous Membrane route every 30 days. Use with Escalante change    phenazopyridine (PYRIDIUM) 200 MG tablet Take 1 tablet (200 mg total) by mouth 3 (three) times daily as needed for Pain (Burning).    saw palmetto 160 MG capsule Take 160 mg by mouth 2 (two) times daily.    tamsulosin (FLOMAX) 0.4 mg Cap Take 1 capsule (0.4 mg total) by mouth once daily.    bicalutamide (CASODEX) 50 MG Tab Take 1 tablet (50 mg total) by mouth once daily.     No current facility-administered medications for this visit.        Review of Systems   Constitutional: Negative for activity change, fatigue, fever and unexpected weight change.   HENT: Negative for congestion.    Eyes: Negative for redness.   Respiratory: Negative for chest tightness and shortness of breath.    Cardiovascular: Negative for chest pain and leg swelling.   Gastrointestinal: Negative for abdominal pain, constipation, diarrhea, nausea and vomiting.   Genitourinary: Negative for dysuria, flank pain, frequency, hematuria, penile pain, penile swelling, scrotal swelling, testicular pain and urgency.   Musculoskeletal: Negative for arthralgias and back pain.   Neurological: Negative for dizziness and light-headedness.   Psychiatric/Behavioral: Negative for behavioral problems and confusion. The patient  "is not nervous/anxious.    All other systems reviewed and are negative.      Objective:      Vitals:    06/11/19 0913   Resp: 16   Weight: 72.6 kg (160 lb)   Height: 5' 9" (1.753 m)     Physical Exam   Nursing note and vitals reviewed.  Constitutional: He is oriented to person, place, and time. He appears well-developed and well-nourished.   HENT:   Head: Normocephalic.   Eyes: Conjunctivae are normal.   Neck: Normal range of motion. Neck supple. No tracheal deviation present. No thyromegaly present.   Cardiovascular: Normal rate and normal heart sounds.    Pulmonary/Chest: Effort normal and breath sounds normal. No respiratory distress. He has no wheezes.   Abdominal: Soft. Bowel sounds are normal. There is no hepatosplenomegaly. There is no tenderness. There is no rebound and no CVA tenderness. No hernia.   Musculoskeletal: Normal range of motion. He exhibits no edema or tenderness.   Lymphadenopathy:     He has no cervical adenopathy.   Neurological: He is alert and oriented to person, place, and time.   Skin: Skin is warm and dry. No rash noted. No erythema.     Psychiatric: He has a normal mood and affect. His behavior is normal. Judgment and thought content normal.           Assessment:       1. Stage IV adenocarcinoma of prostate    2. Bilateral hydronephrosis    3. Urinary retention    4. Enlarged prostate with urinary obstruction          Plan:       1. Stage IV adenocarcinoma of prostate  Lupron in end of July    2. Bilateral hydronephrosis  Change stents on Friday 7/19/2019  Change Escalante afterwards    3. Urinary retention  Escalante    4. Enlarged prostate with urinary obstruction  As above            No follow-ups on file.    "

## 2019-07-19 NOTE — ANESTHESIA POSTPROCEDURE EVALUATION
Anesthesia Post Evaluation    Patient: Obed Sood    Procedure(s) Performed: Procedure(s) (LRB):  CYSTOSCOPY, WITH URETERAL STENT EXCHANGE (Bilateral)    Final Anesthesia Type: general  Patient location during evaluation: PACU  Patient participation: Yes- Able to Participate  Level of consciousness: awake and alert, oriented and awake  Post-procedure vital signs: reviewed and stable  Airway patency: patent  PONV status at discharge: No PONV  Anesthetic complications: no      Cardiovascular status: blood pressure returned to baseline  Respiratory status: unassisted, spontaneous ventilation and room air  Hydration status: euvolemic  Follow-up not needed.          Vitals Value Taken Time   /69 7/19/2019 10:03 AM   Temp 36.8 °C (98.2 °F) 7/19/2019  9:45 AM   Pulse 68 7/19/2019 10:04 AM   Resp 20 7/19/2019 10:04 AM   SpO2 97 % 7/19/2019 10:04 AM   Vitals shown include unvalidated device data.      No case tracking events are documented in the log.      Pain/Andrew Score: No data recorded

## 2019-07-21 NOTE — ANESTHESIA POSTPROCEDURE EVALUATION
Anesthesia Post Evaluation    Patient: Obed Sood    Procedure(s) Performed: Procedure(s) (LRB):  CYSTOSCOPY, WITH URETERAL STENT EXCHANGE (Bilateral)    Final Anesthesia Type: general  Patient location during evaluation: PACU  Patient participation: Yes- Able to Participate  Level of consciousness: awake and alert, oriented and awake  Post-procedure vital signs: reviewed and stable  Airway patency: patent  PONV status at discharge: No PONV  Anesthetic complications: no      Cardiovascular status: blood pressure returned to baseline  Respiratory status: unassisted, spontaneous ventilation and room air  Hydration status: euvolemic  Follow-up not needed.          Vitals Value Taken Time   /72 7/19/2019 12:10 PM   Temp 36.8 °C (98.3 °F) 7/19/2019 12:10 PM   Pulse 72 7/19/2019 12:10 PM   Resp 17 7/19/2019 12:10 PM   SpO2 97 % 7/19/2019 12:10 PM         Event Time     Out of Recovery 10:36:00          Pain/Andrew Score: No data recorded

## 2019-07-29 ENCOUNTER — OFFICE VISIT (OUTPATIENT)
Dept: UROLOGY | Facility: CLINIC | Age: 79
End: 2019-07-29
Payer: MEDICARE

## 2019-07-29 VITALS
HEIGHT: 69 IN | BODY MASS INDEX: 24.67 KG/M2 | SYSTOLIC BLOOD PRESSURE: 126 MMHG | WEIGHT: 166.56 LBS | DIASTOLIC BLOOD PRESSURE: 80 MMHG

## 2019-07-29 DIAGNOSIS — R33.9 URINARY RETENTION: ICD-10-CM

## 2019-07-29 DIAGNOSIS — R31.0 GROSS HEMATURIA: ICD-10-CM

## 2019-07-29 DIAGNOSIS — C61 STAGE IV ADENOCARCINOMA OF PROSTATE: ICD-10-CM

## 2019-07-29 DIAGNOSIS — N13.30 BILATERAL HYDRONEPHROSIS: Primary | ICD-10-CM

## 2019-07-29 PROCEDURE — 99214 OFFICE O/P EST MOD 30 MIN: CPT | Mod: S$PBB,,, | Performed by: UROLOGY

## 2019-07-29 PROCEDURE — 87086 URINE CULTURE/COLONY COUNT: CPT

## 2019-07-29 PROCEDURE — 99999 PR PBB SHADOW E&M-EST. PATIENT-LVL III: ICD-10-PCS | Mod: PBBFAC,,, | Performed by: UROLOGY

## 2019-07-29 PROCEDURE — 99999 PR PBB SHADOW E&M-EST. PATIENT-LVL III: CPT | Mod: PBBFAC,,, | Performed by: UROLOGY

## 2019-07-29 PROCEDURE — 99214 PR OFFICE/OUTPT VISIT, EST, LEVL IV, 30-39 MIN: ICD-10-PCS | Mod: S$PBB,,, | Performed by: UROLOGY

## 2019-07-29 PROCEDURE — 81001 URINALYSIS AUTO W/SCOPE: CPT | Mod: PBBFAC | Performed by: UROLOGY

## 2019-07-29 PROCEDURE — 96402 CHEMO HORMON ANTINEOPL SQ/IM: CPT | Mod: PBBFAC

## 2019-07-29 PROCEDURE — 99213 OFFICE O/P EST LOW 20 MIN: CPT | Mod: PBBFAC | Performed by: UROLOGY

## 2019-07-29 RX ADMIN — LEUPROLIDE ACETATE 45 MG: KIT at 04:07

## 2019-07-29 NOTE — PROGRESS NOTES
Subjective:       Patient ID: Obed Sood is a 79 y.o. male who was last seen in this office 6/11/2019    Chief Complaint:   Chief Complaint   Patient presents with    Other     follow up from procedure 7/19/19 and injection       Metastatic Prostate Cancer  Patient was admitted with ARF and LE edema in January 2019. He was seen as an inpatient consult by Dr. Spivey on 1/10/19 for acute kidney injury with hydronephrosis. He was also found to have an elevated PSA of 97.2.  He has no known prostate cancer issues though does report in 1990s he was recommended for PBx but declined at that time. He notes mild urinary issues. Denies hematuria.      PVR 1/10/19-- 400cc. Arias catheter placed in ER with return of 400cc yellow urine.     CT scan -- nodular, enlarged prostate with extensive LAD. Mild bilateral hydroureteronephrosis with some tortuosity of ureters. Thickened bladder with moderate distention.   Hydronephrosis remained persistent despite Arias catheter. Therefore, he underwent cystoscopy/bilateral RPG/bilateral stent placement on 1/14/19 by Dr. Bardales.    He also underwent prostate biopsy on 1/14/19. His pathology showed: prostate cancer. He was started on Casodex during recent admission. He received Trelstar 11.25mg injection on 4/29/2019. Denies bone pain or loss of appetite    He had a voiding trial in this office on 2/12/19 which was unsuccessful. Arias replaced. He is here today for a repeat voiding trial. He is tolerating his arias and stents. Denies gross hematuria or flank pain. He reports improvement in fabien LE edema. He is able to ambulate better without walker. No new issues    He is s/p bilateral ureteral stent exchange on 7/19/2019.  He has had some pain in his right leg afterwards.  He is taking Tylenol.     He will be due for a Trelstar/Lupron today.      He is also due for a stent exchange around in December.    Home health is changing his Arias.  ACTIVE MEDICAL ISSUES:  Patient Active  Problem List   Diagnosis    Elevated blood pressure reading    Nocturia    Hyperlipidemia LDL goal <100    Elevated blood pressure reading    HTN, goal below 140/90    Bilateral leg edema    Leg edema, right    Enlarged prostate with urinary obstruction    Lymphedema    Abdominal pain    MIAN (acute kidney injury)    Bilateral hydronephrosis    Gross hematuria    Urinary retention    Prostate cancer    Hypervolemia    Moderate protein-calorie malnutrition    Slow transit constipation    Stage IV adenocarcinoma of prostate    Sepsis    CKD (chronic kidney disease) stage 3, GFR 30-59 ml/min    UTI (urinary tract infection) due to Enterococcus    Sepsis due to Enterococcus    Moderate malnutrition       ALLERGIES AND MEDICATIONS: updated and reviewed.  Review of patient's allergies indicates:  No Known Allergies  Current Outpatient Medications   Medication Sig    acetaminophen (TYLENOL) 500 MG tablet Take 500 mg by mouth every 6 (six) hours as needed for Pain.    acetaminophen-codeine 300-30mg (TYLENOL #3) 300-30 mg Tab TK 1 T PO Q 6 H PRN    bisacodyl (DULCOLAX) 10 mg Supp Place 1 suppository (10 mg total) rectally once daily.    ciprofloxacin HCl (CIPRO) 250 MG tablet TK 1 T PO Q 12 H FOR 10 DAYS    fluconazole (DIFLUCAN) 100 MG tablet Take 1 tablet (100 mg total) by mouth once daily.    HYDROcodone-acetaminophen (NORCO) 5-325 mg per tablet Take 1 tablet by mouth every 6 (six) hours as needed for Pain.    lactulose (CHRONULAC) 10 gram/15 mL solution TK 30 ML PO BID PRN    leuprolide, 6 month, (LUPRON DEPOT, 6 MONTH,) 45 mg SyKt injection Inject 45 mg into the muscle every 6 (six) months.    lidocaine HCl 2% (XYLOCAINE) 2 % JelP urojet by Mucous Membrane route every 30 days. Use with Escalante change    phenazopyridine (PYRIDIUM) 200 MG tablet Take 1 tablet (200 mg total) by mouth 3 (three) times daily as needed for Pain (Burning).    saw palmetto 160 MG capsule Take 160 mg by mouth once  "daily.     tamsulosin (FLOMAX) 0.4 mg Cap Take 1 capsule (0.4 mg total) by mouth once daily.    bicalutamide (CASODEX) 50 MG Tab Take 1 tablet (50 mg total) by mouth once daily.     No current facility-administered medications for this visit.        Review of Systems   Constitutional: Negative for activity change, fatigue, fever and unexpected weight change.   HENT: Negative for congestion.    Eyes: Negative for redness.   Respiratory: Negative for chest tightness and shortness of breath.    Cardiovascular: Negative for chest pain and leg swelling.   Gastrointestinal: Negative for abdominal pain, constipation, diarrhea, nausea and vomiting.   Genitourinary: Negative for dysuria, flank pain, frequency, hematuria, penile pain, penile swelling, scrotal swelling, testicular pain and urgency.   Musculoskeletal: Negative for arthralgias and back pain.   Neurological: Negative for dizziness and light-headedness.   Psychiatric/Behavioral: Negative for behavioral problems and confusion. The patient is not nervous/anxious.    All other systems reviewed and are negative.      Objective:      Vitals:    07/29/19 1430   BP: 126/80   BP Location: Right arm   Patient Position: Sitting   Weight: 75.5 kg (166 lb 8.9 oz)   Height: 5' 9" (1.753 m)     Physical Exam   Nursing note and vitals reviewed.  Constitutional: He is oriented to person, place, and time. He appears well-developed and well-nourished.   HENT:   Head: Normocephalic.   Eyes: Conjunctivae are normal.   Neck: Normal range of motion. Neck supple. No tracheal deviation present. No thyromegaly present.   Cardiovascular: Normal rate and normal heart sounds.    Pulmonary/Chest: Effort normal and breath sounds normal. No respiratory distress. He has no wheezes.   Abdominal: Soft. Bowel sounds are normal. There is no hepatosplenomegaly. There is no tenderness. There is no rebound and no CVA tenderness. No hernia.   Musculoskeletal: Normal range of motion. He exhibits no " edema or tenderness.   Lymphadenopathy:     He has no cervical adenopathy.   Neurological: He is alert and oriented to person, place, and time.   Skin: Skin is warm and dry. No rash noted. No erythema.     Psychiatric: He has a normal mood and affect. His behavior is normal. Judgment and thought content normal.       Urine dipstick shows negative for all components.  Micro exam: negative for WBC's or RBC's.    Assessment:       1. Bilateral hydronephrosis    2. Gross hematuria    3. Urinary retention    4. Stage IV adenocarcinoma of prostate          Plan:       1. Bilateral hydronephrosis  Follow up in in a few months to set up a stent exchange in December    2. Gross hematuria  Urine culture    3. Urinary retention  Escalante per     4. Stage IV adenocarcinoma of prostate  Lupron today            No follow-ups on file.

## 2019-07-29 NOTE — PROGRESS NOTES
Name, , and allergies verified. Patient verbalized understanding of why they are here today. Patient here for lupron  Injection. Lupron 45 mg mg given IM with no adverse effects. (See MAR)  Patient tolerated procedure well.  Patient was asked to wait an appropriate 15 minutes in ensure that no reaction occurred. Patient was discharged with no acute distress and was instructed to call the office if any questions or concerns arose.

## 2019-07-31 LAB — BACTERIA UR CULT: NORMAL

## 2019-11-05 ENCOUNTER — TELEPHONE (OUTPATIENT)
Dept: UROLOGY | Facility: CLINIC | Age: 79
End: 2019-11-05

## 2019-11-05 NOTE — TELEPHONE ENCOUNTER
----- Message from ROSINA Bardales MD sent at 11/5/2019 12:13 PM CST -----  Contact: WifeYvon Israel  Either will work.  He will still need to come in for an appointment for urine studies and consents.      ----- Message -----  From: Dano Reyes MA  Sent: 11/5/2019  11:25 AM CST  To: ROSINA Bardales MD     The patients wife would like the up- coming surgery for her to be on Dec. 27th  Or the 30th .. ThIf possible , they have an appt. In November but would like to lock in dates if possible.. Please advice .. B.N.  ----- Message -----  From: Venus Sheriff  Sent: 11/5/2019  10:08 AM CST  To: Catia Olivo Staff    Type: Patient Call Back    Who called: Nitesh Israel    What is the request in detail:Nitesh Israel is requesting a call back to discuss questions about the procedure.     Can the clinic reply by [facility]?    Would the patient rather a call back or a response via [facility]? Call back     Best call back number: [phone]

## 2019-11-05 NOTE — TELEPHONE ENCOUNTER
----- Message from Venus Sheriff sent at 11/5/2019 10:08 AM CST -----  Contact: WifeYvon Israel  Type: Patient Call Back    Who called: WifeYvon Israel    What is the request in detail:WifeYvon Israel is requesting a call back to discuss questions about the procedure.     Can the clinic reply by [facility]?    Would the patient rather a call back or a response via [facility]? Call back     Best call back number: [phone]

## 2019-11-05 NOTE — TELEPHONE ENCOUNTER
Spoke to the patients wife who would like for her husbands up- coming surgery to be scheduled  December 27th or the 30th if possible .. They do have a appt. Coming in November but she wanted to lock in the date now.. JAELYN

## 2019-11-05 NOTE — TELEPHONE ENCOUNTER
----- Message from Venus Sheriff sent at 11/5/2019 10:08 AM CST -----  Contact: WifeYvon Israel  Type: Patient Call Back    Who called: WifeYvon Israel    What is the request in detail:WifeYvon Israel is requesting a call back to discuss questions about the procedure.     Can the clinic reply by [facility]?    Would the patient rather a call back or a response via [facility]? Call back     Best call back number: [phone]

## 2020-08-28 NOTE — H&P
[facility]  Hematology/Oncology  H&P    Patient Name: Obed Sood  MRN: [id number]  Admission Date: 1/10/2019  Code Status: Full Code   Attending Provider: Enrique Barker MD  Primary Care Physician: Enrique Barker MD  Principal Problem:<principal problem not specified>    Subjective:     HPI:     Mr. Sood is a pleasant 77 YO gentleman with BPH who began to have fabien leg edema in Nov 2018. Pt was treated conservatively and underwent out pt work up. Pt began to have RLE non pitting edema and labs revealed MIAN. Pt was given IV fluid out pt and further labs obtained. PSA was found to be elevated. CT ab pelvis today revealed moderate bilateral hydronephrosis, conglomerated retroperitoneal adenopathy present surrounding the abdominal aorta and lower inferior vena cava with loss of the normal fat, Iliac chain adenopathy with bilobed soft tissue density nodule to the right of the urinary bladder anteriorly measuring 2.8 x 1.5 cm in size as well as enlarged prostate gland with a nodular projection posteriorly on the left.     Few weeks ago, he began to have lower back pain and started taking naprosyn OTC. No recent wt changes. Had difficulty ambulating due to leg edema, R> L.     Oncology Treatment Plan:   [No treatment plan]    Medications:  Continuous Infusions:   sodium chloride 0.9% 1,000 mL (01/10/19 1412)     Scheduled Meds:   heparin (porcine)  5,000 Units Subcutaneous Q8H    [START ON 1/11/2019] tamsulosin  0.4 mg Oral Daily     PRN Meds:acetaminophen, ondansetron, sodium chloride 0.9%     Review of patient's allergies indicates:  No Known Allergies     Past Medical History:   Diagnosis Date    MIAN (acute kidney injury) 09/10/2018    BPH (benign prostatic hyperplasia)     Edema 11/02/2018     Past Surgical History:   Procedure Laterality Date    HERNIA REPAIR N/A 1999    umbilical    MULTIPLE TOOTH EXTRACTIONS      TONSILLECTOMY Bilateral 1969     Family History     Problem  Relation (Age of Onset)    No Known Problems Mother, Father, Sister, Brother        Tobacco Use    Smoking status: Former Smoker     Packs/day: 0.50     Start date: 1953     Last attempt to quit: 1978     Years since quittin.3    Smokeless tobacco: Former User   Substance and Sexual Activity    Alcohol use: No     Frequency: Never    Drug use: No    Sexual activity: Yes     Partners: Female     Birth control/protection: None       Review of Systems     Constitutional: Denies any weigh or appetite changes.   Eyes: no visual changes   ENT: no nasal congestion. Denies sore throat with odynophagia   Respiratory: No cough, SOB, exertional dyspnea or  hemoptysis   Cardiovascular: no chest pain or palpitations   Gastrointestinal: no nausea, vomiting or abdominal pain. Normal bowl habits   Hematologic/Lymphatic: denies hematuria  Musculoskeletal: back pain  Neurological: no seizures or tremors, gait or balance prblems  Skin: No rashes or lesions  Psych: Denies any anxiety, depression or insomnia       Objective:     Vital Signs (Most Recent):  Temp: 98.3 °F (36.8 °C) (01/10/19 1948)  Pulse: 73 (01/10/19 1948)  Resp: 18 (01/10/19 1948)  BP: 137/66 (01/10/19 1948)  SpO2: 95 % (01/10/19 1948) Vital Signs (24h Range):  Temp:  [98.1 °F (36.7 °C)-98.3 °F (36.8 °C)] 98.3 °F (36.8 °C)  Pulse:  [63-85] 73  Resp:  [13-20] 18  SpO2:  [95 %-98 %] 95 %  BP: (137-172)/(66-75) 137/66        Body mass index is 26.43 kg/m².  Body surface area is 1.99 meters squared.      Intake/Output Summary (Last 24 hours) at 1/10/2019 2126  Last data filed at 1/10/2019 1800  Gross per 24 hour   Intake 128 ml   Output 1155 ml   Net -1027 ml       Physical Exam    General: NAD, sitting up in bed and having supper   Head: normocephalic, atraumatic   Eyes: conjunctivae pink  Throat: No erythema or post nasal discharge   Neck: supple, no LAD   Lungs: CTAB   Heart: S1, S2, RRR   Abdomen: + BS x 4 quadrants, slightly distended  : Escalante with  yellow urine  Extremities: leg edema R>L  Skin: abdominal wall edema   MS: No sig ROM diff of extremities  Neuro: A&O x 3  Psy: pleasant, affect is congruent to mood       Significant Labs:   CBC:   Recent Labs   Lab 01/10/19  1110   WBC 6.41   HGB 14.0   HCT 41.1      , CMP:   Recent Labs   Lab 01/09/19  1742 01/10/19  1110    136   K 4.4 4.5    102   CO2 27 22*   GLU 94 84   BUN 16 17   CREATININE 2.2* 2.3*   CALCIUM 8.9 9.7   PROT  --  7.5   ALBUMIN  --  3.6   BILITOT  --  0.6   ALKPHOS  --  83   AST  --  17   ALT  --  8*   ANIONGAP 8 12   EGFRNONAA 28* 26*   , Coagulation:   Recent Labs   Lab 01/10/19  1110   INR 1.0   , LDH: No results for input(s): LDHCSF, BFSOURCE in the last 48 hours., Reticulocytes: No results for input(s): RETIC in the last 48 hours., Tumor Markers: No results for input(s): PSA, CEA, , AFPTM, [id number],  in the last 48 hours.    Invalid input(s): ALGTM, Uric Acid No results for input(s): URICACID in the last 48 hours., Urine Studies:   Recent Labs   Lab 01/10/19  1125   COLORU Yellow   APPEARANCEUA Clear   PHUR 5.0   SPECGRAV 1.010   PROTEINUA Negative   GLUCUA Negative   KETONESU Negative   BILIRUBINUA Negative   OCCULTUA 2+*   NITRITE Negative   UROBILINOGEN Negative   LEUKOCYTESUR Trace*   RBCUA 3   WBCUA 4   BACTERIA Rare   SQUAMEPITHEL 1    and All pertinent labs from the last 24 hours have been reviewed.    Diagnostic Results:    Assessment/Plan:     Active Diagnoses:    Diagnosis Date Noted POA    MIAN (acute kidney injury) [N17.9]  - most likely due to bilateral moderate hydronephrosis and bladder outlet obstruction  - s/p Escalante insertion  - urology consulted  - labs in the am    01/10/2019 Yes    Elevated PSA [R97.20]- with significant retroperitoneal lymphadenopathy   - highly suspicious of primary prostate Ca  - need cystoscopy and bx  - f/u urology reccs   01/10/2019 Yes    Bilateral hydronephrosis [N13.30] 01/10/2019 Yes    Enlarged prostate with  urinary obstruction [N40.1, N13.8]    - on Flomax   01/04/2019 Yes      Problems Resolved During this Admission:     DVT pro: Heparin     Enrique Barker M.D  Internal Medicine & Geriatric Medicine  Hematology & Oncology  Palliative Medicine    [address], [address]  [place] [address]  [phone] (Office)  [phone] (Fax)       Endocarditis of mitral valve

## (undated) DEVICE — CATH URTRL OPEN END STR TIP 5F

## (undated) DEVICE — MAT QUICK 40X30 FLOOR FLUID LF

## (undated) DEVICE — CONTAINER SPECIMEN STRL 4OZ

## (undated) DEVICE — TRAY FOLEY 16FR INFECTION CONT

## (undated) DEVICE — COVER SNAP KAP 26IN

## (undated) DEVICE — WIRE GUIDE .035 150CM.

## (undated) DEVICE — Device

## (undated) DEVICE — GLOVE BIOGEL PI MICRO SZ 7

## (undated) DEVICE — SYR ONLY LUER LOCK 20CC

## (undated) DEVICE — SUPPORT ULNA NERVE PROTECTOR

## (undated) DEVICE — GLOVE SURG BIOGEL LATEX SZ 7.5

## (undated) DEVICE — GUIDE WIRE MOTION .035 X 150CM

## (undated) DEVICE — BLANKET UPPER BODY 78.7X29.9IN

## (undated) DEVICE — GOWN B1 X-LG X-LONG

## (undated) DEVICE — SOL IRR NACL .9% 3000ML

## (undated) DEVICE — CANISTER SUCTION 2 LTR

## (undated) DEVICE — SEE MEDLINE ITEM 152487

## (undated) DEVICE — GAUZE SPONGE 4X4 12PLY

## (undated) DEVICE — GUIDEWIRE ZIPWIRE .035 150CM L

## (undated) DEVICE — SYR 50CC LL

## (undated) DEVICE — SOL FORMALIN PREFLD 10% 60ML

## (undated) DEVICE — DRESSING TRANS 2X2 TEGADERM

## (undated) DEVICE — SEE L#95700

## (undated) DEVICE — SET EXT W/2 VLVE PORTS 40

## (undated) DEVICE — JELLY LUBRICANT STERILE 4 OZ

## (undated) DEVICE — GUN BIOPSY 18GA MONOPLY

## (undated) DEVICE — SEE MEDLINE ITEM 107746